# Patient Record
Sex: FEMALE | Race: WHITE | NOT HISPANIC OR LATINO | Employment: OTHER | URBAN - METROPOLITAN AREA
[De-identification: names, ages, dates, MRNs, and addresses within clinical notes are randomized per-mention and may not be internally consistent; named-entity substitution may affect disease eponyms.]

---

## 2023-03-17 LAB
ANION GAP IN SER/PLAS: 12 MMOL/L (ref 10–20)
BASOPHILS (10*3/UL) IN BLOOD BY AUTOMATED COUNT: 0.03 X10E9/L (ref 0–0.1)
BASOPHILS/100 LEUKOCYTES IN BLOOD BY AUTOMATED COUNT: 0.4 % (ref 0–2)
CALCIUM (MG/DL) IN SER/PLAS: 8.6 MG/DL (ref 8.6–10.3)
CARBON DIOXIDE, TOTAL (MMOL/L) IN SER/PLAS: 27 MMOL/L (ref 21–32)
CHLORIDE (MMOL/L) IN SER/PLAS: 103 MMOL/L (ref 98–107)
CREATININE (MG/DL) IN SER/PLAS: 0.64 MG/DL (ref 0.5–1.05)
EOSINOPHILS (10*3/UL) IN BLOOD BY AUTOMATED COUNT: 0.17 X10E9/L (ref 0–0.4)
EOSINOPHILS/100 LEUKOCYTES IN BLOOD BY AUTOMATED COUNT: 2.3 % (ref 0–6)
ERYTHROCYTE DISTRIBUTION WIDTH (RATIO) BY AUTOMATED COUNT: 12 % (ref 11.5–14.5)
ERYTHROCYTE MEAN CORPUSCULAR HEMOGLOBIN CONCENTRATION (G/DL) BY AUTOMATED: 32.9 G/DL (ref 32–36)
ERYTHROCYTE MEAN CORPUSCULAR VOLUME (FL) BY AUTOMATED COUNT: 100 FL (ref 80–100)
ERYTHROCYTES (10*6/UL) IN BLOOD BY AUTOMATED COUNT: 4.13 X10E12/L (ref 4–5.2)
GFR FEMALE: >90 ML/MIN/1.73M2
GLUCOSE (MG/DL) IN SER/PLAS: 109 MG/DL (ref 74–99)
HEMATOCRIT (%) IN BLOOD BY AUTOMATED COUNT: 41.4 % (ref 36–46)
HEMOGLOBIN (G/DL) IN BLOOD: 13.6 G/DL (ref 12–16)
IMMATURE GRANULOCYTES/100 LEUKOCYTES IN BLOOD BY AUTOMATED COUNT: 0.4 % (ref 0–0.9)
LEUKOCYTES (10*3/UL) IN BLOOD BY AUTOMATED COUNT: 7.5 X10E9/L (ref 4.4–11.3)
LYMPHOCYTES (10*3/UL) IN BLOOD BY AUTOMATED COUNT: 1.89 X10E9/L (ref 0.8–3)
LYMPHOCYTES/100 LEUKOCYTES IN BLOOD BY AUTOMATED COUNT: 25.4 % (ref 13–44)
MONOCYTES (10*3/UL) IN BLOOD BY AUTOMATED COUNT: 0.61 X10E9/L (ref 0.05–0.8)
MONOCYTES/100 LEUKOCYTES IN BLOOD BY AUTOMATED COUNT: 8.2 % (ref 2–10)
NEUTROPHILS (10*3/UL) IN BLOOD BY AUTOMATED COUNT: 4.72 X10E9/L (ref 1.6–5.5)
NEUTROPHILS/100 LEUKOCYTES IN BLOOD BY AUTOMATED COUNT: 63.3 % (ref 40–80)
PLATELETS (10*3/UL) IN BLOOD AUTOMATED COUNT: 308 X10E9/L (ref 150–450)
POTASSIUM (MMOL/L) IN SER/PLAS: 3.9 MMOL/L (ref 3.5–5.3)
SODIUM (MMOL/L) IN SER/PLAS: 138 MMOL/L (ref 136–145)
UREA NITROGEN (MG/DL) IN SER/PLAS: 18 MG/DL (ref 6–23)

## 2023-03-19 LAB — STAPH/MRSA SCREEN, CULTURE: NORMAL

## 2023-03-30 LAB — SARS-COV-2 RESULT: NOT DETECTED

## 2023-03-31 ENCOUNTER — HOSPITAL ENCOUNTER (OUTPATIENT)
Dept: DATA CONVERSION | Facility: HOSPITAL | Age: 78
End: 2023-04-01
Attending: STUDENT IN AN ORGANIZED HEALTH CARE EDUCATION/TRAINING PROGRAM | Admitting: STUDENT IN AN ORGANIZED HEALTH CARE EDUCATION/TRAINING PROGRAM
Payer: MEDICARE

## 2023-03-31 DIAGNOSIS — E78.5 HYPERLIPIDEMIA, UNSPECIFIED: ICD-10-CM

## 2023-03-31 DIAGNOSIS — E03.9 HYPOTHYROIDISM, UNSPECIFIED: ICD-10-CM

## 2023-03-31 DIAGNOSIS — I10 ESSENTIAL (PRIMARY) HYPERTENSION: ICD-10-CM

## 2023-03-31 DIAGNOSIS — I25.10 ATHEROSCLEROTIC HEART DISEASE OF NATIVE CORONARY ARTERY WITHOUT ANGINA PECTORIS: ICD-10-CM

## 2023-03-31 DIAGNOSIS — M25.552 PAIN IN LEFT HIP: ICD-10-CM

## 2023-03-31 DIAGNOSIS — Z79.82 LONG TERM (CURRENT) USE OF ASPIRIN: ICD-10-CM

## 2023-03-31 DIAGNOSIS — K21.9 GASTRO-ESOPHAGEAL REFLUX DISEASE WITHOUT ESOPHAGITIS: ICD-10-CM

## 2023-03-31 DIAGNOSIS — M16.12 UNILATERAL PRIMARY OSTEOARTHRITIS, LEFT HIP: ICD-10-CM

## 2023-03-31 DIAGNOSIS — Z95.5 PRESENCE OF CORONARY ANGIOPLASTY IMPLANT AND GRAFT: ICD-10-CM

## 2023-04-01 LAB
ANION GAP IN SER/PLAS: 15 MMOL/L (ref 10–20)
BASOPHILS (10*3/UL) IN BLOOD BY AUTOMATED COUNT: 0.02 X10E9/L (ref 0–0.1)
BASOPHILS/100 LEUKOCYTES IN BLOOD BY AUTOMATED COUNT: 0.2 % (ref 0–2)
CALCIUM (MG/DL) IN SER/PLAS: 8.3 MG/DL (ref 8.6–10.3)
CARBON DIOXIDE, TOTAL (MMOL/L) IN SER/PLAS: 25 MMOL/L (ref 21–32)
CHLORIDE (MMOL/L) IN SER/PLAS: 104 MMOL/L (ref 98–107)
CREATININE (MG/DL) IN SER/PLAS: 0.83 MG/DL (ref 0.5–1.05)
ERYTHROCYTE DISTRIBUTION WIDTH (RATIO) BY AUTOMATED COUNT: 11.9 % (ref 11.5–14.5)
ERYTHROCYTE MEAN CORPUSCULAR HEMOGLOBIN CONCENTRATION (G/DL) BY AUTOMATED: 33.4 G/DL (ref 32–36)
ERYTHROCYTE MEAN CORPUSCULAR VOLUME (FL) BY AUTOMATED COUNT: 98 FL (ref 80–100)
ERYTHROCYTES (10*6/UL) IN BLOOD BY AUTOMATED COUNT: 3.45 X10E12/L (ref 4–5.2)
GFR FEMALE: 72 ML/MIN/1.73M2
GLUCOSE (MG/DL) IN SER/PLAS: 159 MG/DL (ref 74–99)
HEMATOCRIT (%) IN BLOOD BY AUTOMATED COUNT: 33.8 % (ref 36–46)
HEMOGLOBIN (G/DL) IN BLOOD: 11.3 G/DL (ref 12–16)
IMMATURE GRANULOCYTES/100 LEUKOCYTES IN BLOOD BY AUTOMATED COUNT: 2.2 % (ref 0–0.9)
LEUKOCYTES (10*3/UL) IN BLOOD BY AUTOMATED COUNT: 13 X10E9/L (ref 4.4–11.3)
LYMPHOCYTES (10*3/UL) IN BLOOD BY AUTOMATED COUNT: 1.08 X10E9/L (ref 0.8–3)
LYMPHOCYTES/100 LEUKOCYTES IN BLOOD BY AUTOMATED COUNT: 8.3 % (ref 13–44)
MONOCYTES (10*3/UL) IN BLOOD BY AUTOMATED COUNT: 0.81 X10E9/L (ref 0.05–0.8)
MONOCYTES/100 LEUKOCYTES IN BLOOD BY AUTOMATED COUNT: 6.3 % (ref 2–10)
NEUTROPHILS (10*3/UL) IN BLOOD BY AUTOMATED COUNT: 10.75 X10E9/L (ref 1.6–5.5)
NEUTROPHILS/100 LEUKOCYTES IN BLOOD BY AUTOMATED COUNT: 83 % (ref 40–80)
PLATELETS (10*3/UL) IN BLOOD AUTOMATED COUNT: 287 X10E9/L (ref 150–450)
POTASSIUM (MMOL/L) IN SER/PLAS: 4.7 MMOL/L (ref 3.5–5.3)
SODIUM (MMOL/L) IN SER/PLAS: 139 MMOL/L (ref 136–145)
UREA NITROGEN (MG/DL) IN SER/PLAS: 23 MG/DL (ref 6–23)

## 2023-04-02 LAB
ANION GAP IN SER/PLAS: NORMAL
BASOPHILS (10*3/UL) IN BLOOD BY AUTOMATED COUNT: NORMAL
BASOPHILS/100 LEUKOCYTES IN BLOOD BY AUTOMATED COUNT: NORMAL
CALCIUM (MG/DL) IN SER/PLAS: NORMAL
CARBON DIOXIDE, TOTAL (MMOL/L) IN SER/PLAS: NORMAL
CHLORIDE (MMOL/L) IN SER/PLAS: NORMAL
CREATININE (MG/DL) IN SER/PLAS: NORMAL
EOSINOPHILS (10*3/UL) IN BLOOD BY AUTOMATED COUNT: NORMAL
EOSINOPHILS/100 LEUKOCYTES IN BLOOD BY AUTOMATED COUNT: NORMAL
ERYTHROCYTE DISTRIBUTION WIDTH (RATIO) BY AUTOMATED COUNT: NORMAL
ERYTHROCYTE MEAN CORPUSCULAR HEMOGLOBIN CONCENTRATION (G/DL) BY AUTOMATED: NORMAL
ERYTHROCYTE MEAN CORPUSCULAR VOLUME (FL) BY AUTOMATED COUNT: NORMAL
ERYTHROCYTES (10*6/UL) IN BLOOD BY AUTOMATED COUNT: NORMAL
GFR FEMALE: NORMAL
GFR MALE: NORMAL
GLUCOSE (MG/DL) IN SER/PLAS: NORMAL
HEMATOCRIT (%) IN BLOOD BY AUTOMATED COUNT: NORMAL
HEMOGLOBIN (G/DL) IN BLOOD: NORMAL
IMMATURE GRANULOCYTES/100 LEUKOCYTES IN BLOOD BY AUTOMATED COUNT: NORMAL
LEUKOCYTES (10*3/UL) IN BLOOD BY AUTOMATED COUNT: NORMAL
LYMPHOCYTES (10*3/UL) IN BLOOD BY AUTOMATED COUNT: NORMAL
LYMPHOCYTES/100 LEUKOCYTES IN BLOOD BY AUTOMATED COUNT: NORMAL
MANUAL DIFFERENTIAL Y/N: NORMAL
MONOCYTES (10*3/UL) IN BLOOD BY AUTOMATED COUNT: NORMAL
MONOCYTES/100 LEUKOCYTES IN BLOOD BY AUTOMATED COUNT: NORMAL
NEUTROPHILS (10*3/UL) IN BLOOD BY AUTOMATED COUNT: NORMAL
NEUTROPHILS/100 LEUKOCYTES IN BLOOD BY AUTOMATED COUNT: NORMAL
NRBC (PER 100 WBCS) BY AUTOMATED COUNT: NORMAL
PLATELETS (10*3/UL) IN BLOOD AUTOMATED COUNT: NORMAL
POTASSIUM (MMOL/L) IN SER/PLAS: NORMAL
SODIUM (MMOL/L) IN SER/PLAS: NORMAL
UREA NITROGEN (MG/DL) IN SER/PLAS: NORMAL

## 2023-05-11 ENCOUNTER — OFFICE VISIT (OUTPATIENT)
Dept: PRIMARY CARE | Facility: CLINIC | Age: 78
End: 2023-05-11
Payer: MEDICARE

## 2023-05-11 VITALS
BODY MASS INDEX: 36.79 KG/M2 | DIASTOLIC BLOOD PRESSURE: 70 MMHG | SYSTOLIC BLOOD PRESSURE: 120 MMHG | HEIGHT: 63 IN | HEART RATE: 58 BPM | OXYGEN SATURATION: 98 % | WEIGHT: 207.6 LBS

## 2023-05-11 DIAGNOSIS — E03.9 HYPOTHYROIDISM, UNSPECIFIED TYPE: ICD-10-CM

## 2023-05-11 DIAGNOSIS — Z00.00 ROUTINE GENERAL MEDICAL EXAMINATION AT HEALTH CARE FACILITY: ICD-10-CM

## 2023-05-11 DIAGNOSIS — I10 ESSENTIAL HYPERTENSION: ICD-10-CM

## 2023-05-11 DIAGNOSIS — F32.A ANXIETY AND DEPRESSION: ICD-10-CM

## 2023-05-11 DIAGNOSIS — Z00.00 MEDICARE ANNUAL WELLNESS VISIT, SUBSEQUENT: Primary | ICD-10-CM

## 2023-05-11 DIAGNOSIS — Z13.89 ENCOUNTER FOR SCREENING FOR OTHER DISORDER: ICD-10-CM

## 2023-05-11 DIAGNOSIS — R32 URINARY INCONTINENCE, UNSPECIFIED TYPE: ICD-10-CM

## 2023-05-11 DIAGNOSIS — E66.01 CLASS 2 SEVERE OBESITY DUE TO EXCESS CALORIES WITH SERIOUS COMORBIDITY AND BODY MASS INDEX (BMI) OF 36.0 TO 36.9 IN ADULT (MULTI): ICD-10-CM

## 2023-05-11 DIAGNOSIS — F41.9 ANXIETY AND DEPRESSION: ICD-10-CM

## 2023-05-11 PROBLEM — M94.9 DISORDER OF BONE AND CARTILAGE: Status: ACTIVE | Noted: 2023-05-11

## 2023-05-11 PROBLEM — G47.00 INSOMNIA: Status: ACTIVE | Noted: 2023-05-11

## 2023-05-11 PROBLEM — J30.9 ALLERGIC RHINITIS: Status: ACTIVE | Noted: 2023-05-11

## 2023-05-11 PROBLEM — J98.01 BRONCHOSPASM, ACUTE: Status: ACTIVE | Noted: 2023-05-11

## 2023-05-11 PROBLEM — M54.50 LOW BACK PAIN: Status: ACTIVE | Noted: 2023-05-11

## 2023-05-11 PROBLEM — G56.00 CARPAL TUNNEL SYNDROME: Status: ACTIVE | Noted: 2023-05-11

## 2023-05-11 PROBLEM — H90.3 BILATERAL SENSORINEURAL HEARING LOSS: Status: ACTIVE | Noted: 2023-05-11

## 2023-05-11 PROBLEM — R29.6 FREQUENT FALLS: Status: ACTIVE | Noted: 2023-05-11

## 2023-05-11 PROBLEM — R06.83 SNORING: Status: ACTIVE | Noted: 2023-05-11

## 2023-05-11 PROBLEM — R68.89 FORGETFULNESS: Status: ACTIVE | Noted: 2023-05-11

## 2023-05-11 PROBLEM — G47.9 SLEEP DISORDER: Status: ACTIVE | Noted: 2023-05-11

## 2023-05-11 PROBLEM — R79.9 ABNORMAL BLOOD CHEMISTRY: Status: ACTIVE | Noted: 2023-05-11

## 2023-05-11 PROBLEM — M17.0 PRIMARY LOCALIZED OSTEOARTHRITIS OF BOTH KNEES: Status: ACTIVE | Noted: 2023-05-11

## 2023-05-11 PROBLEM — M25.569 JOINT PAIN, KNEE: Status: ACTIVE | Noted: 2023-05-11

## 2023-05-11 PROBLEM — M16.12 PRIMARY LOCALIZED OSTEOARTHROSIS OF LEFT HIP: Status: ACTIVE | Noted: 2023-05-11

## 2023-05-11 PROBLEM — N95.1 POSTMENOPAUSAL DISORDER: Status: ACTIVE | Noted: 2023-05-11

## 2023-05-11 PROBLEM — R53.83 FATIGUE: Status: ACTIVE | Noted: 2023-05-11

## 2023-05-11 PROBLEM — I25.10 CAD (CORONARY ARTERY DISEASE): Status: ACTIVE | Noted: 2023-05-11

## 2023-05-11 PROBLEM — M48.10 DISH (DISSEMINATED IDIOPATHIC SKELETAL HYPEROSTOSIS): Status: ACTIVE | Noted: 2023-05-11

## 2023-05-11 PROBLEM — M47.816 DJD (DEGENERATIVE JOINT DISEASE), LUMBAR: Status: ACTIVE | Noted: 2023-05-11

## 2023-05-11 PROBLEM — R10.32 LEFT GROIN PAIN: Status: ACTIVE | Noted: 2023-05-11

## 2023-05-11 PROBLEM — R31.29 MICROSCOPIC HEMATURIA: Status: ACTIVE | Noted: 2023-05-11

## 2023-05-11 PROBLEM — M89.9 DISORDER OF BONE AND CARTILAGE: Status: ACTIVE | Noted: 2023-05-11

## 2023-05-11 PROBLEM — M54.2 CERVICALGIA: Status: ACTIVE | Noted: 2023-05-11

## 2023-05-11 PROBLEM — M71.9 DISORDER OF BURSAE AND TENDONS IN SHOULDER REGION: Status: ACTIVE | Noted: 2023-05-11

## 2023-05-11 PROBLEM — M25.551 RIGHT HIP PAIN: Status: ACTIVE | Noted: 2023-05-11

## 2023-05-11 PROBLEM — M15.9 OSTEOARTHRITIS OF MULTIPLE JOINTS: Status: ACTIVE | Noted: 2023-05-11

## 2023-05-11 PROBLEM — K64.9 HEMORRHOIDS: Status: ACTIVE | Noted: 2023-05-11

## 2023-05-11 PROBLEM — R35.0 URINARY FREQUENCY: Status: ACTIVE | Noted: 2023-05-11

## 2023-05-11 PROBLEM — K21.9 ACID REFLUX: Status: ACTIVE | Noted: 2023-05-11

## 2023-05-11 PROBLEM — M67.919 DISORDER OF BURSAE AND TENDONS IN SHOULDER REGION: Status: ACTIVE | Noted: 2023-05-11

## 2023-05-11 PROCEDURE — 99397 PER PM REEVAL EST PAT 65+ YR: CPT | Performed by: PHYSICIAN ASSISTANT

## 2023-05-11 PROCEDURE — 3078F DIAST BP <80 MM HG: CPT | Performed by: PHYSICIAN ASSISTANT

## 2023-05-11 PROCEDURE — G0444 DEPRESSION SCREEN ANNUAL: HCPCS | Performed by: PHYSICIAN ASSISTANT

## 2023-05-11 PROCEDURE — 1159F MED LIST DOCD IN RCRD: CPT | Performed by: PHYSICIAN ASSISTANT

## 2023-05-11 PROCEDURE — 1160F RVW MEDS BY RX/DR IN RCRD: CPT | Performed by: PHYSICIAN ASSISTANT

## 2023-05-11 PROCEDURE — G0439 PPPS, SUBSEQ VISIT: HCPCS | Performed by: PHYSICIAN ASSISTANT

## 2023-05-11 PROCEDURE — 1123F ACP DISCUSS/DSCN MKR DOCD: CPT | Performed by: PHYSICIAN ASSISTANT

## 2023-05-11 PROCEDURE — 1036F TOBACCO NON-USER: CPT | Performed by: PHYSICIAN ASSISTANT

## 2023-05-11 PROCEDURE — 1033F TOBACCO NONSMOKER NOR 2NDHND: CPT | Performed by: PHYSICIAN ASSISTANT

## 2023-05-11 PROCEDURE — 3074F SYST BP LT 130 MM HG: CPT | Performed by: PHYSICIAN ASSISTANT

## 2023-05-11 PROCEDURE — 1170F FXNL STATUS ASSESSED: CPT | Performed by: PHYSICIAN ASSISTANT

## 2023-05-11 RX ORDER — LOSARTAN POTASSIUM AND HYDROCHLOROTHIAZIDE 25; 100 MG/1; MG/1
1 TABLET ORAL EVERY MORNING
COMMUNITY
Start: 2023-03-28 | End: 2024-04-16 | Stop reason: ENTERED-IN-ERROR

## 2023-05-11 RX ORDER — PAROXETINE HYDROCHLORIDE 20 MG/1
1 TABLET, FILM COATED ORAL DAILY
COMMUNITY
Start: 2013-04-22 | End: 2023-06-06

## 2023-05-11 RX ORDER — CHOLECALCIFEROL (VITAMIN D3) 25 MCG
1 TABLET ORAL EVERY MORNING
COMMUNITY
End: 2024-05-29 | Stop reason: WASHOUT

## 2023-05-11 RX ORDER — LEVOTHYROXINE SODIUM 88 UG/1
1 TABLET ORAL DAILY
COMMUNITY
Start: 2013-04-22 | End: 2023-06-06

## 2023-05-11 RX ORDER — METOPROLOL SUCCINATE 100 MG/1
1 TABLET, EXTENDED RELEASE ORAL EVERY MORNING
COMMUNITY
Start: 2016-11-30 | End: 2024-04-10 | Stop reason: HOSPADM

## 2023-05-11 RX ORDER — ATORVASTATIN CALCIUM 80 MG/1
1 TABLET, FILM COATED ORAL NIGHTLY
Status: ON HOLD | COMMUNITY
Start: 2016-01-07 | End: 2024-04-08

## 2023-05-11 ASSESSMENT — ACTIVITIES OF DAILY LIVING (ADL)
MANAGING_FINANCES: INDEPENDENT
DOING_HOUSEWORK: INDEPENDENT
TAKING_MEDICATION: INDEPENDENT
DRESSING: INDEPENDENT
GROCERY_SHOPPING: INDEPENDENT
BATHING: INDEPENDENT

## 2023-05-11 ASSESSMENT — ENCOUNTER SYMPTOMS
OCCASIONAL FEELINGS OF UNSTEADINESS: 1
LOSS OF SENSATION IN FEET: 0
DEPRESSION: 0

## 2023-05-11 ASSESSMENT — PATIENT HEALTH QUESTIONNAIRE - PHQ9
SUM OF ALL RESPONSES TO PHQ9 QUESTIONS 1 AND 2: 0
2. FEELING DOWN, DEPRESSED OR HOPELESS: NOT AT ALL
1. LITTLE INTEREST OR PLEASURE IN DOING THINGS: NOT AT ALL

## 2023-05-11 NOTE — PROGRESS NOTES
Subjective   Reason for Visit: Marybeth Duggan is an 77 y.o. female here for a Medicare Wellness visit.     Past Medical, Surgical, and Family History reviewed and updated in chart.         HPI 77-year-old female presenting with her daughter for this visit.  Generally doing well. Currently c/o:     Urinary incontinence: chronic, worsening. Wearing depends usually cannot make it to the restroom in time. Previously followed with Urology, Dr. Rodriguez. Last seen 6/14/2022. Per Dr. Rodriguez's note, she was prescribed Vesicare and Gemtesa, but patient and daughter do not recall having ever tried the medications. She would like to meet with Urology again.  Referral will be placed    HTN, HLD, CAD: Compliant with losartan-HCTZ 100-25 mg, metoprolol succinate 100 mg. Stable on 81 mg ASA and atorvastatin 80 mg. She does check BP at home, is controlled. She does endorse some dizziness when walking. Denies any headache, chest pain, SOB/WARNER, palpitations, LE edema.  Follows with cardiology, last seen 3/28/2023.    Anxiety and depression: Feeling well on paroxetine 20 mg daily.     S/p left DOMINGUEZ (with Dr. Guzman): Tolerated the procedure well and has completed home PT.  Unfortunately she continues to limp and experience pain in the anterior thigh/groin. She is not taking any narcotic pain medicine, but does use Tylenol for pain.  She ambulates with a cane.    Health maintenance:  Immunizations:  -Flu: deferred to fall 2023  -Pneumococcal: UTD  -Shingrix: recommended, obtain from local pharmacy  -Tdap: Recommended, obtain from pharmacy  Mammogram UTD (last 2/22/22) - declined further testing  Colonoscopy UTD (last 2/11/22) - no future screening d/t age  DEXA UTD (last 5/11/18) - normal bone density, declined further screening  Has healthcare POA/living well    Last MCR / CPE: 5/11/2023 (MCR ADV)    Patient Care Team:  Yoana Willoughby PA-C as PCP - General  Yoana Willoughby PA-C as PCP - [employer] PCP     Objective  "  Vitals:  /70   Pulse 58   Ht 1.6 m (5' 3\")   Wt 94.2 kg (207 lb 9.6 oz)   SpO2 98%   BMI 36.77 kg/m²       Physical Exam  Vitals reviewed.   Constitutional:       General: She is not in acute distress.     Appearance: Normal appearance.   HENT:      Head: Normocephalic and atraumatic.      Right Ear: Tympanic membrane, ear canal and external ear normal. There is no impacted cerumen.      Left Ear: Tympanic membrane, ear canal and external ear normal. There is no impacted cerumen.      Nose: Nose normal. No congestion or rhinorrhea.      Mouth/Throat:      Mouth: Mucous membranes are moist.      Pharynx: Oropharynx is clear. No oropharyngeal exudate or posterior oropharyngeal erythema.   Eyes:      General: No scleral icterus.        Right eye: No discharge.         Left eye: No discharge.      Extraocular Movements: Extraocular movements intact.      Conjunctiva/sclera: Conjunctivae normal.      Pupils: Pupils are equal, round, and reactive to light.   Cardiovascular:      Rate and Rhythm: Normal rate and regular rhythm.      Heart sounds: Normal heart sounds. No murmur heard.     No friction rub. No gallop.   Pulmonary:      Effort: Pulmonary effort is normal. No respiratory distress.      Breath sounds: Normal breath sounds. No stridor. No wheezing, rhonchi or rales.   Abdominal:      General: Bowel sounds are normal. There is no distension.      Palpations: Abdomen is soft. There is no mass.      Tenderness: There is no abdominal tenderness. There is no right CVA tenderness or left CVA tenderness.   Musculoskeletal:         General: Normal range of motion.      Cervical back: Normal range of motion and neck supple.      Right lower leg: No edema.      Left lower leg: No edema.      Comments: Ambulates with a cane   Skin:     General: Skin is warm and dry.      Findings: No rash.   Neurological:      General: No focal deficit present.      Mental Status: She is alert and oriented to person, place, and " time. Mental status is at baseline.      Cranial Nerves: No cranial nerve deficit.      Gait: Gait normal.   Psychiatric:         Mood and Affect: Mood normal.         Behavior: Behavior normal.         Assessment/Plan   Problem List Items Addressed This Visit       Anxiety and depression    Current Assessment & Plan     Stable.  Continue paroxetine 20 mg daily         Essential hypertension    Current Assessment & Plan     Well-controlled.  Continue losartan-HCTZ 100-25 mg and metoprolol succinate 100 mg.  Continue monitoring BP at home regularly.  Follow with cardiology.         Hypothyroidism    Current Assessment & Plan     Continue levothyroxine 88 mcg daily.         Urinary incontinence    Current Assessment & Plan     Referral to urology placed.  Continue wearing depends.  Encourage proper hygiene and washing often to prevent sores         Relevant Orders    Referral to Urology    Class 2 severe obesity due to excess calories with serious comorbidity and body mass index (BMI) of 36.0 to 36.9 in adult (CMS/[facility])    Current Assessment & Plan     Follow-up low carbohydrate and saturated fat diet.  Exercise for 30 minutes daily as tolerated         Medicare annual wellness visit, subsequent - Primary     Other Visit Diagnoses       Routine general medical examination at health care facility        Encounter for screening for other disorder                 Follow-up in 6 months or sooner as needed

## 2023-05-12 PROBLEM — Z00.00 MEDICARE ANNUAL WELLNESS VISIT, SUBSEQUENT: Status: ACTIVE | Noted: 2023-05-12

## 2023-05-12 PROBLEM — E66.812 CLASS 2 SEVERE OBESITY DUE TO EXCESS CALORIES WITH SERIOUS COMORBIDITY AND BODY MASS INDEX (BMI) OF 36.0 TO 36.9 IN ADULT: Status: ACTIVE | Noted: 2023-05-12

## 2023-05-12 PROBLEM — E66.01 CLASS 2 SEVERE OBESITY DUE TO EXCESS CALORIES WITH SERIOUS COMORBIDITY AND BODY MASS INDEX (BMI) OF 36.0 TO 36.9 IN ADULT (MULTI): Status: ACTIVE | Noted: 2023-05-12

## 2023-05-12 PROBLEM — R32 URINARY INCONTINENCE: Status: ACTIVE | Noted: 2023-05-12

## 2023-05-12 NOTE — ASSESSMENT & PLAN NOTE
Referral to urology placed.  Continue wearing depends.  Encourage proper hygiene and washing often to prevent sores

## 2023-05-12 NOTE — ASSESSMENT & PLAN NOTE
Well-controlled.  Continue losartan-HCTZ 100-25 mg and metoprolol succinate 100 mg.  Continue monitoring BP at home regularly.  Follow with cardiology.

## 2023-05-12 NOTE — ASSESSMENT & PLAN NOTE
Continue 80 mg aspirin and atorvastatin 80 mg.  Follow Mediterranean-style diet and exercise as tolerated.  Follow with cardiology

## 2023-06-06 DIAGNOSIS — F32.A ANXIETY AND DEPRESSION: Primary | ICD-10-CM

## 2023-06-06 DIAGNOSIS — F41.9 ANXIETY AND DEPRESSION: Primary | ICD-10-CM

## 2023-06-06 DIAGNOSIS — E03.9 HYPOTHYROIDISM, UNSPECIFIED TYPE: ICD-10-CM

## 2023-06-06 RX ORDER — LEVOTHYROXINE SODIUM 88 UG/1
TABLET ORAL
Qty: 90 TABLET | Refills: 1 | Status: SHIPPED | OUTPATIENT
Start: 2023-06-06 | End: 2023-09-06

## 2023-06-06 RX ORDER — PAROXETINE HYDROCHLORIDE 20 MG/1
TABLET, FILM COATED ORAL
Qty: 90 TABLET | Refills: 1 | Status: SHIPPED | OUTPATIENT
Start: 2023-06-06 | End: 2023-11-09 | Stop reason: SDUPTHER

## 2023-09-05 DIAGNOSIS — E03.9 HYPOTHYROIDISM, UNSPECIFIED TYPE: ICD-10-CM

## 2023-09-06 RX ORDER — LEVOTHYROXINE SODIUM 88 UG/1
TABLET ORAL
Qty: 100 TABLET | Refills: 0 | Status: SHIPPED | OUTPATIENT
Start: 2023-09-06 | End: 2023-11-09 | Stop reason: SDUPTHER

## 2023-09-07 VITALS
WEIGHT: 207.89 LBS | HEART RATE: 80 BPM | BODY MASS INDEX: 36.84 KG/M2 | SYSTOLIC BLOOD PRESSURE: 110 MMHG | OXYGEN SATURATION: 97 % | DIASTOLIC BLOOD PRESSURE: 63 MMHG | HEIGHT: 63 IN

## 2023-09-14 NOTE — PROGRESS NOTES
Service: Orthopaedics     Subjective Data:   JAYA DAWN is a 77 year old Female who is Hospital Day # 2 and POD #1 for Left total hip arthroplasty.     Reports pain adequately controlled  Denies any chest pain, SOB, nausea, vomiting.   Tolerating Diet.   Mild surgical swelling, no active bleeding or s/s of infection.    Denies calf tenderness b/l.    Overnight Events: Patient had an uneventful night.     Objective Data:     Objective Information:      T   P  R  BP   MAP  SpO2   Value  36.3  69  18  109/27      92%  Date/Time 4/1 8:00 4/1 8:00 4/1 8:00 4/1 8:00    4/1 8:00  Range  (36.3C - 36.6C )  (60 - 72 )  (16 - 18 )  (109 - 150 )/ (27 - 81 )    (92% - 96% )      Pain reported at 4/1 12:44: 0 = None    Physical Exam by System:    Constitutional: Well developed, awake/alert/oriented  x3, no distress, alert and cooperative    Sitting up in bed, chatty, NAD   Eyes: PERRLA   ENMT: mucous membranes moist, no apparent injury,  no lesions seen   Head/Neck: Neck supple, no apparent injury, trachea  midline   Respiratory/Thorax: Patent airways, CTAB, normal  breath sounds with good chest expansion, thorax symmetric   Cardiovascular: Regular, rate and rhythm, 2+ equal  pulses of the extremities   Gastrointestinal: Nondistended, soft, non-tender,  +BS   Genitourinary: voiding without difficulty   Musculoskeletal: ROM intact, no joint swelling, normal  strength    generalized weakness 2/2 joint replacement   Extremities: no cyanosis edema, contusions or wounds,  no clubbing    LLE, neurovascular intact, <2 cap refill, df/pf, dp/pt, 2+ pulses, no drainage noted. Hip mepilex c/d/i, ice pack applied   Neurological: alert and oriented x3, intact senses   Psychological: Appropriate mood and behavior   Skin: Warm and dry, no lesions, no rashes     Medication:    Medications:          Continuous Medications       --------------------------------    1. Lactated Ringers Infusion:  1000  mL  IntraVenous  <Continuous>          Scheduled Medications       --------------------------------    1. Acetaminophen:  975  mg  Oral  Every 8 Hours    2. Apixaban:  2.5  mg  Oral  Every 12 Hours    3. Atorvastatin:  80  mg  Oral  Daily    4. Docusate:  100  mg  Oral  2 Times a Day    5. Ketorolac Injectable:  15  mg  IntraVenous Push  Every 6 Hours    6. Levothyroxine:  88  microgram(s)  Oral  Daily    7. Metoprolol Succinate Extended Release:  100  mg  Oral  Daily    8. Pantoprazole:  40  mg  Oral  <User Schedule>    9. PARoxetine:  20  mg  Oral  Daily    10. Sennosides:  1  tablet(s)  Oral  Daily         PRN Medications       --------------------------------    1. diphenhydrAMINE:  25  mg  Oral  Every 6 Hours    2. HYDROmorphone Injectable:  0.4  mg  IntraVenous Push  Every 4 Hours    3. Ondansetron Injectable:  4  mg  IntraVenous Push  Every 6 Hours    4. oxyCODONE Immediate Release:  5  mg  Oral  Every 4 Hours    5. oxyCODONE Immediate Release:  10  mg  Oral  Every 4 Hours    6. Polyethylene Glycol:  17  gram(s)  Oral  2 Times a Day    7. Sore Throat Lozenge:  1  lozenge(s)  Oral  Every 4 Hours    8. traMADol:  50  mg  Oral  Every 6 Hours        Recent Lab Results:    Results:        I have reviewed these laboratory results:    Complete Blood Count + Differential  01-Apr-2023 05:19:00      Result Value    White Blood Cell Count  13.0   H   Red Blood Cell Count  3.45   L   HGB  11.3   L   HCT  33.8   L   MCV  98    MCHC  33.4    PLT  287    RDW-CV  11.9    Neutrophil %  83.0    Immature Granulocytes %  2.2   H   Lymphocyte %  8.3    Monocyte %  6.3    Basophil %  0.2    Neutrophil Count  10.75   H   Lymphocyte Count  1.08    Monocyte Count  0.81   H   Basophil Count  0.02      Basic Metabolic Panel  01-Apr-2023 05:19:00      Result Value    Glucose, Serum  159   H   NA  139    K  4.7    CL  104    Bicarbonate, Serum  25    Anion Gap, Serum  15    BUN  23    CREAT  0.83    GFR Female  72    Calcium, Serum  8.3   L       Radiology  Results:    Results:        Impression:    Satisfactory appearance status post left total hip arthroplasty.     Xray Pelvis 1 or 2 View [Apr 1 2023  8:44AM]      Impression:    Satisfactory appearance status post left total hip arthroplasty.     Xray Pelvis 1 or 2 View [Apr 1 2023  8:44AM]      Assessment and Plan:   Code Status:  ·  Code Status Full Code     Assessment:    JAYA DAWN is a 77 year old Female who is Hospital Day # 2 and POD #1 for Left total hip arthroplasty    Doing well overall, pain is controlled.     Ortho/ Musculoskeletal: Osteoarthritis s/p right total hip replacement. Denies numbness, able to wiggle toes, neurovascular intact   -WBAT with FWW   -OOBT/ PT/ Mobilize   -Ice to affected area   -Keep mepilex in place x1 week postoperative   - continue paroxetine     Neuro: Acute postop pain as expected - well controlled on current medication regimen   Hx: Anxiety, depression, insomnia   -Continue current pain regimen       CV: RRR  Hx: CAD, HTN, HLD  -Continue to monitor vital signs   - continue metoprolol and atorvastatin     Resp: On RA, NAD, lungs CTAB   -IS Q 1 hour while awake       GI: denies, N/V, abdomen soft   Hx: GERD  -Tolerating regular diet   -PRN Antiemetic   -Stool softener/ laxative   - continue PPI    :   voiding without difficulty     Heme: DVT Proph   -SCDs/ TEDs   - Eliquis 2.5mg BID for blood clot prevention     Endo:   Hx: hypothyroidism   - continue levothyroxine     ID: Afebrile   -Monitor for s/s of infection   - cefadroxil     Dispo: Doing well, POD #1, cleared by PT, ok to be discharged home with [facility]     Patient has progressed extremely well and exceeded expectations regarding their recovery and is medically stable, sufficiently mobile and able to be discharged home safely at this time with home care services.     I spent 30 minutes with this patient and/or family.  Greater than 50% of this time was spent in counseling and/or coordination of  care.          Electronic Signatures:  Yesi Ruelas ([facility]-CNP)  (Signed 01-Apr-2023 13:11)   Authored: Service, Subjective Data, Objective Data, Assessment  and Plan, Note Completion      Last Updated: 01-Apr-2023 13:11 by Yesi Ruelas ([facility]-CNP)

## 2023-09-14 NOTE — DISCHARGE SUMMARY
Send Summary:   Discharge Summary Providers:  Provider Role Provider Name   · Attending Malik Guzman   · Referring Malik Guzman   · Primary Head, Yoana BAEZ   · Primary Rose Sandoval       Note Recipients: Yoana Willoughby, PAC - [phone]  []       Discharge:    Summary:   Admission Date: .31-Mar-2023 09:58:00   Discharge Date: 01-Apr-2023   Attending Physician at Discharge: Malik Guzman   Admission Reason: Left hip OA   Final Discharge Diagnoses: Status post total replacement  of left hip   Procedures: Date: 31-Mar-2023 16:12:00  Procedure Name: Left total hip arthroplasty   Condition at Discharge: Fair   Disposition at Discharge: Home Health Care - New   Physical Exam:    see daily note   Hospital Course:    77 year-old F who presented with L hip OA. Patient is now s/p L DOMINGUEZ on 3/31/23 by Dr. Guzman. On the day of surgery, patient was identified in the pre-operative  holding area and agreeable to proceed with surgery. Written consent was obtained.  Please see operative note for further details of this procedure. Patient received 24 hours of aldair-operative antibiotics. Patient recovered in the PACU before transfer  to a regular nursing floor. Patient was started on oxycodone, tylenol, toradol, and dilaudid for pain control and Eliquis 2.5 mg bid for DVT prophylaxis. Physical therapy recommended continued physical therapy and wound care. On the day of discharge,  patient was afebrile with stable vital signs. Patient was neurovascularly intact at time of discharge. Patient was discharged with prescription of Eliquis for DVT prophylaxis for 4 weeks. Patient will follow-up with Dr. Guzman in 2 weeks for postoperative  visit     Immunizations:    Immunizations:  20-Mar-2021   SARS-CoV-2 (COVID-19): Immunizations, 20-Mar-2021  20-Feb-2021   SARS-CoV-2 (COVID-19): Immunizations, 20-Feb-2021      Discharge Information:    and Continuing Care:   Lab Results - Pending:    None  Radiology Results -  Pending: None   Discharge Instructions:    Activity:           activity with assistance.          May not shower.  Keep incision clean and dry for one week          May not drive while taking narcotics.            No pushing, pulling, or lifting objects greater than 10 pounds.            Weight-bearing Instructions: full weight bearing left leg.      Nutrition/Diet:           resume normal diet    Wound Care:           Wound Type:   surgical incision          Instructions:   no lotions, creams, or tub soaks          Other Instructions:   Keep surgical dressing on for 1 week    Additional Orders:           Additional Instructions:   MEDICATION SIDE EFFECTS.  OXYCODONE: constipation, nausea, vomiting, upset stomach, (sleepiness), dizziness, lightheadedness, itching, headache, blurred vision, dry mouth, sweating  TRAMADOL: headache, dizziness, drowsiness, tired feeling; constipation, diarrhea, nausea, vomiting, stomach pain; feeling nervous or anxious, itching, sweating, flushing.    Call if any drainage after 7 days, increased redness/warmth/swelling at incision site, pain/tenderness of calf, swelling of calf that does not respond to elevation, SOB/chest pain.    Do not swim in pools or ponds until 3 months after surgery.  Apply АНДРЕЙ hose or ACE bandages to both lower legs x 6 weeks; remove at night.  Do not visit the dentist until 3 months after the surgery date.  You will need to take an antibiotic previous to any dental procedure and colonoscopy.  Please call [phone] and request a prescription for antibiotics for previous to these procedures..    Home Care Certification:           Home Care Agency:    Home Team [phone]          Skilled Disciplines Ordered:   PT    Home Care Services:           Home Care Skilled Service:   Rehab (PT/OT/SP eval and treat)    Follow Up Appointments:    Follow-Up Appointment 01:           Physician/Dept/Service:   Dr. Guzman          Call to Schedule in:   2 weeks           Phone Number:   [phone]    Discharge Medications: Home Medication   losartan-hydroCHLOROthiazide 100 mg-25 mg oral tablet - 1 tab(s) orally once a day  Eliquis 2.5 mg oral tablet - 1 tab(s) orally 2 times a day -.ADOD - .Meds to Beds  4/1 to help prevent blood clots   acetaminophen 500 mg oral capsule - 2 cap(s) orally every 8 hours -.ADOD 4/1 - .Meds to Beds  for pain   cefadroxil 500 mg oral capsule - 1 cap(s) orally 2 times a day -.ADOD - .Meds to Beds  4/1 to help prevent infection   Colace 100 mg oral capsule - 1 cap(s) orally 2 times a day -.ADOD - .Meds to Beds  4/1 as needed for constipation   omeprazole 20 mg oral delayed release tablet - 1 tab(s) orally once a day -.ADOD - .Meds to Beds  4/1   ondansetron 4 mg oral tablet - 1 tab(s) orally every 8 hours -.Meds to Beds - .ADOD 4/1  for nausea or vomiting.   oxyCODONE 5 mg oral tablet - 1 tab(s) orally every 6 hours -.ADOD - .Meds to Beds  4/1 as needed for moderate to severe pain   Senna 8.6 mg oral tablet - 1 tab(s) orally once a day -.ADOD - .Meds to Beds  4/1 to help with constipation   traMADol 50 mg oral tablet - 1 tab(s) orally every 6 hours -.ADOD - .Meds to Beds  4/1 for breakthrough pain. Do not take with Oxycodone.   metoprolol succinate 100 mg oral tablet, extended release - 1 tab(s) orally once a day  atorvastatin 80 mg oral tablet - 1 tab(s) orally once a day  levothyroxine 88 mcg (0.088 mg) oral tablet - 1 tab(s) orally once a day  PARoxetine 20 mg oral tablet - 1 tab(s) orally once a day     PRN Medication     DNR Status:   ·  Code Status Code Status order at time of discharge: Full Code       Electronic Signatures:  Yesi Ruelas (APRN-CNP)  (Signed 01-Apr-2023 13:14)   Authored: Send Summary, Summary Content, Immunizations,  Ongoing Care, DNR Status, Note Completion      Last Updated: 01-Apr-2023 13:14 by Yesi Ruelas (APRN-CNP)

## 2023-09-14 NOTE — H&P
History & Physical Reviewed:   I have reviewed the History and Physical dated:  17-Mar-2023   History and Physical reviewed and relevant findings noted. Patient examined to review pertinent physical  findings.: Significant findings noted below   Findings: Risks and benefits explained with a Chinese  speaking .  All questions were answered.  Proceed with left total hip arthroplasty   Home Medications Reviewed: no changes noted   Allergies Reviewed: no changes noted       ERAS (Enhanced Recovery After Surgery):  ·  ERAS Patient: no     Consent:   COVID-19 Consent:  ·  COVID-19 Risk Consent Surgeon has reviewed key risks related to the risk of rodrigo COVID-19 and if they contract COVID-19 what the risks are.     Attestation:   Note Completion:  I am a:  Resident/Fellow   Attending Attestation I saw and evaluated the patient.  I personally obtained the key and critical portions of the history and physical exam or was physically present for key and  critical portions performed by the resident/fellow. I reviewed the resident/fellow?s documentation and discussed the patient with the resident/fellow.  I agree with the resident/fellow?s medical decision making as documented in the note.     I personally evaluated the patient on 31-Mar-2023         Electronic Signatures:  Malik Guzman)  (Signed 31-Mar-2023 14:12)   Authored: History & Physical Reviewed, Note Completion   Co-Signer: History & Physical Reviewed, ERAS, Consent, Note Completion  Nikhil Rubin (Resident))  (Signed 31-Mar-2023 06:35)   Authored: History & Physical Reviewed, ERAS, Consent,  Note Completion      Last Updated: 31-Mar-2023 14:12 by Malik Guzman)

## 2023-11-09 ENCOUNTER — LAB (OUTPATIENT)
Dept: LAB | Facility: LAB | Age: 78
End: 2023-11-09
Payer: MEDICARE

## 2023-11-09 ENCOUNTER — OFFICE VISIT (OUTPATIENT)
Dept: PRIMARY CARE | Facility: CLINIC | Age: 78
End: 2023-11-09
Payer: MEDICARE

## 2023-11-09 VITALS
BODY MASS INDEX: 37.55 KG/M2 | SYSTOLIC BLOOD PRESSURE: 143 MMHG | OXYGEN SATURATION: 95 % | DIASTOLIC BLOOD PRESSURE: 84 MMHG | WEIGHT: 212 LBS | HEART RATE: 66 BPM

## 2023-11-09 DIAGNOSIS — F41.9 ANXIETY AND DEPRESSION: ICD-10-CM

## 2023-11-09 DIAGNOSIS — I10 ESSENTIAL HYPERTENSION: Primary | ICD-10-CM

## 2023-11-09 DIAGNOSIS — E03.9 HYPOTHYROIDISM, UNSPECIFIED TYPE: ICD-10-CM

## 2023-11-09 DIAGNOSIS — E55.9 VITAMIN D DEFICIENCY: ICD-10-CM

## 2023-11-09 DIAGNOSIS — R73.9 HYPERGLYCEMIA: ICD-10-CM

## 2023-11-09 DIAGNOSIS — E78.5 HYPERLIPIDEMIA, UNSPECIFIED HYPERLIPIDEMIA TYPE: ICD-10-CM

## 2023-11-09 DIAGNOSIS — I10 ESSENTIAL HYPERTENSION: ICD-10-CM

## 2023-11-09 DIAGNOSIS — F32.A ANXIETY AND DEPRESSION: ICD-10-CM

## 2023-11-09 DIAGNOSIS — R32 URINARY INCONTINENCE, UNSPECIFIED TYPE: ICD-10-CM

## 2023-11-09 PROBLEM — R52 PAIN: Status: ACTIVE | Noted: 2023-11-09

## 2023-11-09 PROBLEM — I82.90 VENOUS THROMBOSIS: Status: ACTIVE | Noted: 2023-11-09

## 2023-11-09 PROBLEM — R60.0 PERIPHERAL EDEMA: Status: ACTIVE | Noted: 2023-11-09

## 2023-11-09 PROBLEM — I95.9 LOW BLOOD PRESSURE: Status: ACTIVE | Noted: 2023-11-09

## 2023-11-09 PROBLEM — L90.0 LICHEN SCLEROSUS: Status: ACTIVE | Noted: 2023-11-09

## 2023-11-09 PROBLEM — R60.9 PERIPHERAL EDEMA: Status: ACTIVE | Noted: 2023-11-09

## 2023-11-09 PROBLEM — N39.41 URGE INCONTINENCE: Status: ACTIVE | Noted: 2023-11-09

## 2023-11-09 PROBLEM — E87.8 FLUID VOLUME DISORDER: Status: ACTIVE | Noted: 2023-11-09

## 2023-11-09 PROBLEM — R68.89 ACTIVITY INTOLERANCE: Status: ACTIVE | Noted: 2023-11-09

## 2023-11-09 PROBLEM — B37.31 VAGINAL YEAST INFECTION: Status: ACTIVE | Noted: 2023-11-09

## 2023-11-09 LAB
25(OH)D3 SERPL-MCNC: 52 NG/ML (ref 30–100)
ALBUMIN SERPL BCP-MCNC: 4.1 G/DL (ref 3.4–5)
ALP SERPL-CCNC: 102 U/L (ref 33–136)
ALT SERPL W P-5'-P-CCNC: 12 U/L (ref 7–45)
ANION GAP SERPL CALC-SCNC: 14 MMOL/L (ref 10–20)
AST SERPL W P-5'-P-CCNC: 17 U/L (ref 9–39)
BASOPHILS # BLD AUTO: 0.03 X10*3/UL (ref 0–0.1)
BASOPHILS NFR BLD AUTO: 0.4 %
BILIRUB SERPL-MCNC: 0.6 MG/DL (ref 0–1.2)
BUN SERPL-MCNC: 22 MG/DL (ref 6–23)
CALCIUM SERPL-MCNC: 9.7 MG/DL (ref 8.6–10.6)
CHLORIDE SERPL-SCNC: 103 MMOL/L (ref 98–107)
CHOLEST SERPL-MCNC: 165 MG/DL (ref 0–199)
CHOLESTEROL/HDL RATIO: 3.6
CO2 SERPL-SCNC: 30 MMOL/L (ref 21–32)
CREAT SERPL-MCNC: 0.73 MG/DL (ref 0.5–1.05)
EOSINOPHIL # BLD AUTO: 0.26 X10*3/UL (ref 0–0.4)
EOSINOPHIL NFR BLD AUTO: 3.2 %
ERYTHROCYTE [DISTWIDTH] IN BLOOD BY AUTOMATED COUNT: 12 % (ref 11.5–14.5)
EST. AVERAGE GLUCOSE BLD GHB EST-MCNC: 120 MG/DL
GFR SERPL CREATININE-BSD FRML MDRD: 84 ML/MIN/1.73M*2
GLUCOSE SERPL-MCNC: 102 MG/DL (ref 74–99)
HBA1C MFR BLD: 5.8 %
HCT VFR BLD AUTO: 43.1 % (ref 36–46)
HDLC SERPL-MCNC: 45.5 MG/DL
HGB BLD-MCNC: 14.1 G/DL (ref 12–16)
IMM GRANULOCYTES # BLD AUTO: 0.04 X10*3/UL (ref 0–0.5)
IMM GRANULOCYTES NFR BLD AUTO: 0.5 % (ref 0–0.9)
LDLC SERPL CALC-MCNC: 73 MG/DL
LYMPHOCYTES # BLD AUTO: 2.55 X10*3/UL (ref 0.8–3)
LYMPHOCYTES NFR BLD AUTO: 31 %
MCH RBC QN AUTO: 32.6 PG (ref 26–34)
MCHC RBC AUTO-ENTMCNC: 32.7 G/DL (ref 32–36)
MCV RBC AUTO: 100 FL (ref 80–100)
MONOCYTES # BLD AUTO: 0.65 X10*3/UL (ref 0.05–0.8)
MONOCYTES NFR BLD AUTO: 7.9 %
NEUTROPHILS # BLD AUTO: 4.7 X10*3/UL (ref 1.6–5.5)
NEUTROPHILS NFR BLD AUTO: 57 %
NON HDL CHOLESTEROL: 120 MG/DL (ref 0–149)
NRBC BLD-RTO: 0 /100 WBCS (ref 0–0)
PLATELET # BLD AUTO: 343 X10*3/UL (ref 150–450)
POTASSIUM SERPL-SCNC: 5.1 MMOL/L (ref 3.5–5.3)
PROT SERPL-MCNC: 7.9 G/DL (ref 6.4–8.2)
RBC # BLD AUTO: 4.33 X10*6/UL (ref 4–5.2)
SODIUM SERPL-SCNC: 142 MMOL/L (ref 136–145)
TRIGL SERPL-MCNC: 232 MG/DL (ref 0–149)
TSH SERPL-ACNC: 2.04 MIU/L (ref 0.44–3.98)
VLDL: 46 MG/DL (ref 0–40)
WBC # BLD AUTO: 8.2 X10*3/UL (ref 4.4–11.3)

## 2023-11-09 PROCEDURE — 80053 COMPREHEN METABOLIC PANEL: CPT

## 2023-11-09 PROCEDURE — G0008 ADMIN INFLUENZA VIRUS VAC: HCPCS | Performed by: PHYSICIAN ASSISTANT

## 2023-11-09 PROCEDURE — 83036 HEMOGLOBIN GLYCOSYLATED A1C: CPT

## 2023-11-09 PROCEDURE — 80061 LIPID PANEL: CPT

## 2023-11-09 PROCEDURE — 1159F MED LIST DOCD IN RCRD: CPT | Performed by: PHYSICIAN ASSISTANT

## 2023-11-09 PROCEDURE — 36415 COLL VENOUS BLD VENIPUNCTURE: CPT

## 2023-11-09 PROCEDURE — 84443 ASSAY THYROID STIM HORMONE: CPT

## 2023-11-09 PROCEDURE — 1036F TOBACCO NON-USER: CPT | Performed by: PHYSICIAN ASSISTANT

## 2023-11-09 PROCEDURE — 90662 IIV NO PRSV INCREASED AG IM: CPT | Performed by: PHYSICIAN ASSISTANT

## 2023-11-09 PROCEDURE — 1160F RVW MEDS BY RX/DR IN RCRD: CPT | Performed by: PHYSICIAN ASSISTANT

## 2023-11-09 PROCEDURE — 85025 COMPLETE CBC W/AUTO DIFF WBC: CPT

## 2023-11-09 PROCEDURE — 82306 VITAMIN D 25 HYDROXY: CPT

## 2023-11-09 PROCEDURE — 3077F SYST BP >= 140 MM HG: CPT | Performed by: PHYSICIAN ASSISTANT

## 2023-11-09 PROCEDURE — 1126F AMNT PAIN NOTED NONE PRSNT: CPT | Performed by: PHYSICIAN ASSISTANT

## 2023-11-09 PROCEDURE — 99213 OFFICE O/P EST LOW 20 MIN: CPT | Performed by: PHYSICIAN ASSISTANT

## 2023-11-09 PROCEDURE — 3079F DIAST BP 80-89 MM HG: CPT | Performed by: PHYSICIAN ASSISTANT

## 2023-11-09 RX ORDER — PAROXETINE HYDROCHLORIDE 20 MG/1
20 TABLET, FILM COATED ORAL DAILY
Qty: 90 TABLET | Refills: 1 | Status: SHIPPED | OUTPATIENT
Start: 2023-11-09 | End: 2024-05-14 | Stop reason: SDUPTHER

## 2023-11-09 RX ORDER — LEVOTHYROXINE SODIUM 88 UG/1
88 TABLET ORAL DAILY
Qty: 100 TABLET | Refills: 1 | Status: SHIPPED | OUTPATIENT
Start: 2023-11-09

## 2023-11-09 NOTE — PROGRESS NOTES
Subjective   Maryebth Duggan is a 78 y.o. female who presents for 6month f/u. Generally doing well. No new complaints.      HTN, HLD, CAD: Compliant with losartan-HCTZ 100-25 mg, metoprolol succinate 100 mg for HTN. Stable on 81 mg ASA and atorvastatin 80 mg. She does check BP at home, is controlled. She does endorse some dizziness when walking. Denies any headache, chest pain, SOB/WARNER, palpitations, LE edema.  Follows with cardiology     Anxiety and depression: Feeling well on paroxetine 20 mg daily.      Urinary incontinence: chronic. Following with UroGyn. Wearing depends usually cannot make it to the restroom in time.      Health maintenance:  Immunizations:  -Flu: deferred to fall 2023  -Pneumococcal: UTD  -Shingrix: recommended, obtain from local pharmacy  -Tdap: Recommended, obtain from pharmacy  Mammogram UTD (last 2/22/22) - declined further testing  Colonoscopy UTD (last 2/11/22) - no future screening d/t age  DEXA UTD (last 5/11/18) - normal bone density, declined further screening  Has healthcare POA/living well    12 point ROS reviewed and negative other than as stated in HPI    /84   Pulse 66   Wt 96.2 kg (212 lb)   SpO2 95%   BMI 37.55 kg/m²   Objective   Physical Exam  Vitals reviewed.   Constitutional:       General: She is not in acute distress.     Appearance: Normal appearance.   HENT:      Head: Normocephalic and atraumatic.   Eyes:      General: No scleral icterus.     Extraocular Movements: Extraocular movements intact.      Conjunctiva/sclera: Conjunctivae normal.      Pupils: Pupils are equal, round, and reactive to light.   Cardiovascular:      Rate and Rhythm: Normal rate and regular rhythm.      Pulses: Normal pulses.      Heart sounds: Normal heart sounds. No murmur heard.     No friction rub. No gallop.   Pulmonary:      Effort: Pulmonary effort is normal.      Breath sounds: Normal breath sounds. No stridor. No wheezing, rhonchi or rales.   Abdominal:      General: Bowel  sounds are normal.      Palpations: Abdomen is soft. There is no mass.      Tenderness: There is no abdominal tenderness.   Musculoskeletal:         General: Normal range of motion.      Right lower leg: No edema.      Left lower leg: No edema.   Skin:     General: Skin is warm and dry.   Neurological:      Mental Status: She is oriented to person, place, and time. Mental status is at baseline.      Cranial Nerves: No cranial nerve deficit.      Gait: Gait normal.   Psychiatric:         Mood and Affect: Mood normal.         Behavior: Behavior normal.         Assessment/Plan   Problem List Items Addressed This Visit       Anxiety and depression     Stable.  Continue paroxetine 20 mg daily         Relevant Medications    PARoxetine (Paxil) 20 mg tablet    Essential hypertension - Primary     Well-controlled.  Continue losartan-HCTZ 100-25 mg and metoprolol succinate 100 mg.  Continue monitoring BP at home regularly.  Follow with cardiology.         Relevant Orders    CBC and Auto Differential (Completed)    Comprehensive Metabolic Panel (Completed)    Hyperlipidemia     Continue 80 mg aspirin and atorvastatin 80 mg.  Follow Mediterranean-style diet and exercise as tolerated.  Follow with cardiology         Relevant Orders    Lipid Panel (Completed)    Hypothyroidism     Continue levothyroxine 88 mcg daily.         Relevant Medications    levothyroxine (Synthroid, Levoxyl) 88 mcg tablet    Other Relevant Orders    TSH with reflex to Free T4 if abnormal (Completed)    Urinary incontinence     Follow with urology. S/p trial of oxybutynin, gemtesa, and detrusor botox injection. Continue wearing depends.  Encourage proper hygiene and washing often to prevent sores         Hyperglycemia     Hemoglobin A1c ordered         Relevant Orders    Hemoglobin A1C (Completed)    Vitamin D deficiency     Vitamin D level ordered         Relevant Orders    Vitamin D 25-Hydroxy,Total (for eval of Vitamin D levels) (Completed)         Follow up in 6 months for MCR wellness or sooner as needed

## 2023-11-12 PROBLEM — B37.31 VAGINAL YEAST INFECTION: Status: RESOLVED | Noted: 2023-11-09 | Resolved: 2023-11-12

## 2023-11-12 PROBLEM — E55.9 VITAMIN D DEFICIENCY: Status: ACTIVE | Noted: 2023-11-12

## 2023-11-12 PROBLEM — R73.9 HYPERGLYCEMIA: Status: ACTIVE | Noted: 2023-11-12

## 2023-11-12 PROBLEM — I82.90 VENOUS THROMBOSIS: Status: RESOLVED | Noted: 2023-11-09 | Resolved: 2023-11-12

## 2023-11-13 NOTE — ASSESSMENT & PLAN NOTE
Follow with urology. S/p trial of oxybutynin, gemtesa, and detrusor botox injection. Continue wearing depends.  Encourage proper hygiene and washing often to prevent sores

## 2023-11-20 NOTE — RESULT ENCOUNTER NOTE
Hemoglobin A1c is stable at 5.8%. Triglycerides increased. Cut back on saturated fats (butter, lunch meats, red meats, carbohydrates, etc.)    Remainder of labs look stable. Continue a heart healthy lifestyle

## 2023-11-28 ENCOUNTER — APPOINTMENT (OUTPATIENT)
Dept: UROLOGY | Facility: CLINIC | Age: 78
End: 2023-11-28
Payer: MEDICARE

## 2023-12-22 ENCOUNTER — APPOINTMENT (OUTPATIENT)
Dept: NEUROLOGY | Facility: CLINIC | Age: 78
End: 2023-12-22
Payer: MEDICARE

## 2023-12-22 NOTE — PROGRESS NOTES
Subjective     Marybeth Duggan is a 78 y.o. year old female presenting as a new patient for    HPI    Review of Systems    Patient Active Problem List   Diagnosis    Abnormal blood chemistry    Acid reflux    Allergic rhinitis    Anxiety and depression    Bilateral sensorineural hearing loss    Bronchospasm, acute    CAD (coronary artery disease)    Carpal tunnel syndrome    Disorder of bursae and tendons in shoulder region    Cervicalgia    DISH (disseminated idiopathic skeletal hyperostosis)    Disorder of bone and cartilage    DJD (degenerative joint disease), lumbar    Essential hypertension    Fatigue    Forgetfulness    Frequent falls    Hemorrhoids    Hyperlipidemia    Hypothyroidism    Insomnia    Joint pain, knee    Left groin pain    Low back pain    Microscopic hematuria    Osteoarthritis of multiple joints    Postmenopausal disorder    Primary localized osteoarthritis of both knees    Primary localized osteoarthrosis of left hip    Right hip pain    Sleep disorder    Snoring    Urinary frequency    Urinary incontinence    Class 2 severe obesity due to excess calories with serious comorbidity and body mass index (BMI) of 36.0 to 36.9 in adult (CMS/[facility])    Medicare annual wellness visit, subsequent    Activity intolerance    Fluid volume disorder    Lichen sclerosus    Low blood pressure    Pain    Peripheral edema    Urge incontinence    Hyperglycemia    Vitamin D deficiency     Past Medical History:   Diagnosis Date    Personal history of other diseases of the circulatory system 08/08/2013    History of hypertension    Personal history of other mental and behavioral disorders 08/08/2013    History of depression    Pure hypercholesterolemia, unspecified 09/19/2013    Low-density-lipoid-type (LDL) hyperlipoproteinemia     Past Surgical History:   Procedure Laterality Date    GALLBLADDER SURGERY  11/30/2016    Gallbladder Surgery    OTHER SURGICAL HISTORY  10/22/2019    Hip replacement    OTHER SURGICAL  HISTORY  10/22/2019    Thyroidectomy total     Social History     Tobacco Use    Smoking status: Never    Smokeless tobacco: Never   Substance Use Topics    Alcohol use: Yes     Comment: occasional     family history is not on file.    Current Outpatient Medications:     aspirin 81 mg capsule, Take 1 tablet by mouth once daily., Disp: , Rfl:     atorvastatin (Lipitor) 80 mg tablet, Take 1 tablet (80 mg) by mouth once daily at bedtime., Disp: , Rfl:     cholecalciferol (Vitamin D-3) 25 MCG (1000 UT) tablet, Take 1 tablet (25 mcg) by mouth once daily., Disp: , Rfl:     levothyroxine (Synthroid, Levoxyl) 88 mcg tablet, Take 1 tablet (88 mcg) by mouth once daily., Disp: 100 tablet, Rfl: 1    losartan-hydrochlorothiazide (Hyzaar) 100-25 mg tablet, Take 1 tablet by mouth once daily., Disp: , Rfl:     metoprolol succinate XL (Toprol-XL) 100 mg 24 hr tablet, Take 1 tablet (100 mg) by mouth once daily., Disp: , Rfl:     PARoxetine (Paxil) 20 mg tablet, Take 1 tablet (20 mg) by mouth once daily., Disp: 90 tablet, Rfl: 1  No Known Allergies    Objective   Neurological Exam  Physical Exam  GENERAL APPEARANCE:  No distress, alert and cooperative.     CARDIOVASCULAR: Regular, rate and rhythm, without murmur. No carotid bruits. Pulses +2 and equal in all extremities. No edema, or tenderness to palpation.    MENTAL STATE:  Orientation was normal to time, place and person. Recent and remote memory was intact.  Attention span and concentration were normal. Language testing was normal for comprehension, repetition, expression, and naming. Calculation was intact. The patient could correctly interpret a picture, and copy a diagram. General fund of knowledge was intact. Mini-mental status examination was performed with no errors.     OPHTHALMOSCOPIC: The ophthalmoscopic exam normal. The fundi were well visualized with normal disc margins, clear vessels and vascular pulsations. No disc edema. The cup/disk ratio was not enlarged. No  hemorrhages or exudates were present in the posterior segments that were visualized.     CRANIAL NERVES:  Cranial nerves were normal.      CN 2- Visual Acuity  OD: 20/20 (corrected)   OS: 20/20 (corrected); visual fields full to confrontation.      CN 3, 4, 6-  Pupils round, 4 mm in diameter, equally reactive to light. No ptosis. EOMs normal alignment, full range of movement, no nystagmus     CN 5- Facial sensation intact bilaterally. Normal corneal reflexes.      CN 7- Normal and symmetric facial strength. Nasolabial folds symmetric.     CN 8- Hearing intact to finger rub, whisper.      CN 9- Palate elevates symmetrically. Normal gag reflex.      CN 11- Normal strength of shoulder shrug and neck turning      CN 12- Tongue midline, with normal bulk and strength; no fasciculations.     MOTOR:  Motor exam was normal. Muscle bulk and tone were normal in both upper and lower extremities. Muscle strength was 5/5 in distal and proximal muscles in both upper and lower extremities. No fasciculations, tremor or other abnormal movements were present.     REFLEXES:  Right/ Left:  Biceps 2/2, brachioradialis 2/2, triceps 2/2, patellar 2/2, ankle 2/2  Babinski: toes downgoing to plantar stimulation. No clonus, frontal release signs or other pathologic reflexes present.     SENSORY: Sensory exam was intact to light touch, sharp/dull, vibration and position sense in both UE and LE.     COORDINATION: MIREYA were intact in upper and lower extremities.  In UE- finger-nose-finger was intact and in LE- heel-to-shin was intact without dysmetria or overshoot.      GAIT: Station was stable with a normal base and negative Romberg sign. Gait was stable with a normal arm swing and speed. No ataxia, shuffling, steppage or waddling was noted. Tandem gait was intact. Postural reflexes were normal.     Assessment/Plan   {Assess/PlanSmartLinks:19176}

## 2024-01-04 ENCOUNTER — PROCEDURE VISIT (OUTPATIENT)
Dept: UROLOGY | Facility: CLINIC | Age: 79
End: 2024-01-04
Payer: MEDICARE

## 2024-01-04 NOTE — PROGRESS NOTES
Marybeth Duggan 78yr old female    Patient was referred by Dr. Venegas to evaluate urge  incontinence.  Dr. Gardner was present in the office at time of study.  Pre uroflow was completed today with a PVR of 25ml.  Urine was clear for UTI.  Patient did not leak on CLPP/VLPP.  Patient did not have DO with leak.  PVR after study was 0ml.    Patient instructed to increase fluids if blood in the urine or burning.  Patient made aware she may have blood rectally due to cath insertion.  Patient understood and consented to Urodynamics.  Patient will follow up with Dr. Venegas to review results.  12/19/23/ CM

## 2024-02-21 ENCOUNTER — APPOINTMENT (OUTPATIENT)
Dept: PRIMARY CARE | Facility: CLINIC | Age: 79
End: 2024-02-21
Payer: MEDICARE
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
## 2024-03-12 PROBLEM — M25.529 ELBOW PAIN: Status: ACTIVE | Noted: 2024-03-12

## 2024-03-12 PROBLEM — H93.13 TINNITUS OF BOTH EARS: Status: ACTIVE | Noted: 2024-03-12

## 2024-03-12 PROBLEM — R10.9 ABDOMINAL PAIN: Status: ACTIVE | Noted: 2023-05-11

## 2024-03-12 PROBLEM — S52.90XA FRACTURE OF RADIUS: Status: ACTIVE | Noted: 2024-03-12

## 2024-03-12 PROBLEM — R25.2 SPASM: Status: ACTIVE | Noted: 2024-03-12

## 2024-03-12 PROBLEM — Z98.890 HISTORY OF CARDIOVASCULAR SURGERY: Status: ACTIVE | Noted: 2023-04-01

## 2024-03-12 PROBLEM — E66.9 OBESITY WITH BODY MASS INDEX 30 OR GREATER: Status: ACTIVE | Noted: 2024-03-12

## 2024-03-12 PROBLEM — H92.03 OTALGIA OF BOTH EARS: Status: ACTIVE | Noted: 2024-03-12

## 2024-03-12 PROBLEM — Z86.79 HISTORY OF HYPERTENSION: Status: ACTIVE | Noted: 2024-03-12

## 2024-03-12 PROBLEM — Z86.59 HISTORY OF DEPRESSION: Status: ACTIVE | Noted: 2024-03-12

## 2024-03-13 ENCOUNTER — OFFICE VISIT (OUTPATIENT)
Dept: PRIMARY CARE | Facility: CLINIC | Age: 79
End: 2024-03-13
Payer: MEDICARE

## 2024-03-13 ENCOUNTER — HOSPITAL ENCOUNTER (OUTPATIENT)
Dept: RADIOLOGY | Facility: CLINIC | Age: 79
Discharge: HOME | End: 2024-03-13
Payer: MEDICARE

## 2024-03-13 ENCOUNTER — LAB (OUTPATIENT)
Dept: LAB | Facility: LAB | Age: 79
End: 2024-03-13
Payer: MEDICARE

## 2024-03-13 VITALS
DIASTOLIC BLOOD PRESSURE: 100 MMHG | OXYGEN SATURATION: 96 % | WEIGHT: 215 LBS | HEIGHT: 63 IN | HEART RATE: 60 BPM | SYSTOLIC BLOOD PRESSURE: 138 MMHG | BODY MASS INDEX: 38.09 KG/M2 | RESPIRATION RATE: 16 BRPM

## 2024-03-13 DIAGNOSIS — G89.29 CHRONIC LEFT SHOULDER PAIN: ICD-10-CM

## 2024-03-13 DIAGNOSIS — E03.9 HYPOTHYROIDISM, UNSPECIFIED TYPE: ICD-10-CM

## 2024-03-13 DIAGNOSIS — M25.511 CHRONIC RIGHT SHOULDER PAIN: ICD-10-CM

## 2024-03-13 DIAGNOSIS — Z00.00 HEALTHCARE MAINTENANCE: ICD-10-CM

## 2024-03-13 DIAGNOSIS — E78.5 HYPERLIPIDEMIA, UNSPECIFIED HYPERLIPIDEMIA TYPE: ICD-10-CM

## 2024-03-13 DIAGNOSIS — G89.29 CHRONIC RIGHT SHOULDER PAIN: ICD-10-CM

## 2024-03-13 DIAGNOSIS — I10 ESSENTIAL HYPERTENSION: Primary | ICD-10-CM

## 2024-03-13 DIAGNOSIS — M25.512 CHRONIC LEFT SHOULDER PAIN: ICD-10-CM

## 2024-03-13 DIAGNOSIS — I10 ESSENTIAL HYPERTENSION: ICD-10-CM

## 2024-03-13 DIAGNOSIS — E66.01 CLASS 2 SEVERE OBESITY DUE TO EXCESS CALORIES WITH SERIOUS COMORBIDITY AND BODY MASS INDEX (BMI) OF 36.0 TO 36.9 IN ADULT (MULTI): ICD-10-CM

## 2024-03-13 PROBLEM — B35.1 ONYCHOMYCOSIS: Status: ACTIVE | Noted: 2024-03-13

## 2024-03-13 LAB
ALBUMIN SERPL BCP-MCNC: 3.7 G/DL (ref 3.4–5)
ALP SERPL-CCNC: 95 U/L (ref 33–136)
ALT SERPL W P-5'-P-CCNC: 11 U/L (ref 7–45)
ANION GAP SERPL CALC-SCNC: 11 MMOL/L (ref 10–20)
AST SERPL W P-5'-P-CCNC: 15 U/L (ref 9–39)
BASOPHILS # BLD AUTO: 0.05 X10*3/UL (ref 0–0.1)
BASOPHILS NFR BLD AUTO: 0.7 %
BILIRUB SERPL-MCNC: 0.6 MG/DL (ref 0–1.2)
BUN SERPL-MCNC: 21 MG/DL (ref 6–23)
CALCIUM SERPL-MCNC: 9.2 MG/DL (ref 8.6–10.6)
CHLORIDE SERPL-SCNC: 107 MMOL/L (ref 98–107)
CHOLEST SERPL-MCNC: 138 MG/DL (ref 0–199)
CHOLESTEROL/HDL RATIO: 3.3
CO2 SERPL-SCNC: 31 MMOL/L (ref 21–32)
CREAT SERPL-MCNC: 0.78 MG/DL (ref 0.5–1.05)
EGFRCR SERPLBLD CKD-EPI 2021: 78 ML/MIN/1.73M*2
EOSINOPHIL # BLD AUTO: 0.26 X10*3/UL (ref 0–0.4)
EOSINOPHIL NFR BLD AUTO: 3.4 %
ERYTHROCYTE [DISTWIDTH] IN BLOOD BY AUTOMATED COUNT: 12.1 % (ref 11.5–14.5)
EST. AVERAGE GLUCOSE BLD GHB EST-MCNC: 134 MG/DL
GLUCOSE SERPL-MCNC: 127 MG/DL (ref 74–99)
HBA1C MFR BLD: 6.3 %
HCT VFR BLD AUTO: 40.2 % (ref 36–46)
HDLC SERPL-MCNC: 42.4 MG/DL
HGB BLD-MCNC: 13.4 G/DL (ref 12–16)
IMM GRANULOCYTES # BLD AUTO: 0.01 X10*3/UL (ref 0–0.5)
IMM GRANULOCYTES NFR BLD AUTO: 0.1 % (ref 0–0.9)
LDLC SERPL DIRECT ASSAY-MCNC: 74 MG/DL (ref 0–129)
LYMPHOCYTES # BLD AUTO: 2.04 X10*3/UL (ref 0.8–3)
LYMPHOCYTES NFR BLD AUTO: 26.9 %
MCH RBC QN AUTO: 32.4 PG (ref 26–34)
MCHC RBC AUTO-ENTMCNC: 33.3 G/DL (ref 32–36)
MCV RBC AUTO: 97 FL (ref 80–100)
MONOCYTES # BLD AUTO: 0.6 X10*3/UL (ref 0.05–0.8)
MONOCYTES NFR BLD AUTO: 7.9 %
NEUTROPHILS # BLD AUTO: 4.63 X10*3/UL (ref 1.6–5.5)
NEUTROPHILS NFR BLD AUTO: 61 %
NON-HDL CHOLESTEROL: 96 MG/DL (ref 0–149)
NRBC BLD-RTO: 0 /100 WBCS (ref 0–0)
PLATELET # BLD AUTO: 299 X10*3/UL (ref 150–450)
POTASSIUM SERPL-SCNC: 4.3 MMOL/L (ref 3.5–5.3)
PROT SERPL-MCNC: 7 G/DL (ref 6.4–8.2)
RBC # BLD AUTO: 4.13 X10*6/UL (ref 4–5.2)
SODIUM SERPL-SCNC: 145 MMOL/L (ref 136–145)
TSH SERPL-ACNC: 1.95 MIU/L (ref 0.44–3.98)
WBC # BLD AUTO: 7.6 X10*3/UL (ref 4.4–11.3)

## 2024-03-13 PROCEDURE — 3075F SYST BP GE 130 - 139MM HG: CPT | Performed by: INTERNAL MEDICINE

## 2024-03-13 PROCEDURE — 83718 ASSAY OF LIPOPROTEIN: CPT

## 2024-03-13 PROCEDURE — 85025 COMPLETE CBC W/AUTO DIFF WBC: CPT

## 2024-03-13 PROCEDURE — 1170F FXNL STATUS ASSESSED: CPT | Performed by: INTERNAL MEDICINE

## 2024-03-13 PROCEDURE — 73030 X-RAY EXAM OF SHOULDER: CPT | Mod: LT

## 2024-03-13 PROCEDURE — 80053 COMPREHEN METABOLIC PANEL: CPT

## 2024-03-13 PROCEDURE — 82465 ASSAY BLD/SERUM CHOLESTEROL: CPT

## 2024-03-13 PROCEDURE — 83036 HEMOGLOBIN GLYCOSYLATED A1C: CPT

## 2024-03-13 PROCEDURE — 36415 COLL VENOUS BLD VENIPUNCTURE: CPT

## 2024-03-13 PROCEDURE — 1036F TOBACCO NON-USER: CPT | Performed by: INTERNAL MEDICINE

## 2024-03-13 PROCEDURE — 1126F AMNT PAIN NOTED NONE PRSNT: CPT | Performed by: INTERNAL MEDICINE

## 2024-03-13 PROCEDURE — 99214 OFFICE O/P EST MOD 30 MIN: CPT | Performed by: INTERNAL MEDICINE

## 2024-03-13 PROCEDURE — 73030 X-RAY EXAM OF SHOULDER: CPT | Mod: RT

## 2024-03-13 PROCEDURE — 1124F ACP DISCUSS-NO DSCNMKR DOCD: CPT | Performed by: INTERNAL MEDICINE

## 2024-03-13 PROCEDURE — 3080F DIAST BP >= 90 MM HG: CPT | Performed by: INTERNAL MEDICINE

## 2024-03-13 PROCEDURE — 83721 ASSAY OF BLOOD LIPOPROTEIN: CPT

## 2024-03-13 PROCEDURE — 1159F MED LIST DOCD IN RCRD: CPT | Performed by: INTERNAL MEDICINE

## 2024-03-13 PROCEDURE — G0439 PPPS, SUBSEQ VISIT: HCPCS | Performed by: INTERNAL MEDICINE

## 2024-03-13 PROCEDURE — 84443 ASSAY THYROID STIM HORMONE: CPT

## 2024-03-13 PROCEDURE — 99397 PER PM REEVAL EST PAT 65+ YR: CPT | Performed by: INTERNAL MEDICINE

## 2024-03-13 ASSESSMENT — ENCOUNTER SYMPTOMS
NECK PAIN: 0
ABDOMINAL DISTENTION: 0
NUMBNESS: 0
BLOOD IN STOOL: 0
DYSURIA: 0
ABDOMINAL PAIN: 0
RHINORRHEA: 0
DIARRHEA: 0
SORE THROAT: 0
APPETITE CHANGE: 0
COUGH: 0
HEMATURIA: 0
CHILLS: 0
SHORTNESS OF BREATH: 0
HEADACHES: 0
FEVER: 0
OCCASIONAL FEELINGS OF UNSTEADINESS: 0
DIAPHORESIS: 0
LOSS OF SENSATION IN FEET: 0
MYALGIAS: 0
JOINT SWELLING: 0
PALPITATIONS: 0
NECK STIFFNESS: 0
DIZZINESS: 0
WHEEZING: 0
SINUS PRESSURE: 0
LIGHT-HEADEDNESS: 0
WEAKNESS: 0
ARTHRALGIAS: 1
DEPRESSION: 0
DIFFICULTY URINATING: 0
VOMITING: 0
CONSTIPATION: 0
BACK PAIN: 0
NAUSEA: 0
FREQUENCY: 0
FATIGUE: 0

## 2024-03-13 ASSESSMENT — ACTIVITIES OF DAILY LIVING (ADL)
TAKING_MEDICATION: INDEPENDENT
DOING_HOUSEWORK: NEEDS ASSISTANCE
MANAGING_FINANCES: INDEPENDENT
GROCERY_SHOPPING: NEEDS ASSISTANCE
DRESSING: INDEPENDENT
BATHING: INDEPENDENT

## 2024-03-13 ASSESSMENT — PATIENT HEALTH QUESTIONNAIRE - PHQ9
2. FEELING DOWN, DEPRESSED OR HOPELESS: NOT AT ALL
SUM OF ALL RESPONSES TO PHQ9 QUESTIONS 1 AND 2: 0
1. LITTLE INTEREST OR PLEASURE IN DOING THINGS: NOT AT ALL

## 2024-03-13 NOTE — PROGRESS NOTES
"Subjective   Patient ID: Marybeth Duggan is a 78 y.o. female who presents for Medicare Annual Wellness Visit Subsequent (Establish care /Ophthalmology Referral /Shoulder Pain ).    HPI   She presents to establish care with her daughter.  Patient does not speak English, daughter is her .  Today she complains of longstanding bilateral shoulder pain which prevents her from moving shoulder above head.  Patient is interested in intra-articular steroid injections, she has had them in the past and it was effective.    Review of Systems   Constitutional:  Negative for appetite change, chills, diaphoresis, fatigue and fever.   HENT:  Negative for congestion, ear discharge, ear pain, hearing loss, postnasal drip, rhinorrhea, sinus pressure, sore throat and tinnitus.    Eyes:  Negative for visual disturbance.   Respiratory:  Negative for cough, shortness of breath and wheezing.    Cardiovascular:  Negative for chest pain, palpitations and leg swelling.   Gastrointestinal:  Negative for abdominal distention, abdominal pain, blood in stool, constipation, diarrhea, nausea and vomiting.   Genitourinary:  Negative for decreased urine volume, difficulty urinating, dysuria, frequency, hematuria and urgency.   Musculoskeletal:  Positive for arthralgias. Negative for back pain, gait problem, joint swelling, myalgias, neck pain and neck stiffness.   Skin:  Negative for rash.   Neurological:  Negative for dizziness, weakness, light-headedness, numbness and headaches.         Objective   BP (!) 138/100   Pulse 60   Resp 16   Ht 1.6 m (5' 3\")   Wt 97.5 kg (215 lb)   SpO2 96%   BMI 38.09 kg/m²     Physical Exam  Vitals reviewed.   Constitutional:       Appearance: Normal appearance. She is normal weight.   HENT:      Right Ear: Tympanic membrane and external ear normal.      Left Ear: Tympanic membrane and external ear normal.   Eyes:      Extraocular Movements: Extraocular movements intact.      Conjunctiva/sclera: " Conjunctivae normal.      Pupils: Pupils are equal, round, and reactive to light.   Cardiovascular:      Rate and Rhythm: Normal rate and regular rhythm.      Pulses: Normal pulses.   Pulmonary:      Effort: Pulmonary effort is normal.      Breath sounds: Normal breath sounds.   Abdominal:      General: Bowel sounds are normal.      Palpations: Abdomen is soft.   Musculoskeletal:         General: Normal range of motion.      Cervical back: Normal range of motion.   Skin:     General: Skin is warm and dry.   Neurological:      General: No focal deficit present.      Mental Status: She is oriented to person, place, and time.   Psychiatric:         Mood and Affect: Mood normal.         Behavior: Behavior normal.           Assessment/Plan   Problem List Items Addressed This Visit             ICD-10-CM    Essential hypertension - Primary I10     BP elevated in office today.  Patient checks at home usually within normal range however she was rushing to today's visit this is because her to be anxious.  Continue losartan-HCTZ 100-25 mg daily, continue metoprolol  mg daily  Keep BP log  Low sodium diet          Relevant Orders    CBC and Auto Differential    Comprehensive Metabolic Panel    Hyperlipidemia E78.5     Repeat lipid panel  Continue atorvastatin 80 mg at bedtime         Relevant Orders    Cholesterol, LDL Direct    Lipid Panel Non-Fasting    Hypothyroidism E03.9     Repeat TFT  Continue levothyroxine 88 mcg daily         Relevant Orders    TSH with reflex to Free T4 if abnormal    Class 2 severe obesity due to excess calories with serious comorbidity and body mass index (BMI) of 36.0 to 36.9 in adult (CMS/HCC) E66.01, Z68.36    Chronic right shoulder pain M25.511, G89.29     Complains of longstanding right shoulder pain with difficulty moving shoulder above head  X-ray shoulder ordered  Patient would like intra-articular steroid injection, orthopedics referral.         Relevant Orders    Referral to  Orthopaedic Surgery    XR shoulder right 2+ views    Chronic left shoulder pain M25.512, G89.29     Complains of longstanding right shoulder pain with difficulty moving shoulder above head  X-ray shoulder ordered  Patient would like intra-articular steroid injection, orthopedics referral.         Relevant Orders    Referral to Orthopaedic Surgery    XR shoulder left 2+ views    Healthcare maintenance Z00.00     Shingrx recommended         Relevant Orders    CBC and Auto Differential    Cholesterol, LDL Direct    Comprehensive Metabolic Panel    Hemoglobin A1C    Lipid Panel Non-Fasting    TSH with reflex to Free T4 if abnormal   RTC in 3 mo

## 2024-03-13 NOTE — ASSESSMENT & PLAN NOTE
Complains of longstanding right shoulder pain with difficulty moving shoulder above head  X-ray shoulder ordered  Patient would like intra-articular steroid injection, orthopedics referral.

## 2024-03-13 NOTE — ASSESSMENT & PLAN NOTE
BP elevated in office today.  Patient checks at home usually within normal range however she was rushing to today's visit this is because her to be anxious.  Continue losartan-HCTZ 100-25 mg daily, continue metoprolol  mg daily  Keep BP log  Low sodium diet

## 2024-03-18 ENCOUNTER — TELEPHONE (OUTPATIENT)
Dept: PRIMARY CARE | Facility: CLINIC | Age: 79
End: 2024-03-18
Payer: MEDICARE

## 2024-03-18 NOTE — TELEPHONE ENCOUNTER
----- Message from Ashwin Garay MD sent at 3/18/2024  2:07 PM EDT -----  Shoulder x-ray reveals significant osteoarthritis.  Please schedule appointment with orthopedics as discussed during office visit.

## 2024-03-18 NOTE — TELEPHONE ENCOUNTER
Result Communication    Resulted Orders   XR shoulder right 2+ views    Narrative    Interpreted By:  Phoebe Rosario,   STUDY:  XR SHOULDER LEFT 2+ VIEWS; XR SHOULDER RIGHT 2+ VIEWS; ;  3/13/2024  9:58 am      INDICATION:  Signs/Symptoms:chronic shoulder pain.      COMPARISON:  None.      ACCESSION NUMBER(S):  [id number]; [id number]      ORDERING CLINICIAN:  AYDE BARNETT      FINDINGS:  Three views of bilateral hands were performed.      Right shoulder: There are moderate to severe degenerative changes in  the right glenohumeral joint with significant joint space narrowing  and prominent marginal osteophytes. There are also moderate  degenerative changes in the acromioclavicular joint with joint space  narrowing. There is no acute fracture or dislocation. No obvious soft  tissue abnormality.      Left shoulder: No acute fracture or dislocation. There are moderate  degenerative changes in the glenohumeral and acromioclavicular joints  with joint space narrowing and prominent marginal osteophytes. No  obvious soft tissue abnormality is noted.        Impression    Moderate glenohumeral and acromioclavicular joint arthrosis in  bilateral shoulders.          MACRO:  None      Signed by: Phoebe Rosario 3/14/2024 1:37 PM  Dictation workstation:   [id number]       4:27 PM      Results were successfully communicated with the patient and they acknowledged their understanding.

## 2024-03-18 NOTE — RESULT ENCOUNTER NOTE
Shoulder x-ray reveals significant osteoarthritis.  Please schedule appointment with orthopedics as discussed during office visit.

## 2024-03-18 NOTE — TELEPHONE ENCOUNTER
Result Communication    Resulted Orders   XR shoulder right 2+ views    Narrative    Interpreted By:  Phoebe Rosario,   STUDY:  XR SHOULDER LEFT 2+ VIEWS; XR SHOULDER RIGHT 2+ VIEWS; ;  3/13/2024  9:58 am      INDICATION:  Signs/Symptoms:chronic shoulder pain.      COMPARISON:  None.      ACCESSION NUMBER(S):  [id number]; [id number]      ORDERING CLINICIAN:  AYDE BARNETT      FINDINGS:  Three views of bilateral hands were performed.      Right shoulder: There are moderate to severe degenerative changes in  the right glenohumeral joint with significant joint space narrowing  and prominent marginal osteophytes. There are also moderate  degenerative changes in the acromioclavicular joint with joint space  narrowing. There is no acute fracture or dislocation. No obvious soft  tissue abnormality.      Left shoulder: No acute fracture or dislocation. There are moderate  degenerative changes in the glenohumeral and acromioclavicular joints  with joint space narrowing and prominent marginal osteophytes. No  obvious soft tissue abnormality is noted.        Impression    Moderate glenohumeral and acromioclavicular joint arthrosis in  bilateral shoulders.          MACRO:  None      Signed by: Phoebe Rosario 3/14/2024 1:37 PM  Dictation workstation:   [id number]       2:51 PM      Results were not successfully communicated with the patient and they did not acknowledge their understanding.

## 2024-03-28 PROBLEM — F41.8 MIXED ANXIETY DEPRESSIVE DISORDER: Status: ACTIVE | Noted: 2023-05-11

## 2024-03-29 ENCOUNTER — OFFICE VISIT (OUTPATIENT)
Dept: ORTHOPEDIC SURGERY | Facility: CLINIC | Age: 79
End: 2024-03-29
Payer: MEDICARE

## 2024-03-29 VITALS — BODY MASS INDEX: 38.08 KG/M2 | WEIGHT: 214.95 LBS

## 2024-03-29 DIAGNOSIS — G89.29 CHRONIC LEFT SHOULDER PAIN: ICD-10-CM

## 2024-03-29 DIAGNOSIS — M25.511 CHRONIC RIGHT SHOULDER PAIN: ICD-10-CM

## 2024-03-29 DIAGNOSIS — M19.012 OSTEOARTHRITIS OF LEFT SHOULDER, UNSPECIFIED OSTEOARTHRITIS TYPE: ICD-10-CM

## 2024-03-29 DIAGNOSIS — M25.512 CHRONIC LEFT SHOULDER PAIN: ICD-10-CM

## 2024-03-29 DIAGNOSIS — M19.011 OSTEOARTHRITIS OF RIGHT SHOULDER, UNSPECIFIED OSTEOARTHRITIS TYPE: Primary | ICD-10-CM

## 2024-03-29 DIAGNOSIS — G89.29 CHRONIC RIGHT SHOULDER PAIN: ICD-10-CM

## 2024-03-29 PROCEDURE — 99203 OFFICE O/P NEW LOW 30 MIN: CPT | Performed by: ORTHOPAEDIC SURGERY

## 2024-03-29 PROCEDURE — 1036F TOBACCO NON-USER: CPT | Performed by: ORTHOPAEDIC SURGERY

## 2024-03-29 PROCEDURE — 99213 OFFICE O/P EST LOW 20 MIN: CPT | Performed by: ORTHOPAEDIC SURGERY

## 2024-03-29 PROCEDURE — 1159F MED LIST DOCD IN RCRD: CPT | Performed by: ORTHOPAEDIC SURGERY

## 2024-03-29 PROCEDURE — 2500000005 HC RX 250 GENERAL PHARMACY W/O HCPCS: Performed by: ORTHOPAEDIC SURGERY

## 2024-03-29 PROCEDURE — 2500000004 HC RX 250 GENERAL PHARMACY W/ HCPCS (ALT 636 FOR OP/ED): Performed by: ORTHOPAEDIC SURGERY

## 2024-03-29 PROCEDURE — 1160F RVW MEDS BY RX/DR IN RCRD: CPT | Performed by: ORTHOPAEDIC SURGERY

## 2024-03-29 PROCEDURE — 20610 DRAIN/INJ JOINT/BURSA W/O US: CPT | Performed by: ORTHOPAEDIC SURGERY

## 2024-03-29 RX ORDER — TRIAMCINOLONE ACETONIDE 40 MG/ML
40 INJECTION, SUSPENSION INTRA-ARTICULAR; INTRAMUSCULAR
Status: COMPLETED | OUTPATIENT
Start: 2024-03-29 | End: 2024-03-29

## 2024-03-29 RX ORDER — LIDOCAINE HYDROCHLORIDE 10 MG/ML
1 INJECTION INFILTRATION; PERINEURAL
Status: COMPLETED | OUTPATIENT
Start: 2024-03-29 | End: 2024-03-29

## 2024-03-29 RX ADMIN — TRIAMCINOLONE ACETONIDE 40 MG: 400 INJECTION, SUSPENSION INTRA-ARTICULAR; INTRAMUSCULAR at 13:09

## 2024-03-29 RX ADMIN — LIDOCAINE HYDROCHLORIDE 1 ML: 10 INJECTION, SOLUTION INFILTRATION; PERINEURAL at 13:09

## 2024-03-29 ASSESSMENT — PAIN SCALES - GENERAL: PAINLEVEL_OUTOF10: 5 - MODERATE PAIN

## 2024-03-29 ASSESSMENT — ENCOUNTER SYMPTOMS
DEPRESSION: 0
OCCASIONAL FEELINGS OF UNSTEADINESS: 0
LOSS OF SENSATION IN FEET: 0

## 2024-03-29 ASSESSMENT — PAIN DESCRIPTION - DESCRIPTORS: DESCRIPTORS: ACHING

## 2024-03-29 ASSESSMENT — LIFESTYLE VARIABLES: TOTAL SCORE: 0

## 2024-03-29 ASSESSMENT — PATIENT HEALTH QUESTIONNAIRE - PHQ9
2. FEELING DOWN, DEPRESSED OR HOPELESS: NOT AT ALL
1. LITTLE INTEREST OR PLEASURE IN DOING THINGS: NOT AT ALL
SUM OF ALL RESPONSES TO PHQ9 QUESTIONS 1 AND 2: 0

## 2024-03-29 ASSESSMENT — PAIN - FUNCTIONAL ASSESSMENT: PAIN_FUNCTIONAL_ASSESSMENT: 0-10

## 2024-03-29 NOTE — PROGRESS NOTES
Subjective      Chief Complaint   Patient presents with    Right Shoulder - Pain    Left Shoulder - Pain        Subjective      Chief Complaint   Patient presents with    Right Shoulder - Pain    Left Shoulder - Pain        Past Surgical History:   Procedure Laterality Date    GALLBLADDER SURGERY  11/30/2016    Gallbladder Surgery    OTHER SURGICAL HISTORY  10/22/2019    Hip replacement    OTHER SURGICAL HISTORY  10/22/2019    Thyroidectomy total        HPI  This 78 year old patient presents today with bilateral shoulder pain 8. The patient states that the bilateral shoulder pain has been present for years. The patient denies trauma or injury. The patient states that the bilateral shoulder pain is worse with and aggravated by reaching and lifting. The patient states that this shoulder pain is disabling and presents today to discuss further options. The patient states that they have tried tylenol and ibuprofen with no relief.    CARDIOLOGY:   Negative for chest pain, shortness of breath.   RESPIRATORY:   Negative for chest pain, shortness of breath.   MUSCULOSKELETAL:   See HPI for details.   NEUROLOGY:   Negative for tingling, numbness, weakness.    Objective      There were no vitals taken for this visit.     SHOULDER EXAM  Constitutional: Appears stated age. No apparent distress  Labored Breathing: No  Psychiatric: Normal mood and effect.   Neurological: alert and oriented x3  Skin: intact  HEENT: No bruising, otorrhea, rhinorrhea.  MUSCULOSKELETAL: Neck: No tenderness. No pain or limitation with range of motion. Back: No tenderness. Straight leg test negative bilaterally. bilateral shoulder: There is tenderness anteriorly and laterally. Active abduction and active flexion are 0-110 degrees but with pain and guarding. There is pain with and limitation of active and passive internal and external rotation. Comparments are soft. Neurovascular is intact.     XR shoulder right 2+ views    Result Date:  3/14/2024  Interpreted By:  Phoebe Rosario, STUDY: XR SHOULDER LEFT 2+ VIEWS; XR SHOULDER RIGHT 2+ VIEWS; ;  3/13/2024 9:58 am   INDICATION: Signs/Symptoms:chronic shoulder pain.   COMPARISON: None.   ACCESSION NUMBER(S): [id number]; [id number]   ORDERING CLINICIAN: AYDE BARNETT   FINDINGS: Three views of bilateral hands were performed.   Right shoulder: There are moderate to severe degenerative changes in the right glenohumeral joint with significant joint space narrowing and prominent marginal osteophytes. There are also moderate degenerative changes in the acromioclavicular joint with joint space narrowing. There is no acute fracture or dislocation. No obvious soft tissue abnormality.   Left shoulder: No acute fracture or dislocation. There are moderate degenerative changes in the glenohumeral and acromioclavicular joints with joint space narrowing and prominent marginal osteophytes. No obvious soft tissue abnormality is noted.       Moderate glenohumeral and acromioclavicular joint arthrosis in bilateral shoulders.     MACRO: None   Signed by: Phoebe Rosario 3/14/2024 1:37 PM Dictation workstation:   [id number]    XR shoulder left 2+ views    Result Date: 3/14/2024  Interpreted By:  hPoebe Rosario, STUDY: XR SHOULDER LEFT 2+ VIEWS; XR SHOULDER RIGHT 2+ VIEWS; ;  3/13/2024 9:58 am   INDICATION: Signs/Symptoms:chronic shoulder pain.   COMPARISON: None.   ACCESSION NUMBER(S): [id number]; [id number]   ORDERING CLINICIAN: AYDE BARNETT   FINDINGS: Three views of bilateral hands were performed.   Right shoulder: There are moderate to severe degenerative changes in the right glenohumeral joint with significant joint space narrowing and prominent marginal osteophytes. There are also moderate degenerative changes in the acromioclavicular joint with joint space narrowing. There is no acute fracture or dislocation. No obvious soft tissue abnormality.   Left shoulder: No acute fracture or dislocation. There  are moderate degenerative changes in the glenohumeral and acromioclavicular joints with joint space narrowing and prominent marginal osteophytes. No obvious soft tissue abnormality is noted.       Moderate glenohumeral and acromioclavicular joint arthrosis in bilateral shoulders.     MACRO: None   Signed by: Phoebe Rosario 3/14/2024 1:37 PM Dictation workstation:   [id number]      Patient ID: Marybeth Duggan is a 78 y.o. female.    L Inj/Asp: bilateral glenohumeral on 3/29/2024 1:09 PM  Indications: pain  Details: 22 G needle, anterolateral approach  Medications (Right): 40 mg triamcinolone acetonide 40 mg/mL; 1 mL lidocaine 10 mg/mL (1 %)  Medications (Left): 40 mg triamcinolone acetonide 40 mg/mL; 1 mL lidocaine 10 mg/mL (1 %)  Outcome: tolerated well, no immediate complications  Procedure, treatment alternatives, risks and benefits explained, specific risks discussed. Immediately prior to procedure a time out was called to verify the correct patient, procedure, equipment, support staff and site/side marked as required. Patient was prepped and draped in the usual sterile fashion.         Marybeth was seen today for pain and pain.  Diagnoses and all orders for this visit:  Osteoarthritis of right shoulder, unspecified osteoarthritis type (Primary)  Chronic right shoulder pain  -     Referral to Orthopaedic Surgery  Chronic left shoulder pain  -     Referral to Orthopaedic Surgery  Osteoarthritis of left shoulder, unspecified osteoarthritis type  Options are discussed with the patient in the presence of her daughter in detail. The patient is instructed regarding activity modification, ice, physician directed at home gentle strengthening and ROM exercises, and the appropriate use of Tylenol as needed for pain with its potential adverse reactions and side effects. The patient understands. The patient states that despite all the treatment listed above that this left and right shoulder pain is debilitating and   requests a discussion of further options. Cortisone injection to the left and right shoulder is discussed in the office today. This is done in the office today. See procedures below. Return as needed, Please note that this report has been produced using speech recognition software.  It may contain errors related to grammar, punctuation or spelling.  Electronically signed, but not reviewed.       Sunday Glodsmith MD     Post-Care Instructions: Should the patient develop any fevers, chills, bleeding, severe pain patient will contact the office immediately.

## 2024-03-29 NOTE — PATIENT INSTRUCTIONS
Thank you for coming to see us today!     Continue to use tylenol for pain control.   Rest, ice and elevate and remember to do exercises.     Follow up as needed

## 2024-04-07 ENCOUNTER — APPOINTMENT (OUTPATIENT)
Dept: RADIOLOGY | Facility: HOSPITAL | Age: 79
DRG: 082 | End: 2024-04-07
Payer: MEDICARE

## 2024-04-07 ENCOUNTER — HOSPITAL ENCOUNTER (INPATIENT)
Facility: HOSPITAL | Age: 79
LOS: 3 days | Discharge: HOME | DRG: 082 | End: 2024-04-10
Attending: STUDENT IN AN ORGANIZED HEALTH CARE EDUCATION/TRAINING PROGRAM | Admitting: SURGERY
Payer: MEDICARE

## 2024-04-07 ENCOUNTER — APPOINTMENT (OUTPATIENT)
Dept: RADIOLOGY | Facility: HOSPITAL | Age: 79
End: 2024-04-07
Payer: MEDICARE

## 2024-04-07 ENCOUNTER — HOSPITAL ENCOUNTER (EMERGENCY)
Facility: HOSPITAL | Age: 79
Discharge: ANOTHER HOSPITAL | End: 2024-04-07
Payer: MEDICARE

## 2024-04-07 VITALS
DIASTOLIC BLOOD PRESSURE: 90 MMHG | SYSTOLIC BLOOD PRESSURE: 186 MMHG | OXYGEN SATURATION: 100 % | WEIGHT: 220 LBS | HEART RATE: 68 BPM | TEMPERATURE: 97.2 F | HEIGHT: 63 IN | BODY MASS INDEX: 38.98 KG/M2 | RESPIRATION RATE: 18 BRPM

## 2024-04-07 DIAGNOSIS — S06.5XAA SUBDURAL HEMATOMA (MULTI): Primary | ICD-10-CM
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
## 2024-04-07 DIAGNOSIS — I10 ESSENTIAL HYPERTENSION: ICD-10-CM

## 2024-04-07 DIAGNOSIS — T14.90XA TRAUMA: ICD-10-CM

## 2024-04-07 DIAGNOSIS — E78.5 HYPERLIPIDEMIA, UNSPECIFIED HYPERLIPIDEMIA TYPE: Primary | ICD-10-CM

## 2024-04-07 LAB
ABO GROUP (TYPE) IN BLOOD: NORMAL
ABO GROUP (TYPE) IN BLOOD: NORMAL
ANION GAP BLDV CALCULATED.4IONS-SCNC: 10 MMOL/L (ref 10–25)
ANION GAP SERPL CALC-SCNC: 10 MMOL/L
ANTIBODY SCREEN: NORMAL
ANTIBODY SCREEN: NORMAL
APTT PPP: 23 SECONDS (ref 27–38)
BASE EXCESS BLDV CALC-SCNC: 4.4 MMOL/L (ref -2–3)
BASOPHILS # BLD AUTO: 0.05 X10*3/UL (ref 0–0.1)
BASOPHILS NFR BLD AUTO: 0.3 %
BODY TEMPERATURE: 37 DEGREES CELSIUS
BUN SERPL-MCNC: 37 MG/DL (ref 8–25)
CA-I BLDV-SCNC: 1.15 MMOL/L (ref 1.1–1.33)
CALCIUM SERPL-MCNC: 9.1 MG/DL (ref 8.5–10.4)
CARDIAC TROPONIN I PNL SERPL HS: 12 NG/L (ref 0–34)
CHLORIDE BLDV-SCNC: 103 MMOL/L (ref 98–107)
CHLORIDE SERPL-SCNC: 101 MMOL/L (ref 97–107)
CO2 SERPL-SCNC: 27 MMOL/L (ref 24–31)
CREAT SERPL-MCNC: 1 MG/DL (ref 0.4–1.6)
EGFRCR SERPLBLD CKD-EPI 2021: 58 ML/MIN/1.73M*2
EOSINOPHIL # BLD AUTO: 0.11 X10*3/UL (ref 0–0.4)
EOSINOPHIL NFR BLD AUTO: 0.6 %
ERYTHROCYTE [DISTWIDTH] IN BLOOD BY AUTOMATED COUNT: 12.3 % (ref 11.5–14.5)
GLUCOSE BLD MANUAL STRIP-MCNC: 149 MG/DL (ref 74–99)
GLUCOSE BLD MANUAL STRIP-MCNC: 155 MG/DL (ref 74–99)
GLUCOSE BLDV-MCNC: 177 MG/DL (ref 74–99)
GLUCOSE SERPL-MCNC: 165 MG/DL (ref 65–99)
HCO3 BLDV-SCNC: 29.2 MMOL/L (ref 22–26)
HCT VFR BLD AUTO: 43.1 % (ref 36–46)
HCT VFR BLD EST: 41 % (ref 36–46)
HGB BLD-MCNC: 14.2 G/DL (ref 12–16)
HGB BLDV-MCNC: 13.8 G/DL (ref 12–16)
IMM GRANULOCYTES # BLD AUTO: 0.21 X10*3/UL (ref 0–0.5)
IMM GRANULOCYTES NFR BLD AUTO: 1.2 % (ref 0–0.9)
INR PPP: 0.9 (ref 0.9–1.1)
LACTATE BLDV-SCNC: 1.9 MMOL/L (ref 0.4–2)
LYMPHOCYTES # BLD AUTO: 2.98 X10*3/UL (ref 0.8–3)
LYMPHOCYTES NFR BLD AUTO: 16.6 %
MCH RBC QN AUTO: 32.5 PG (ref 26–34)
MCHC RBC AUTO-ENTMCNC: 32.9 G/DL (ref 32–36)
MCV RBC AUTO: 99 FL (ref 80–100)
MONOCYTES # BLD AUTO: 1.34 X10*3/UL (ref 0.05–0.8)
MONOCYTES NFR BLD AUTO: 7.5 %
NEUTROPHILS # BLD AUTO: 13.28 X10*3/UL (ref 1.6–5.5)
NEUTROPHILS NFR BLD AUTO: 73.8 %
NRBC BLD-RTO: 0 /100 WBCS (ref 0–0)
OXYHGB MFR BLDV: 66 % (ref 45–75)
PCO2 BLDV: 43 MM HG (ref 41–51)
PH BLDV: 7.44 PH (ref 7.33–7.43)
PLATELET # BLD AUTO: 341 X10*3/UL (ref 150–450)
PO2 BLDV: 43 MM HG (ref 35–45)
POTASSIUM BLDV-SCNC: 4.4 MMOL/L (ref 3.5–5.3)
POTASSIUM SERPL-SCNC: 4.6 MMOL/L (ref 3.4–5.1)
PROTHROMBIN TIME: 10.2 SECONDS (ref 9.8–12.8)
RBC # BLD AUTO: 4.37 X10*6/UL (ref 4–5.2)
RH FACTOR (ANTIGEN D): NORMAL
RH FACTOR (ANTIGEN D): NORMAL
SAO2 % BLDV: 67 % (ref 45–75)
SODIUM BLDV-SCNC: 138 MMOL/L (ref 136–145)
SODIUM SERPL-SCNC: 138 MMOL/L (ref 133–145)
TROPONIN T SERPL-MCNC: 17 NG/L
WBC # BLD AUTO: 18 X10*3/UL (ref 4.4–11.3)

## 2024-04-07 PROCEDURE — 82947 ASSAY GLUCOSE BLOOD QUANT: CPT

## 2024-04-07 PROCEDURE — 86901 BLOOD TYPING SEROLOGIC RH(D): CPT | Performed by: EMERGENCY MEDICINE

## 2024-04-07 PROCEDURE — 85025 COMPLETE CBC W/AUTO DIFF WBC: CPT | Performed by: EMERGENCY MEDICINE

## 2024-04-07 PROCEDURE — 85610 PROTHROMBIN TIME: CPT

## 2024-04-07 PROCEDURE — 74177 CT ABD & PELVIS W/CONTRAST: CPT | Performed by: RADIOLOGY

## 2024-04-07 PROCEDURE — 70450 CT HEAD/BRAIN W/O DYE: CPT | Performed by: RADIOLOGY

## 2024-04-07 PROCEDURE — 71260 CT THORAX DX C+: CPT | Performed by: RADIOLOGY

## 2024-04-07 PROCEDURE — 99285 EMERGENCY DEPT VISIT HI MDM: CPT | Mod: 25

## 2024-04-07 PROCEDURE — 71045 X-RAY EXAM CHEST 1 VIEW: CPT

## 2024-04-07 PROCEDURE — 2500000004 HC RX 250 GENERAL PHARMACY W/ HCPCS (ALT 636 FOR OP/ED): Performed by: EMERGENCY MEDICINE

## 2024-04-07 PROCEDURE — 70450 CT HEAD/BRAIN W/O DYE: CPT

## 2024-04-07 PROCEDURE — 2500000004 HC RX 250 GENERAL PHARMACY W/ HCPCS (ALT 636 FOR OP/ED)

## 2024-04-07 PROCEDURE — 86901 BLOOD TYPING SEROLOGIC RH(D): CPT

## 2024-04-07 PROCEDURE — 96374 THER/PROPH/DIAG INJ IV PUSH: CPT

## 2024-04-07 PROCEDURE — 74177 CT ABD & PELVIS W/CONTRAST: CPT

## 2024-04-07 PROCEDURE — 2500000001 HC RX 250 WO HCPCS SELF ADMINISTERED DRUGS (ALT 637 FOR MEDICARE OP)

## 2024-04-07 PROCEDURE — 73130 X-RAY EXAM OF HAND: CPT | Mod: RT

## 2024-04-07 PROCEDURE — 99233 SBSQ HOSP IP/OBS HIGH 50: CPT | Performed by: SURGERY

## 2024-04-07 PROCEDURE — 84484 ASSAY OF TROPONIN QUANT: CPT | Performed by: EMERGENCY MEDICINE

## 2024-04-07 PROCEDURE — 99285 EMERGENCY DEPT VISIT HI MDM: CPT | Performed by: STUDENT IN AN ORGANIZED HEALTH CARE EDUCATION/TRAINING PROGRAM

## 2024-04-07 PROCEDURE — 36415 COLL VENOUS BLD VENIPUNCTURE: CPT

## 2024-04-07 PROCEDURE — 72125 CT NECK SPINE W/O DYE: CPT | Performed by: RADIOLOGY

## 2024-04-07 PROCEDURE — 97165 OT EVAL LOW COMPLEX 30 MIN: CPT | Mod: GO

## 2024-04-07 PROCEDURE — 73130 X-RAY EXAM OF HAND: CPT | Mod: RIGHT SIDE | Performed by: RADIOLOGY

## 2024-04-07 PROCEDURE — 2550000001 HC RX 255 CONTRASTS: Performed by: SURGERY

## 2024-04-07 PROCEDURE — 36415 COLL VENOUS BLD VENIPUNCTURE: CPT | Performed by: EMERGENCY MEDICINE

## 2024-04-07 PROCEDURE — 72125 CT NECK SPINE W/O DYE: CPT

## 2024-04-07 PROCEDURE — 84484 ASSAY OF TROPONIN QUANT: CPT

## 2024-04-07 PROCEDURE — G0390 TRAUMA RESPONS W/HOSP CRITI: HCPCS

## 2024-04-07 PROCEDURE — 84132 ASSAY OF SERUM POTASSIUM: CPT

## 2024-04-07 PROCEDURE — 96375 TX/PRO/DX INJ NEW DRUG ADDON: CPT

## 2024-04-07 PROCEDURE — 1200000002 HC GENERAL ROOM WITH TELEMETRY DAILY

## 2024-04-07 PROCEDURE — 2500000002 HC RX 250 W HCPCS SELF ADMINISTERED DRUGS (ALT 637 FOR MEDICARE OP, ALT 636 FOR OP/ED)

## 2024-04-07 PROCEDURE — 71045 X-RAY EXAM CHEST 1 VIEW: CPT | Performed by: STUDENT IN AN ORGANIZED HEALTH CARE EDUCATION/TRAINING PROGRAM

## 2024-04-07 PROCEDURE — 80048 BASIC METABOLIC PNL TOTAL CA: CPT | Performed by: EMERGENCY MEDICINE

## 2024-04-07 PROCEDURE — 97161 PT EVAL LOW COMPLEX 20 MIN: CPT | Mod: GP

## 2024-04-07 RX ORDER — PAROXETINE HYDROCHLORIDE 20 MG/1
20 TABLET, FILM COATED ORAL EVERY MORNING
Status: DISCONTINUED | OUTPATIENT
Start: 2024-04-07 | End: 2024-04-10 | Stop reason: HOSPADM

## 2024-04-07 RX ORDER — ACETAMINOPHEN 325 MG/1
650 TABLET ORAL EVERY 6 HOURS
Status: DISCONTINUED | OUTPATIENT
Start: 2024-04-07 | End: 2024-04-10 | Stop reason: HOSPADM

## 2024-04-07 RX ORDER — LEVOTHYROXINE SODIUM 88 UG/1
88 TABLET ORAL DAILY
Status: DISCONTINUED | OUTPATIENT
Start: 2024-04-07 | End: 2024-04-10 | Stop reason: HOSPADM

## 2024-04-07 RX ORDER — HYDRALAZINE HYDROCHLORIDE 20 MG/ML
5 INJECTION INTRAMUSCULAR; INTRAVENOUS EVERY 4 HOURS PRN
Status: DISCONTINUED | OUTPATIENT
Start: 2024-04-07 | End: 2024-04-10 | Stop reason: HOSPADM

## 2024-04-07 RX ORDER — LABETALOL HYDROCHLORIDE 5 MG/ML
20 INJECTION, SOLUTION INTRAVENOUS EVERY 10 MIN PRN
Status: DISCONTINUED | OUTPATIENT
Start: 2024-04-07 | End: 2024-04-07

## 2024-04-07 RX ORDER — ONDANSETRON HYDROCHLORIDE 2 MG/ML
4 INJECTION, SOLUTION INTRAVENOUS ONCE
Status: COMPLETED | OUTPATIENT
Start: 2024-04-07 | End: 2024-04-07

## 2024-04-07 RX ORDER — OXYCODONE HYDROCHLORIDE 5 MG/1
2.5 TABLET ORAL EVERY 4 HOURS PRN
Status: DISCONTINUED | OUTPATIENT
Start: 2024-04-07 | End: 2024-04-10 | Stop reason: HOSPADM

## 2024-04-07 RX ORDER — METOPROLOL TARTRATE 50 MG/1
50 TABLET ORAL 2 TIMES DAILY
Status: DISCONTINUED | OUTPATIENT
Start: 2024-04-07 | End: 2024-04-10 | Stop reason: HOSPADM

## 2024-04-07 RX ORDER — SODIUM CHLORIDE 9 MG/ML
50 INJECTION, SOLUTION INTRAVENOUS CONTINUOUS
Status: DISCONTINUED | OUTPATIENT
Start: 2024-04-07 | End: 2024-04-09

## 2024-04-07 RX ORDER — HYDRALAZINE HYDROCHLORIDE 20 MG/ML
5 INJECTION INTRAMUSCULAR; INTRAVENOUS ONCE
Status: COMPLETED | OUTPATIENT
Start: 2024-04-07 | End: 2024-04-07

## 2024-04-07 RX ORDER — OXYCODONE HYDROCHLORIDE 5 MG/1
5 TABLET ORAL EVERY 6 HOURS PRN
Status: DISCONTINUED | OUTPATIENT
Start: 2024-04-07 | End: 2024-04-10 | Stop reason: HOSPADM

## 2024-04-07 RX ORDER — BACITRACIN 500 [USP'U]/G
OINTMENT TOPICAL 3 TIMES DAILY
Status: DISCONTINUED | OUTPATIENT
Start: 2024-04-07 | End: 2024-04-10 | Stop reason: HOSPADM

## 2024-04-07 RX ORDER — LEVETIRACETAM 500 MG/1
500 TABLET ORAL 2 TIMES DAILY
Status: DISCONTINUED | OUTPATIENT
Start: 2024-04-07 | End: 2024-04-10 | Stop reason: HOSPADM

## 2024-04-07 RX ORDER — ATORVASTATIN CALCIUM 80 MG/1
80 TABLET, FILM COATED ORAL NIGHTLY
Status: DISCONTINUED | OUTPATIENT
Start: 2024-04-07 | End: 2024-04-10 | Stop reason: HOSPADM

## 2024-04-07 RX ADMIN — ATORVASTATIN CALCIUM 80 MG: 80 TABLET, FILM COATED ORAL at 22:08

## 2024-04-07 RX ADMIN — METOPROLOL TARTRATE 50 MG: 50 TABLET, FILM COATED ORAL at 09:09

## 2024-04-07 RX ADMIN — LEVETIRACETAM 500 MG: 500 TABLET, FILM COATED ORAL at 09:09

## 2024-04-07 RX ADMIN — METOPROLOL TARTRATE 50 MG: 50 TABLET, FILM COATED ORAL at 22:09

## 2024-04-07 RX ADMIN — OXYCODONE HYDROCHLORIDE 2.5 MG: 5 TABLET ORAL at 07:52

## 2024-04-07 RX ADMIN — SODIUM CHLORIDE 75 ML/HR: 9 INJECTION, SOLUTION INTRAVENOUS at 13:22

## 2024-04-07 RX ADMIN — ACETAMINOPHEN 650 MG: 325 TABLET ORAL at 07:53

## 2024-04-07 RX ADMIN — IOHEXOL 85 ML: 350 INJECTION, SOLUTION INTRAVENOUS at 06:44

## 2024-04-07 RX ADMIN — ACETAMINOPHEN 650 MG: 325 TABLET ORAL at 13:23

## 2024-04-07 RX ADMIN — BACITRACIN: 500 OINTMENT TOPICAL at 15:00

## 2024-04-07 RX ADMIN — ACETAMINOPHEN 650 MG: 325 TABLET ORAL at 22:08

## 2024-04-07 RX ADMIN — LEVETIRACETAM 500 MG: 500 TABLET, FILM COATED ORAL at 22:09

## 2024-04-07 RX ADMIN — PAROXETINE 20 MG: 20 TABLET, FILM COATED ORAL at 09:09

## 2024-04-07 RX ADMIN — BACITRACIN: 500 OINTMENT TOPICAL at 10:29

## 2024-04-07 RX ADMIN — HYDRALAZINE HYDROCHLORIDE 5 MG: 20 INJECTION INTRAMUSCULAR; INTRAVENOUS at 02:14

## 2024-04-07 RX ADMIN — LEVOTHYROXINE SODIUM 88 MCG: 0.09 TABLET ORAL at 09:09

## 2024-04-07 RX ADMIN — ONDANSETRON 4 MG: 2 INJECTION INTRAMUSCULAR; INTRAVENOUS at 02:15

## 2024-04-07 ASSESSMENT — PAIN - FUNCTIONAL ASSESSMENT
PAIN_FUNCTIONAL_ASSESSMENT: 0-10

## 2024-04-07 ASSESSMENT — PAIN SCALES - GENERAL
PAINLEVEL_OUTOF10: 5 - MODERATE PAIN
PAINLEVEL_OUTOF10: 8
PAINLEVEL_OUTOF10: 0 - NO PAIN

## 2024-04-07 ASSESSMENT — COLUMBIA-SUICIDE SEVERITY RATING SCALE - C-SSRS
1. IN THE PAST MONTH, HAVE YOU WISHED YOU WERE DEAD OR WISHED YOU COULD GO TO SLEEP AND NOT WAKE UP?: NO
6. HAVE YOU EVER DONE ANYTHING, STARTED TO DO ANYTHING, OR PREPARED TO DO ANYTHING TO END YOUR LIFE?: NO
2. HAVE YOU ACTUALLY HAD ANY THOUGHTS OF KILLING YOURSELF?: NO
6. HAVE YOU EVER DONE ANYTHING, STARTED TO DO ANYTHING, OR PREPARED TO DO ANYTHING TO END YOUR LIFE?: NO
1. IN THE PAST MONTH, HAVE YOU WISHED YOU WERE DEAD OR WISHED YOU COULD GO TO SLEEP AND NOT WAKE UP?: NO

## 2024-04-07 ASSESSMENT — COGNITIVE AND FUNCTIONAL STATUS - GENERAL
TOILETING: A LITTLE
PERSONAL GROOMING: A LITTLE
MOVING FROM LYING ON BACK TO SITTING ON SIDE OF FLAT BED WITH BEDRAILS: A LITTLE
DRESSING REGULAR LOWER BODY CLOTHING: A LITTLE
CLIMB 3 TO 5 STEPS WITH RAILING: A LITTLE
WALKING IN HOSPITAL ROOM: A LITTLE
MOVING FROM LYING ON BACK TO SITTING ON SIDE OF FLAT BED WITH BEDRAILS: A LITTLE
MOBILITY SCORE: 17
CLIMB 3 TO 5 STEPS WITH RAILING: A LOT
EATING MEALS: A LITTLE
TOILETING: A LITTLE
TURNING FROM BACK TO SIDE WHILE IN FLAT BAD: A LITTLE
STANDING UP FROM CHAIR USING ARMS: A LITTLE
MOVING TO AND FROM BED TO CHAIR: A LITTLE
PERSONAL GROOMING: A LITTLE
DAILY ACTIVITIY SCORE: 18
DRESSING REGULAR UPPER BODY CLOTHING: A LITTLE
MOBILITY SCORE: 18
STANDING UP FROM CHAIR USING ARMS: A LITTLE
WALKING IN HOSPITAL ROOM: A LITTLE
DRESSING REGULAR LOWER BODY CLOTHING: A LITTLE
HELP NEEDED FOR BATHING: A LITTLE
DRESSING REGULAR UPPER BODY CLOTHING: A LITTLE
TURNING FROM BACK TO SIDE WHILE IN FLAT BAD: A LITTLE
HELP NEEDED FOR BATHING: A LITTLE
MOVING TO AND FROM BED TO CHAIR: A LITTLE

## 2024-04-07 ASSESSMENT — ACTIVITIES OF DAILY LIVING (ADL): ADL_ASSISTANCE: INDEPENDENT

## 2024-04-07 NOTE — CONSULTS
Reason For Consult  SDH    History Of Present Illness  Marybeth Duggan is a 78 y.o. female presenting with SDH.    78 F h/o HTN, HLD, hypothyroid, CAD (on ASA 81), dizziness, urinary incontinence, p/w syncopal fall, CTH L convexity acute SDH    Patient is Japanese speaking but understands basic english and daughters are at bedside. Per patient, she has history of chronic dizziness, was walking up porch steps, felt dizzy and fell with headstrike. She had brief LOC but denies any weakness, numbness, paresthesias, vomiting, visual changes, fevers, chills. Patient takes ASA for CAD and last dose was 4/6 around noon     Past Medical History  She has a past medical history of Bilateral shoulder pain, Personal history of other diseases of the circulatory system (08/08/2013), Personal history of other mental and behavioral disorders (08/08/2013), Pure hypercholesterolemia, unspecified (09/19/2013), and Thyroid condition.    Surgical History  She has a past surgical history that includes Other surgical history (10/22/2019); Other surgical history (10/22/2019); and Gallbladder surgery (11/30/2016).     Social History  She reports that she has never smoked. She has never used smokeless tobacco. She reports current alcohol use. She reports that she does not use drugs.    Family History  No family history on file.     Allergies  Patient has no known allergies.    Review of Systems  Negative other than above     Physical Exam  NAD  Well perfused  Equal chest rise b/l  Appropriate affect  Stigmata of trauma  RAMIREZ    Aox3  PERRL, EOMI, FS, TM  Mild R PD  BUE/BLE prox 5 dist 5  SILT     Last Recorded Vitals  Blood pressure 137/86, pulse 79, temperature 37 °C (98.6 °F), temperature source Oral, resp. rate 19, SpO2 96 %.    Relevant Results  Imaging as above     Assessment/Plan     78 F h/o HTN, HLD, hypothyroid, CAD (on ASA 81), dizziness, urinary incontinence, p/w syncopal fall, CTH L convexity acute SDH    Patient at neurologic  baseline in the setting of traumatic SDH. Discussed with family and patient regarding risk of expansion and if this were to occur would she want surgery knowing that it would be a life saving procedure but likely not able to restore function. Patient and family agreed that they would want everything that is needed done.     -recommend q1 neurochecks  -please hold ASA and any anticoagulation, will discuss restart plan  -please obtain CBC, RFP, Coag, T+S, UA, CXR, EKG In the event an operative intervention is needed  -no need for platelets at this time but if bleed expands on next CT would recommend transfusion  -recommend keppra 500mg BID for 7 days   -neurosurgery will follow        Chucky Patel MD

## 2024-04-07 NOTE — PROGRESS NOTES
[facility]  TRAUMA ICU - PROGRESS NOTE    Patient Name: Marybeth Duggan  MRN: [id number]  Admit Date: 407  : 1945  AGE: 78 y.o.   GENDER: female  ==============================================================================  MECHANISM OF INJURY:   78y F GLF with headstrike, CT showed left subdural hematoma with 5 mm midline shift.  She additionally has a large hematoma over the left lateral scalp with an overlying abrasion and a right thumb abrasion and hematoma.      LOC (yes/no?): yes  Anticoagulant / Anti-platelet Rx? (for what dx?): ASA (CAD)  Referring Facility Name (N/A for scene EMR run): [facility]    INJURIES:   Left subdural hematoma 7mm with 5mm midline shift    OTHER MEDICAL PROBLEMS:  HTN  HLD  Hypothyroid  CAD (on ASA 81)  urinary incontinence     INCIDENTAL FINDINGS:  none    PROCEDURES:  N/A    ==============================================================================  TODAY'S ASSESSMENT AND PLAN OF CARE:  Marybeth Duggan is a 78 y.o. female in the ICU due to: q1hr neuro checks    NEURO/PAIN/SEDATION: Left subdural hematoma 7mm with 5mm midline shift  - A&Ox3  - repeat head CT this morning stable--CT head  : Stable 7 mm subdural hematoma along the left cerebral convexity   with trace parafalcine extension and 4 mm of left-to-right midline shift; stable forehead hematoma  - keppra ppx  - q4hr neuro checks  - repeat head CT tomorrow morning per NSGY  - pain control with tylenol, low dose oxy PRN    RESPIRATORY:   - room air  - IS  - OOB    CARDIOVASC: CAD on ASA  - continue home metoprolol 50mg BID  - continue home atorvastatin  - hold ASA until cleared by NSGY    GI:   - regular diet, ensures    :   - voiding via external cath  - strict I&Os     FEN:   - replete electrolytes PRN  - daily RFP, Mg    HEMATOLOGIC:   - H/H stable, no concerns for active bleeding  - daily CBC    ENDOCRINE: hypothyroidism  - continue home levothyroxine  -  "SSI    MUSCULOSKELETAL/SKIN:   - PT/OT  - bacitracin to forehead wound    INFECTIOUS DISEASE:   - no abx indicated  - leukocytosis, remains afebrile without concern for infection, trend daily    GI PROPHYLAXIS: none indicated    DVT PROPHYLAXIS: holding chemoprophylaxis until cleared by NSGY, SCDs    DISPOSITION: transfer to floor    Patient seen and discussed with attending Dr. Dorothy Vuong MD  General Surgery PGY2  [facility] [id number]      ==============================================================================  CHIEF COMPLAINT / OVERNIGHT EVENTS / HPI:   [place] [facility] [place] [facility] for a trauma consultation. She has a history of chronic dizziness, was walking up porch steps, and feel with headstrike. She had brief LOC. CT Head showed a subdural hematoma with 5 mm midline shift.  She additionally has a large hematoma over the left lateral scalp with an overlying abrasion and a right thumb abrasion and hematoma.      MEDICAL HISTORY / ROS:  Admission history and ROS reviewed. Pertinent changes as follows: none    PHYSICAL EXAM:  Heart Rate:  [68-85]   Temp:  [36.2 °C (97.2 °F)-37 °C (98.6 °F)]   Resp:  [18-25]   BP: (137-193)/(71-99)   Height:  [160 cm (5' 3\")]   Weight:  [99.8 kg (220 lb)]   SpO2:  [93 %-100 %]   Physical Exam  Constitutional:       Appearance: Normal appearance.   HENT:      Head:      Comments: Left forehead hematoma and abrasion     Nose: Nose normal.      Mouth/Throat:      Mouth: Mucous membranes are moist.   Eyes:      Pupils: Pupils are equal, round, and reactive to light.   Cardiovascular:      Rate and Rhythm: Regular rhythm.   Pulmonary:      Breath sounds: Normal breath sounds.   Abdominal:      General: Abdomen is flat. There is no distension.      Palpations: Abdomen is soft.      Tenderness: There is no abdominal tenderness.   Musculoskeletal:         General: Swelling and tenderness present.      Cervical back: Normal range of motion.      " Comments: Right thumb hematoma and abrasion limiting movement   Skin:     General: Skin is warm and dry.   Neurological:      Mental Status: She is alert and oriented to person, place, and time.   Psychiatric:         Mood and Affect: Mood normal.         Behavior: Behavior normal.         IMAGING SUMMARY:   Repeat CT chest abdomen pelvis 4/7: some fat stranding and hematoma on left gluteus; CAD calcifications     CT head  4/7: Stable 7 mm subdural hematoma along the left cerebral convexity   with trace parafalcine extension and 4 mm of left-to-right midline shift; stable forehead hematoma    XR right hand 4/7: no osseous abnormality    CT Cervical spine 4/7: no fracture or subluxation of cspine    CXR 4/7: cardiomegaly    CT head 4/7: Acute subdural hematoma overlying the left cerebral convexity   measuring 7 mm in maximal thickness. 5 mm rightward midline shift     LABS:  Results from last 7 days   Lab Units 04/07/24  0211   WBC AUTO x10*3/uL 18.0*   HEMOGLOBIN g/dL 14.2   HEMATOCRIT % 43.1   PLATELETS AUTO x10*3/uL 341   NEUTROS PCT AUTO % 73.8   LYMPHS PCT AUTO % 16.6   MONOS PCT AUTO % 7.5   EOS PCT AUTO % 0.6     Results from last 7 days   Lab Units 04/07/24  0310   APTT seconds 23*   INR  0.9     Results from last 7 days   Lab Units 04/07/24  0211   SODIUM mmol/L 138   POTASSIUM mmol/L 4.6   CHLORIDE mmol/L 101   CO2 mmol/L 27   BUN mg/dL 37*   CREATININE mg/dL 1.00   CALCIUM mg/dL 9.1   GLUCOSE mg/dL 165*             I have reviewed all medications, laboratory results, and imaging pertinent for today's encounter.     Awake upright in chair conversant ( limited english) follows all commands ( pantomimed ) CT report as noted above stable symmetric chest expansion RRR ABD bland ready for RNF     This ill patient continues to be at-risk for clinically significant deterioration / failure due to the above mentioned dysfunctional, unstable organ systems.  I have personally identified and managed all complex  critical care issues to prevent aforementioned clinical deterioration.  Critical care time is spent at bedside and/or the immediate area and has included, but is not limited to, the review of diagnostic tests, labs, radiographs, serial assessments of hemodynamics, respiratory status, ventilatory management, and family updates.  Time spent in procedures and teaching are reported separately.      CARE TIME:  LEVEL III     Catrachito De La Cruz MD

## 2024-04-07 NOTE — PROGRESS NOTES
Occupational Therapy    Evaluation    Patient Name: Marybeth Duggan  MRN: [id number]  Today's Date: 4/7/2024  Time Calculation  Start Time: 0835  Stop Time: 0857  Time Calculation (min): 22 min    Assessment  IP OT Assessment  OT Assessment: Pt presents with decreased balance and endruance impairing occupational perofrmance of ADLs and functional mobility  Evaluation/Treatment Tolerance: Patient tolerated treatment well  End of Session Communication: Bedside nurse  End of Session Patient Position: Up in chair, Alarm on  Plan:  Treatment Interventions: ADL retraining, Functional transfer training  OT Frequency: 2 times per week  OT Discharge Recommendations: Low intensity level of continued care, 24 hr supervision due to cognition  OT - OK to Discharge: Yes (indicatres completed eval and discharge recommendation)    Subjective   Current Problem:  1. Subdural hematoma (CMS/HCC)        2. Fall, initial encounter          General:  Reason for Referral: 78y F GLF with headstrike, CT showed left subdural hematoma with 5 mm midline shift.  She additionally has a large hematoma over the left lateral scalp with an overlying abrasion and a right thumb abrasion and hematoma.  Past Medical History Relevant to Rehab: PMH 1. HTN  2. HLD  3. Hypothyroid  4. CAD (on ASA 81)  5. urinary incontinence  Co-Treatment: PT  Co-Treatment Reason: Maximize Pt safety and therapeutic success  Prior to Session Communication: Bedside nurse, Physician  Patient Position Received: Bed, 3 rail up, Alarm on  General Comment: pleasant and cooperative, agreeable to OT. Language barrier as Pt is Pashto speaking primarily, however, know little English to express needs   Precautions:  Medical Precautions: Fall precautions  Vital Signs:  Heart Rate: 82  Heart Rate Source: Monitor  Resp: 21  SpO2: 94 %  BP: 167/76  MAP (mmHg): 101  BP Location: Left arm  BP Method: Automatic  Patient Position: Lying  Pain:  Pain Assessment  Pain Assessment: 0-10  Pain Score:   (Pt reported moderate pain in R thumb and head, Pt had slightl;y increased pain in head sitting upright however reported feeling better than lying down)  Lines/Tubes/Drains:  External Urinary Catheter Female (Active)   Number of days: 0         Objective   Cognition:  Overall Cognitive Status:  (appears WFL)  Orientation Level: Oriented X4  Safety/Judgement:  (appears WFL)           Home Living:  Type of Home: House  Lives With:  (adult dtr)  Home Adaptive Equipment: Walker rolling or standard  Home Layout: One level  Home Access: Stairs to enter with rails  Entrance Stairs-Number of Steps: 2  Bathroom Shower/Tub: Tub/shower unit  Bathroom Toilet: Standard  Bathroom Equipment: Grab bars in shower, Shower chair with back  Home Living Comments: Pt ? historian d/t language barrier   Prior Function:  Level of [place]: Needs assistance with ADLs, Needs assistance with homemaking  Receives Help From: Family (dtr)  ADL Assistance:  (dtr assists with dressing and bathing)  Homemaking Assistance:  (dtr performs house management and driving)  Ambulatory Assistance:  (Pt reported uses FWW)  Hand Dominance: Right  IADL History:  Current License: No  Mode of Transportation: Family  ADL:  Eating Deficit:  (setup only)  Grooming Deficit:  (SBA with setup anticipated)  Bathing Deficit:  (min A anticipated 2/2 clothing management and functional mobility)  UE Dressing Deficit:  (SBA with setups eated at least 2/2 ROM/balance)  LE Dressing Deficit:  (min A at least 2/2 ROM/balance)  Toileting Deficit:  (total, external catheter; CGA at least 2/2 clothing management and functional mobility)  Activity Tolerance:  Endurance: Decreased tolerance for upright activites  Early Mobility/Exercise Safety Screen: Proceed with mobilization - No exclusion criteria met    Bed Mobility/Transfers: Bed Mobility  Bed Mobility: Yes  Bed Mobility 1  Bed Mobility 1: Supine to sitting  Level of Assistance 1: Minimum assistance  Bed Mobility Comments  1: VC for sequencing  Functional Mobility  Functional Mobility Performed: Yes  Functional Mobility 1  Comments 1: Pt performed functional mobility from bed to chair CGA HHA   and Transfers  Transfer: Yes  Transfer 1  Transfer From 1: Bed to  Transfer to 1: Stand  Technique 1: Sit to stand  Transfer Level of Assistance 1: Hand held assistance, Contact guard  Trials/Comments 1: 1x  Transfers 2  Transfer From 2: Stand to  Transfer to 2: Chair with arms  Technique 2: Stand to sit  Transfer Level of Assistance 2: Hand held assistance, Contact guard  Trials/Comments 2: 1x; VC for safety    Vision: Vision - Basic Assessment  Current Vision: No visual deficits     Sensation:  Light Touch: No apparent deficits    Hand Function:  Hand Function  Gross Grasp:  (L UE WFL; R hand limited d/t R thumb pain, digits WFL)  Coordination: Functional  Extremities: RUE   RUE : Within Functional Limits, LUE   LUE: Within Functional Limits, RLE   RLE : Within Functional Limits, and LLE   LLE : Within Functional Limits    Outcome Measures: [facility] Daily Activity  Putting on and taking off regular lower body clothing: A little  Bathing (including washing, rinsing, drying): A little  Putting on and taking off regular upper body clothing: A little  Toileting, which includes using toilet, bedpan or urinal: A little  Taking care of personal grooming such as brushing teeth: A little  Eating Meals: A little  Daily Activity - Total Score: 18    Confusion Assessment Method-ICU (CAM-ICU)  Feature 1: Acute Onset or Fluctuating Course: Negative  Feature 2: Inattention: Negative  Feature 4: Disorganized Thinking: Negative  Overall CAM-ICU: Negative FSS-ICU  Ambulation: Walks >/ or equal to 50 feet with any assistance x1  Rolling: Minimal assistance (performs 75% or more of task)  Sitting: Supervision or set-up only  Transfer Sit-to-Stand: Minimal assistance (performs 75% or more of task)  Transfer Supine-to-Sit: Minimal assistance (performs 75% or more of  task)  Total Score: 19 ICU Mobility Screen  Early Mobility/Exercise Safety Screen: Proceed with mobilization - No exclusion criteria met,          Education Documentation  Precautions, taught by Shira Chambers OT at 4/7/2024 12:25 PM.  Learner: Patient  Readiness: Acceptance  Method: Explanation  Response: Verbalizes Understanding, Needs Reinforcement    ADL Training, taught by Shira Chambers OT at 4/7/2024 12:25 PM.  Learner: Patient  Readiness: Acceptance  Method: Explanation  Response: Verbalizes Understanding, Needs Reinforcement      Goals:     Encounter Problems       Encounter Problems (Active)       ADLs       Patient will complete toileting including hygiene clothing management/hygiene with modified independent level of assistance and DME prn. (Progressing)       Start:  04/07/24    Expected End:  04/21/24               BALANCE       Pt will maintain dynamic standing balance >10minutes during ADL task with modified independent level of assistance in order to demonstrate decreased risk of falling and improved postural control. (Progressing)       Start:  04/07/24    Expected End:  04/21/24               MOBILITY       Patient will perform Functional mobility min Household distances/Community Distances with modified independent level of assistance and least restrictive device in order to improve safety and functional mobility. (Progressing)       Start:  04/07/24    Expected End:  04/21/24               TRANSFERS       Patient will perform bed mobility independent level of assistance in order to improve safety and independence with mobility (Progressing)       Start:  04/07/24    Expected End:  04/21/24            Patient will complete functional transfers with least restrictive device with modified independent level of assistance. (Progressing)       Start:  04/07/24    Expected End:  04/21/24 04/07/24 at 12:27 PM   Shira Chambers OT   Rehab Office: [phone]

## 2024-04-07 NOTE — PROGRESS NOTES
Pharmacy Medication History Review    Marybeth Duggan is a 78 y.o. female admitted for No Principal Problem currently listed. Pharmacy reviewed the patient's rqlbw-[id number] medications and allergies for accuracy.    The list below reflects the updated PTA list. Comments regarding how patient may be taking medications differently can be found in the Admit Orders Activity  Prior to Admission Medications   Prescriptions Informant Patient Daughters Reported? Taking?   PARoxetine (Paxil) 20 mg tablet Child Yes Yes   Sig: Take 1 tablet (20 mg) by mouth once daily in the morning   aspirin 81 mg capsule Child Yes Yes   Sig: Take 1 tablet by mouth once daily in the morning.   atorvastatin (Lipitor) 80 mg tablet Child Yes Yes   Sig: Take 1 tablet (80 mg) by mouth once daily at bedtime.   cholecalciferol (Vitamin D-3) 25 MCG (1000 UT) tablet Child Yes Yes   Sig: Take 1 tablet (25 mcg) by mouth once daily in the morning.   levothyroxine (Synthroid, Levoxyl) 88 mcg tablet Child Yes Yes   Sig: Take 1 tablet (88 mcg) by mouth once daily in the morning      metoprolol succinate XL (Toprol-XL) 100 mg 24 hr tablet Child Yes Yes   Sig: Take 1 tablet (100 mg) by mouth once daily in the morning.      Facility-Administered Medications: None        The list below reflects the updated allergy list. Please review each documented allergy for additional clarification and justification.  Allergies  Reviewed by Mykel Dash on 4/7/2024   No Known Allergies         Patient accepts M2B at discharge. Pharmacy has been updated to [facility].    Sources used to complete the med history include:  Patient interviewed but does not speak English so both daughters at bedside answered questions asked about moms medications, patient and daughters alert, cooperative and pleasant  [facility] fill history reviewed  3/13/24  Office Visit reviewed    Below are additional concerns with the patient's PTA list.  Patient has a history of taking  Losartan-HCTZ 100-25 mg tablet daily but daughter states she no longer takes this medication. Recent Internal medicine wellness visit from 3/13/24 does not mention discontinuing.    ---------------------------------  Mykel Dash CPhT  Transitions of Care Technician  Medication reconciliation complete  Please reach out via [employer] Secure Chat for questions,   or if no response call ebindle or [employer].  [facility]

## 2024-04-07 NOTE — SIGNIFICANT EVENT
Repeat CT head reviewed and stable. Will plan for AM CTH on 4/8 for 24 hour stability of acute SDH.

## 2024-04-07 NOTE — ED PROVIDER NOTES
CC: No chief complaint on file.     HPI: Marybeth Duggan is an 78 y.o. female with history including HTN, HLD, hypothyroidism, obesity presenting to the emergency department for trauma consult.  Patient was transferred after being found to have a subdural following a fall from standing up some steps.  Subdural was 7 mm with midline shift.  Patient is currently neurologically intact with no deficits.  Patient also has tenderness to the hand.  Denying chest and abdominal pain.  Denies blood thinners.  Patient is currently on aspirin    Triage note was reviewed and  agree with it  Limitations to History:  none  Additional History Obtained from: PCP visit from 3/13/2024    PMHx/PSHx:  Per HPI.   - has a past medical history of Bilateral shoulder pain, Personal history of other diseases of the circulatory system (08/08/2013), Personal history of other mental and behavioral disorders (08/08/2013), Pure hypercholesterolemia, unspecified (09/19/2013), and Thyroid condition.  - has a past surgical history that includes Other surgical history (10/22/2019); Other surgical history (10/22/2019); and Gallbladder surgery (11/30/2016).    Social History:  - Tobacco:  reports that she has never smoked. She has never used smokeless tobacco.   - Alcohol:  reports current alcohol use.   - Drugs:  reports no history of drug use.     Medications: Reviewed in EMR.     Allergies:  Patient has no known allergies.    ???????????????????????????????????????????????????????????????  Triage Vitals:  T    HR    BP    RR    O2        Physical Exam  Vitals and nursing note reviewed.   Constitutional:       General: She is not in acute distress.  HENT:      Head: Normocephalic.      Comments: Contusion to the left parietal lobe.   Eyes:      Conjunctiva/sclera: Conjunctivae normal.   Cardiovascular:      Rate and Rhythm: Normal rate and regular rhythm.   Pulmonary:      Effort: Pulmonary effort is normal. No respiratory distress.      Breath  sounds: Normal breath sounds.   Abdominal:      General: There is no distension.      Palpations: Abdomen is soft.   Musculoskeletal:         General: No deformity.      Cervical back: Normal range of motion.      Comments: Abrasion and bruising overlying the right wrist and hand. Snuff box tenderness.    Skin:     General: Skin is warm and dry.   Neurological:      Mental Status: She is alert and oriented to person, place, and time.      Comments: 5/5 upper and lower extremity strength. AXOx4. Sensation to light touch intact in the bilateral upper and lower extremity.    Psychiatric:         Mood and Affect: Mood normal.         Behavior: Behavior normal.       ???????????????????????????????????????????????????????????????      ED Course/Medical Decision Making:    Diagnoses as of 04/07/24 0647   Subdural hematoma (CMS/[facility])   Fall, initial encounter        Patient is a 78-year-old female who is presenting as a transfer from outside hospital for subdural hematoma with midline shift.  Patient's workup from outside hospital was reviewed.  Will add an x-ray of the wrist as well as CT pan scan.  Patient's case was discussed with both trauma as well as neurosurgery.  Repeat coags and troponin were sent given that the troponin was elevated.  Troponin was downtrending.  Labetalol PRNs were ordered to maintain a systolic blood pressure goal less than 160.  Patient will be admitted to the trauma ICU for further management.  Patient did not have the results of her CT scans prior to leaving the department.    External records reviewed: recent inpatient, clinic, and prior ED notes  Diagnostic imaging independently reviewed/interpreted by me (as reflected in MDM) includes: ct head, labs  Social Determinants Affecting Care: None identified  Discussion of management with other providers: ED attending,  trauma, NSGY  Prescription Drug Consideration: considered antihypertensive medications for SBP <160  Escalation of Care:  considered ICU admission    Pt was seen and discussed with ED attending     Impression:   Subdural hematoma with midline shift  Traumatic fall  Wrist pain    Disposition: admit        Procedures ? Smart[employer] last updated 4/7/2024 2:58 AM        Ysabel Chacon MD  Resident  04/07/24 0630

## 2024-04-07 NOTE — ED PROVIDER NOTES
HPI   Chief Complaint   Patient presents with    Fall     Pt presents for fall. Per family pt attempted to walk up front porch stairs and fell hitting her head. Pt states she got dizzy and does not remember anything else. Not taking any blood thinners.          History provided by:  Relative  History limited by:  Mental status change   used: Yes      78-year-old female brought in by her daughter through triage complaining of a head injury.  The patient apparently was walking up her front porch steps and fell and hit her head.  Patient does not really recall this incident.  Unsure exactly when this occurred.  The patient only speaks Solomon Islander.  No other history provided.  Daughter translates.                  [place] Coma Scale Score: 11                     Patient History   Past Medical History:   Diagnosis Date    Bilateral shoulder pain     Personal history of other diseases of the circulatory system 08/08/2013    History of hypertension    Personal history of other mental and behavioral disorders 08/08/2013    History of depression    Pure hypercholesterolemia, unspecified 09/19/2013    Low-density-lipoid-type (LDL) hyperlipoproteinemia    Thyroid condition      Past Surgical History:   Procedure Laterality Date    GALLBLADDER SURGERY  11/30/2016    Gallbladder Surgery    OTHER SURGICAL HISTORY  10/22/2019    Hip replacement    OTHER SURGICAL HISTORY  10/22/2019    Thyroidectomy total     No family history on file.  Social History     Tobacco Use    Smoking status: Never    Smokeless tobacco: Never   Vaping Use    Vaping Use: Never used   Substance Use Topics    Alcohol use: Yes     Comment: occasional    Drug use: Never       Physical Exam   ED Triage Vitals [04/07/24 0036]   Temperature Heart Rate Respirations BP   36.2 °C (97.2 °F) 69 20 (!) 193/99      Pulse Ox Temp src Heart Rate Source Patient Position   98 % -- -- Sitting      BP Location FiO2 (%)     Left arm --       Physical  Exam  General:  Awake, alert,   Head: Normocephalic, large hematoma left frontal region with abrasions  Eyes:  Pupils are equal reactive to light and accommodation,  extraocular motors intact  Ears:  TMs are clear bilaterally, external auditory canals clear  Neck: Supple, trachea midline, no stridor, no bony tenderness  Skin: Warm and dry, no rashes   Lungs: Clear to auscultation bilaterally no acute respiratory distress,   CV: Regular Rate Rhythm with no obvious murmurs gallops rubs noted, no jugular venous distention, no pedal edema   Abdomen: Soft, nontender, nondistended, positive bowel sounds, no peritoneal signs  Neuro:  No gross focal neurologic deficits, GCS 15  Musculoskeletal:  Full range of motion in all 4 extremities  Psychiatric:  Alert oriented x 3, Good insight into condition.  ED Course & MDM   ED Course as of 04/07/24 0154   Sun Apr 07, 2024   0140 Trauma surgeon called I updated him on the patient's condition.  He advised the patient needs [place] trauma [place]. [KW]      ED Course User Index  [KW] Nicolas Arias DO         Diagnoses as of 04/07/24 0154   Subdural hematoma (CMS/[facility])   Trauma       Medical Decision Making  Patient presented through triage with her daughter.  I ordered a head CT based on the chief complaint.  I was alerted that the patient has subdural hematoma.  Patient was brought back to the room.  I examined the patient updated the daughter and the patient through the daughter translating.  Patient requires transfer to trauma center.  I spoke with trauma surgeon Dr. Crow who accepted the care of this patient to [facility].  I updated the daughter on the plan.  Patient was hypertensive with a systolic blood pressure 187 she was ordered 5 mg of hydralazine.  CT C-spine is negative chest x-ray is unremarkable.    35 minutes of total critical care time includes review of laboratory data, radiology results, discussion with consultants, and monitoring for  potential decompensation.  Interventions performed as documented above.  Total care time is exclusive of any separately billable procedures  Procedure  Procedures     Nicolas Arias DO  04/07/24 4279

## 2024-04-07 NOTE — H&P
[facility]  TRAUMA SERVICE - HISTORY AND PHYSICAL / CONSULT    Patient Name: Marybeth Duggan  MRN: [id number]  Admit Date: 407  : 1945  AGE: 78 y.o.   GENDER: female  ==============================================================================  MECHANISM OF INJURY / CHIEF COMPLAINT:   This is a 78-year-old female with a past medical history including hypertension, hyperlipidemia, hypothyroidism and obesity that presents to [facility] emergency department from [facility] for a trauma consultation.  She was found to have a subdural hematoma with 5 mm midline shift after she fell from 1 step backwards striking the left side of her head.  She additionally has a large hematoma over the left lateral scalp with an overlying abrasion.  She also complains of right thumb pain.    LOC (yes/no?):  Yes  Anticoagulant / Anti-platelet Rx? (for what dx?):  ASA  Referring Facility Name (N/A for scene EMR run): [facility]    INJURIES:   7 mm left subdural hematoma with 5 mm shift  Left Scalp hematoma  Left gluteal hematoma    OTHER MEDICAL PROBLEMS:  Hypertension  Hyperlipidemia  Hypothyroidism  Obesity    INCIDENTAL FINDINGS:  Severe coronary artery calcification  Simple renal cyst of the left kidney    ==============================================================================  ADMISSION PLAN OF CARE:  Admit to TICU  # Subdural hematoma  -Repeat head CT at 0700  -Neurosurgery consulted  -Hold DVT prophylaxis until 48 hours from stable head CT  -Hold ASA    # Right gluteal hematoma  -Continue monitoring  -Multimodal analgesia    Consultants notified (specialty, provider name, time):   Neurosurgery    ==============================================================================  PAST MEDICAL HISTORY:   PMH:   Past Medical History:   Diagnosis Date    Bilateral shoulder pain     Personal history of other diseases of the circulatory system 2013    History of hypertension    Personal history  of other mental and behavioral disorders 08/08/2013    History of depression    Pure hypercholesterolemia, unspecified 09/19/2013    Low-density-lipoid-type (LDL) hyperlipoproteinemia    Thyroid condition        PSH:   Past Surgical History:   Procedure Laterality Date    GALLBLADDER SURGERY  11/30/2016    Gallbladder Surgery    OTHER SURGICAL HISTORY  10/22/2019    Hip replacement    OTHER SURGICAL HISTORY  10/22/2019    Thyroidectomy total     FH:   No family history on file.  SOCIAL HISTORY:    Smoking:    Social History     Tobacco Use   Smoking Status Never   Smokeless Tobacco Never       Alcohol:    Social History     Substance and Sexual Activity   Alcohol Use Yes    Comment: occasional       Drug use: Denies    MEDICATIONS:   Prior to Admission medications    Medication Sig Start Date End Date Taking? Authorizing Provider   aspirin 81 mg capsule Take 1 tablet by mouth once daily in the morning.    Historical Provider, MD   atorvastatin (Lipitor) 80 mg tablet Take 1 tablet (80 mg) by mouth once daily at bedtime. 1/7/16   Historical Provider, MD   cholecalciferol (Vitamin D-3) 25 MCG (1000 UT) tablet Take 1 tablet (25 mcg) by mouth once daily in the morning.    Historical Provider, MD   levothyroxine (Synthroid, Levoxyl) 88 mcg tablet Take 1 tablet (88 mcg) by mouth once daily.  Patient taking differently: Take 1 tablet (88 mcg) by mouth once daily in the morning. Take before meals. 11/9/23   Yoana Willoughby PA-C   losartan-hydrochlorothiazide (Hyzaar) 100-25 mg tablet Take 1 tablet by mouth once daily in the morning. 3/28/23   Historical Provider, MD   metoprolol succinate XL (Toprol-XL) 100 mg 24 hr tablet Take 1 tablet (100 mg) by mouth once daily in the morning. 11/30/16   Historical Provider, MD   PARoxetine (Paxil) 20 mg tablet Take 1 tablet (20 mg) by mouth once daily.  Patient taking differently: Take 1 tablet (20 mg) by mouth once daily in the morning. 11/9/23   Yoana Willoughby PA-C     ALLERGIES:   No  Known Allergies    REVIEW OF SYSTEMS:  Review of Systems  PHYSICAL EXAM:  PRIMARY SURVEY:  Airway  Airway is patent.     Breathing  Breathing is normal. Right breath sounds are normal. Left breath sounds are normal.     Circulation  Cardiac rhythm is regular. Rate is regular. There is no murmur present.   Pulses  Radial: 2+ on the right; 2+ on the left.  Pedal: 2+ on the right; 2+ on the left.    Disability  [place] Coma Score  Eye:4   Verbal:5   Motor:6      15  Pupils  Right Pupil:   round and reactive      4 mm  Left Pupil:   round and reactive      4 mm     Motor Strength   strength:  5/5 on the right  5/5 on the left  Dorsiflex strength:  5/5 on the right  5/5 on the left  Plantarflex strength:  5/5 on the right  5/5 on the left  The patient does not have a sensory deficit.       SECONDARY SURVEY/PHYSICAL EXAM:  Physical Exam  Constitutional:       General: She is not in acute distress.     Appearance: She is not ill-appearing.   HENT:      Head: Normocephalic.      Comments: Large hematoma with overlying ecchymosis and small abrasion to the left forehead     Nose: Nose normal.      Mouth/Throat:      Mouth: Mucous membranes are moist.   Eyes:      Extraocular Movements: Extraocular movements intact.      Pupils: Pupils are equal, round, and reactive to light.   Cardiovascular:      Rate and Rhythm: Normal rate and regular rhythm.      Pulses: Normal pulses.      Heart sounds: Normal heart sounds.   Pulmonary:      Effort: Pulmonary effort is normal.      Breath sounds: Normal breath sounds.   Abdominal:      General: Abdomen is flat.      Palpations: Abdomen is soft.   Musculoskeletal:      Cervical back: Normal range of motion and neck supple. No tenderness.      Comments: Ecchymosis of the right thumb with minimal tenderness to palpation no obvious bony step-offs.  ROM limited secondary to pain.  No other significant MSK findings   Skin:     General: Skin is warm and dry.   Neurological:      General: No  focal deficit present.      Mental Status: She is alert and oriented to person, place, and time.      Cranial Nerves: No cranial nerve deficit.      Sensory: No sensory deficit.      Motor: No weakness.       IMAGING SUMMARY:  (summary of findings, not a copy of dictation)  CT Head/Face: 7 mm left subdural hematoma with 5 mm midline shift  CT C-Spine: No cervical spine fractures  CT Chest/Abd/Pelvis: Left gluteal hematoma otherwise no acute traumatic findings  CXR/PXR: Cardiomegaly on chest x-ray otherwise no significant findings  Other(s): Right hand x-ray with no acute fracture    LABS:  Results from last 7 days   Lab Units 04/07/24  0211   WBC AUTO x10*3/uL 18.0*   HEMOGLOBIN g/dL 14.2   HEMATOCRIT % 43.1   PLATELETS AUTO x10*3/uL 341   NEUTROS PCT AUTO % 73.8   LYMPHS PCT AUTO % 16.6   MONOS PCT AUTO % 7.5   EOS PCT AUTO % 0.6     Results from last 7 days   Lab Units 04/07/24  0310   APTT seconds 23*   INR  0.9     Results from last 7 days   Lab Units 04/07/24  0211   SODIUM mmol/L 138   POTASSIUM mmol/L 4.6   CHLORIDE mmol/L 101   CO2 mmol/L 27   BUN mg/dL 37*   CREATININE mg/dL 1.00   CALCIUM mg/dL 9.1   GLUCOSE mg/dL 165*               I have reviewed all laboratory and imaging results ordered/pertinent for this encounter.

## 2024-04-07 NOTE — PROGRESS NOTES
Physical Therapy    Physical Therapy Evaluation    Patient Name: Marybeth Duggan  MRN: [id number]  Today's Date: 4/7/2024   Time Calculation  Start Time: 0835  Stop Time: 0857  Time Calculation (min): 22 min    Assessment/Plan   PT Assessment  PT Assessment Results: Decreased strength, Impaired balance, Decreased endurance, Decreased mobility, Pain  Rehab Prognosis: Good  Medical Staff Made Aware: Yes  End of Session Communication: Bedside nurse  Assessment Comment: Pt demonstrated ability to complete bed mobility, sit<>stand transfer and bed>chair transfer.  Limited mobility this date d/t increased head pain with change in position however vitals remained stable.  Language barrier limiting session as well.  End of Session Patient Position: Up in chair, Alarm on  IP OR SWING BED PT PLAN  Inpatient or Swing Bed: Inpatient  PT Plan  Treatment/Interventions: Bed mobility, Transfer training, Gait training, Stair training, Balance training, Strengthening, Endurance training, Therapeutic exercise, Therapeutic activity  PT Plan: Skilled PT  PT Frequency: 4 times per week  PT Discharge Recommendations: Low intensity level of continued care (With 24/7 assist)  PT Recommended Transfer Status: Assistive device  PT - OK to Discharge: Yes      Subjective   General Visit Information:  General  Reason for Referral: GLF with headstrike, CT showed left subdural hematoma with 5 mm midline shift.  She additionally has a large hematoma over the left lateral scalp with an overlying abrasion and a right thumb abrasion and hematoma.  Repeat CTH 4/7: stable - ok for PT per chart review.  Past Medical History Relevant to Rehab: PMH 1. HTN  2. HLD  3. Hypothyroid  4. CAD (on ASA 81)  5. urinary incontinence  Family/Caregiver Present: No  Co-Treatment: OT  Co-Treatment Reason: Maximize Pt safety and therapeutic success  Prior to Session Communication: Bedside nurse, Physician  Patient Position Received: Bed, 3 rail up, Alarm on  Preferred  Learning Style: auditory, verbal  General Comment: Pt awake, alert and willing to participate in PT session. (Attempted to get the kristy to complete session d/t language barrier however was not working during session.  Pt able to understand little English however able to participate in session safely.   Questionable historian d/t language barrier (Norwegian))  Home Living:  Home Living  Type of Home: House  Lives With:  (Daughter)  Home Adaptive Equipment: Walker rolling or standard  Home Layout:  (2 OZZIE with railing, bed/bath - 1st floor, tub/shower with GB)  Prior Level of Function:  Prior Function Per Pt/Caregiver Report  Level of [place]: Needs assistance with ADLs, Needs assistance with homemaking  Receives Help From: Family  ADL Assistance: Independent (dtr assists with dressing and bathing)  Homemaking Assistance:  (dtr performs house management and driving)  Ambulatory Assistance:  (Pt reported uses FWW)  Precautions:  Precautions  Hearing/Visual Limitations: Appear to be WFL.  Pt able to see how many fingers the therapist was holding up and color of therapists gloves.  Medical Precautions: Fall precautions  Vital Signs:  Vital Signs  Heart Rate: 79 (Post: 76)  Heart Rate Source: Monitor  Resp: 22 (Post; 21)  SpO2: 95 % (Post; 96)  BP: 167/76 (EOB: 140/110; Post: 134/84)  MAP (mmHg): 101 (EOB: 116; Post: 101)  BP Location: Left arm  BP Method: Automatic  Patient Position:  (Supine start, sitting end of session.)    Objective   Pain:  Pain Assessment  Pain Assessment:  (Pt reported moderate pain in R thumb and head, Pt had slightly increased pain in head sitting upright however reported feeling better than lying down)  Cognition:  Cognition  Overall Cognitive Status: Within Functional Limits  Orientation Level: Oriented X4    General Assessments:  General Observation  General Observation: Teley     Activity Tolerance  Endurance: Tolerates less than 10 min exercise, no significant change in vital  signs  Early Mobility/Exercise Safety Screen: Proceed with mobilization - No exclusion criteria met    Sensation  Light Touch: No apparent deficits    Strength  Strength Comments: Not formally assessed however at least 3/5 shown through functional mobility       Static Sitting Balance  Static Sitting-Balance Support: Bilateral upper extremity supported  Static Sitting-Level of Assistance: Contact guard    Static Standing Balance  Static Standing-Balance Support: Bilateral upper extremity supported  Static Standing-Level of Assistance: Contact guard  Functional Assessments:  ADL  ADL's Addressed:  (Refer to OTs note for additional information)    Bed Mobility  Bed Mobility: Yes  Bed Mobility 1  Bed Mobility 1: Supine to sitting  Level of Assistance 1: Minimum assistance (x1)  Bed Mobility Comments 1: HOB elevated, cues for sequencing, assistance with advancing trunk upright    Transfers  Transfer: Yes  Transfer 1  Transfer From 1: Sit to, Stand to  Transfer to 1: Sit, Stand  Technique 1: Sit to stand, Stand to sit  Transfer Device 1:  (HHA)  Transfer Level of Assistance 1: Contact guard (x1)  Transfers 2  Transfer From 2: Bed to  Transfer to 2: Chair with arms  Technique 2: To left  Transfer Device 2:  (HHA)  Transfer Level of Assistance 2: Contact guard  Trials/Comments 2: Pt demonstrating completing ~4-5 steps for bed>chair transfer    Ambulation/Gait Training  Ambulation/Gait Training Performed:  (Refer to transfer section for additional information)    Stairs  Stairs: No  Extremity/Trunk Assessments:  RLE   RLE : Within Functional Limits  LLE   LLE : Within Functional Limits  Outcome Measures:  AMPAC Basic Mobility  Turning from your back to your side while in a flat bed without using bedrails: A little  Moving from lying on your back to sitting on the side of a flat bed without using bedrails: A little  Moving to and from bed to chair (including a wheelchair): A little  Standing up from a chair using your arms  (e.g. wheelchair or bedside chair): A little  To walk in hospital room: A little  Climbing 3-5 steps with railing: A lot  Basic Mobility - Total Score: 17    FSS-ICU  Ambulation: Walks <50 feet with any assistance x1 or walks any distance with assistance x2 people  Rolling: Minimal assistance (performs 75% or more of task)  Sitting: Supervision or set-up only  Transfer Sit-to-Stand: Supervision or set-up only  Transfer Supine-to-Sit: Minimal assistance (performs 75% or more of task)  Total Score: 19    Encounter Problems       Encounter Problems (Active)       Balance       Pt will demonstrated ability to score at least 24/28 on the Tinetti balance assessment tool to ensure safety upon D/C.  (Progressing)       Start:  04/07/24    Expected End:  04/21/24               Mobility       Pt will demonstrated ability to ambulate >/=250ft with proper form, LRAD and no balance deficits for safe home going.   (Progressing)       Start:  04/07/24    Expected End:  04/21/24            Pt will demonstrate ability to ascend/descend 2 stairs with unilateral rail and no balance deficits for safe home going.  (Progressing)       Start:  04/07/24    Expected End:  04/21/24               PT Transfers       Pt will demonstrated ability to complete bed mobility and sit<>stand transfers without assistance and use of assistive device to safely return home.  (Progressing)       Start:  04/07/24    Expected End:  04/21/24                   Education Documentation  Body Mechanics, taught by Louise Rivas PT at 4/7/2024  1:25 PM.  Learner: Patient  Readiness: Acceptance  Method: Explanation  Response: Needs Reinforcement    Mobility Training, taught by Louise Rivas PT at 4/7/2024  1:25 PM.  Learner: Patient  Readiness: Acceptance  Method: Explanation  Response: Needs Reinforcement    Education Comments  No comments found.

## 2024-04-08 ENCOUNTER — APPOINTMENT (OUTPATIENT)
Dept: RADIOLOGY | Facility: HOSPITAL | Age: 79
DRG: 082 | End: 2024-04-08
Payer: MEDICARE

## 2024-04-08 LAB — GLUCOSE BLD MANUAL STRIP-MCNC: 113 MG/DL (ref 74–99)

## 2024-04-08 PROCEDURE — 1100000001 HC PRIVATE ROOM DAILY

## 2024-04-08 PROCEDURE — 2500000001 HC RX 250 WO HCPCS SELF ADMINISTERED DRUGS (ALT 637 FOR MEDICARE OP)

## 2024-04-08 PROCEDURE — 2500000004 HC RX 250 GENERAL PHARMACY W/ HCPCS (ALT 636 FOR OP/ED)

## 2024-04-08 PROCEDURE — 2500000002 HC RX 250 W HCPCS SELF ADMINISTERED DRUGS (ALT 637 FOR MEDICARE OP, ALT 636 FOR OP/ED)

## 2024-04-08 PROCEDURE — 70450 CT HEAD/BRAIN W/O DYE: CPT

## 2024-04-08 PROCEDURE — 99231 SBSQ HOSP IP/OBS SF/LOW 25: CPT | Performed by: SURGERY

## 2024-04-08 PROCEDURE — 70450 CT HEAD/BRAIN W/O DYE: CPT | Performed by: STUDENT IN AN ORGANIZED HEALTH CARE EDUCATION/TRAINING PROGRAM

## 2024-04-08 PROCEDURE — 82947 ASSAY GLUCOSE BLOOD QUANT: CPT

## 2024-04-08 RX ORDER — ATORVASTATIN CALCIUM 80 MG/1
80 TABLET, FILM COATED ORAL NIGHTLY
Qty: 100 TABLET | Refills: 2 | Status: SHIPPED | OUTPATIENT
Start: 2024-04-08

## 2024-04-08 RX ADMIN — SODIUM CHLORIDE 75 ML/HR: 9 INJECTION, SOLUTION INTRAVENOUS at 03:08

## 2024-04-08 RX ADMIN — BACITRACIN: 500 OINTMENT TOPICAL at 10:09

## 2024-04-08 RX ADMIN — ACETAMINOPHEN 650 MG: 325 TABLET ORAL at 13:51

## 2024-04-08 RX ADMIN — LEVETIRACETAM 500 MG: 500 TABLET, FILM COATED ORAL at 10:09

## 2024-04-08 RX ADMIN — LEVETIRACETAM 500 MG: 500 TABLET, FILM COATED ORAL at 22:49

## 2024-04-08 RX ADMIN — BACITRACIN: 500 OINTMENT TOPICAL at 14:00

## 2024-04-08 RX ADMIN — ACETAMINOPHEN 650 MG: 325 TABLET ORAL at 03:08

## 2024-04-08 RX ADMIN — ACETAMINOPHEN 650 MG: 325 TABLET ORAL at 22:49

## 2024-04-08 RX ADMIN — ACETAMINOPHEN 650 MG: 325 TABLET ORAL at 10:09

## 2024-04-08 RX ADMIN — LEVOTHYROXINE SODIUM 88 MCG: 0.09 TABLET ORAL at 10:11

## 2024-04-08 RX ADMIN — BACITRACIN: 500 OINTMENT TOPICAL at 22:54

## 2024-04-08 RX ADMIN — PAROXETINE 20 MG: 20 TABLET, FILM COATED ORAL at 10:09

## 2024-04-08 RX ADMIN — METOPROLOL TARTRATE 50 MG: 50 TABLET, FILM COATED ORAL at 22:50

## 2024-04-08 RX ADMIN — METOPROLOL TARTRATE 50 MG: 50 TABLET, FILM COATED ORAL at 10:09

## 2024-04-08 RX ADMIN — ATORVASTATIN CALCIUM 80 MG: 80 TABLET, FILM COATED ORAL at 22:49

## 2024-04-08 RX ADMIN — HYDRALAZINE HYDROCHLORIDE 5 MG: 20 INJECTION INTRAMUSCULAR; INTRAVENOUS at 10:09

## 2024-04-08 SDOH — SOCIAL STABILITY: SOCIAL INSECURITY: FEELS SAFE LIVING IN HOME: YES

## 2024-04-08 SDOH — HEALTH STABILITY: MENTAL HEALTH

## 2024-04-08 ASSESSMENT — COGNITIVE AND FUNCTIONAL STATUS - GENERAL
PERSONAL GROOMING: A LITTLE
HELP NEEDED FOR BATHING: A LITTLE
DRESSING REGULAR LOWER BODY CLOTHING: A LITTLE
CLIMB 3 TO 5 STEPS WITH RAILING: A LOT
DRESSING REGULAR UPPER BODY CLOTHING: A LITTLE
MOVING FROM LYING ON BACK TO SITTING ON SIDE OF FLAT BED WITH BEDRAILS: A LITTLE
EATING MEALS: A LITTLE
WALKING IN HOSPITAL ROOM: A LITTLE
DAILY ACTIVITIY SCORE: 18
TOILETING: A LITTLE
TURNING FROM BACK TO SIDE WHILE IN FLAT BAD: A LITTLE
MOBILITY SCORE: 17
STANDING UP FROM CHAIR USING ARMS: A LITTLE
MOVING TO AND FROM BED TO CHAIR: A LITTLE

## 2024-04-08 ASSESSMENT — PAIN SCALES - GENERAL
PAINLEVEL_OUTOF10: 2
PAINLEVEL_OUTOF10: 0 - NO PAIN
PAINLEVEL_OUTOF10: 0 - NO PAIN

## 2024-04-08 ASSESSMENT — PAIN - FUNCTIONAL ASSESSMENT
PAIN_FUNCTIONAL_ASSESSMENT: 0-10
PAIN_FUNCTIONAL_ASSESSMENT: 0-10

## 2024-04-08 NOTE — CARE PLAN
The patient's goals for the shift include      The clinical goals for the shift include pt will remain free from falls throughout shift

## 2024-04-08 NOTE — CARE PLAN
The patient's goals for the shift include      The clinical goals for the shift include to not be in  pain and get some rest    Over the shift, the patient did not make progress toward the following goals. Barriers to progression include language barrier. Recommendations to address these barriers include using .

## 2024-04-08 NOTE — PROGRESS NOTES
[facility]  TRAUMA SERVICE - PROGRESS NOTE    Patient Name: Marybeth Duggan  MRN: [id number]  Admit Date: 407  : 1945  AGE: 78 y.o.   GENDER: female  ==============================================================================  MECHANISM OF INJURY:   78y F GLF with headstrike, CT showed left subdural hematoma with 5 mm midline shift.  She additionally has a large hematoma over the left lateral scalp with an overlying abrasion and a right thumb abrasion and hematoma.       LOC (yes/no?): yes  Anticoagulant / Anti-platelet Rx? (for what dx?): ASA (CAD)  Referring Facility Name (N/A for scene EMR run): [facility]     INJURIES:   Left subdural hematoma 7mm with 5mm midline shift     OTHER MEDICAL PROBLEMS:  HTN  HLD  Hypothyroid  CAD (on ASA 81)  urinary incontinence      INCIDENTAL FINDINGS:  none     PROCEDURES:  N/A  ==============================================================================  TODAY'S ASSESSMENT AND PLAN OF CARE:  ## subdural hematoma with midline shift  -repeat CT head x2 stable  -Neurosurgery consulted:   -Prophylactic anticoagulation POD2  -ASA restart post bleed day 14  - two week follow up with repeat CTH  - keppra ppx  - q4hr neuro checks  - pain control with tylenol, low dose oxy PRN    ##CAD on ASA  - continue home metoprolol 50mg BID  - continue home atorvastatin  - hold ASA until cleared by NSGY    -regular diet  -scds for dvt prophyl  -daily Am labs, replete lytes prn    Discussed with attending, Dr. Smith.    Mirtha Zuñiga MD  General Surgery PGY1  Trauma Surgery Service  ==============================================================================  CHIEF COMPLAINT / OVERNIGHT EVENTS:   No acute overnight events. Patient reports her pain is well controlled. Still has a headache, but has not changed. Eating and drinking well. No other acute patient concerns.    MEDICAL HISTORY / ROS:  Admission history and ROS reviewed. Pertinent changes  as follows:  none    PHYSICAL EXAM:  Heart Rate:  [55-67]   Temp:  [35.6 °C (96.1 °F)-37.1 °C (98.8 °F)]   Resp:  [18]   BP: (116-174)/(72-84)   SpO2:  [94 %-96 %]     Constitutional:       Appearance: Normal appearance.   HENT:      Head:      Comments: Left forehead hematoma and abrasion     Nose: Nose normal.      Mouth/Throat:      Mouth: Mucous membranes are moist.   Eyes:      Pupils: Pupils are equal, round, and reactive to light.   Cardiovascular:      Rate and Rhythm: Regular rhythm.   Pulmonary:      Breath sounds: Normal breath sounds.   Abdominal:      General: Abdomen is flat. There is no distension.      Palpations: Abdomen is soft.      Tenderness: There is no abdominal tenderness.   Musculoskeletal:         General: Swelling and tenderness present.      Cervical back: Normal range of motion.      Comments: Right thumb hematoma and abrasion limiting movement   Skin:     General: Skin is warm and dry.   Neurological:      Mental Status: She is alert and oriented to person, place, and time.   Psychiatric:         Mood and Affect: Mood normal.         Behavior: Behavior normal.     IMAGING SUMMARY:  (summary of new imaging findings, not a copy of dictation)  No new imaging    LABS:  Results from last 7 days   Lab Units 04/07/24  0211   WBC AUTO x10*3/uL 18.0*   HEMOGLOBIN g/dL 14.2   HEMATOCRIT % 43.1   PLATELETS AUTO x10*3/uL 341   NEUTROS PCT AUTO % 73.8   LYMPHS PCT AUTO % 16.6   MONOS PCT AUTO % 7.5   EOS PCT AUTO % 0.6     Results from last 7 days   Lab Units 04/07/24  0310   APTT seconds 23*   INR  0.9     Results from last 7 days   Lab Units 04/07/24  0211   SODIUM mmol/L 138   POTASSIUM mmol/L 4.6   CHLORIDE mmol/L 101   CO2 mmol/L 27   BUN mg/dL 37*   CREATININE mg/dL 1.00   CALCIUM mg/dL 9.1   GLUCOSE mg/dL 165*     I have reviewed all medications, laboratory results, and imaging pertinent for today's encounter.

## 2024-04-08 NOTE — PROGRESS NOTES
Marybeth Duggan is a 78 y.o. female on day 1 of admission presenting with Subdural hematoma (CMS/HCC).    Subjective   NAEON       Objective     Physical Exam  Awake  Ox3  FCx4  Last Recorded Vitals  Blood pressure 149/72, pulse 66, temperature 36.7 °C (98.1 °F), temperature source Temporal, resp. rate 18, SpO2 94 %.  Intake/Output last 3 Shifts:  I/O last 3 completed shifts:  In: 450 [I.V.:450]  Out: 750 [Urine:750]    Relevant Results                             Assessment/Plan   Principal Problem:    Subdural hematoma (CMS/HCC)    h/o HTN, HLD, hypothyroid, CAD (on ASA 81), dizziness, urinary incontinence, p/w syncopal fall, CTH L convexity acute SDH, rCTH stable    ASSESSMENT  Repeat CTH this AM for 24 hr stability  Arranged 2wk fu with repeat CTH  OK for DVT ppx PBD2  Rest per primary           Silviano Willett MD

## 2024-04-08 NOTE — SIGNIFICANT EVENT
Repeat imaging was reviewed and is stable.  No acute neurosurgical intervention or additional neuroimaging needed at this time. Prophylactic anticoagulation allowed on post-bleed day 2.  ASA restart post bleed day 14.  Ordered two week follow up with repeat CTH at that time.   Thank you for allowing us to participate in the care of this patient. Will sign off at this time. Please page with any questions or concerns.    Patient and imaging staffed with Dr. Langley who agrees with the above plan.

## 2024-04-09 LAB
ALBUMIN SERPL BCP-MCNC: 3.4 G/DL (ref 3.4–5)
ANION GAP SERPL CALC-SCNC: 11 MMOL/L (ref 10–20)
BUN SERPL-MCNC: 24 MG/DL (ref 6–23)
CALCIUM SERPL-MCNC: 8.5 MG/DL (ref 8.6–10.6)
CHLORIDE SERPL-SCNC: 103 MMOL/L (ref 98–107)
CO2 SERPL-SCNC: 27 MMOL/L (ref 21–32)
CREAT SERPL-MCNC: 0.77 MG/DL (ref 0.5–1.05)
EGFRCR SERPLBLD CKD-EPI 2021: 79 ML/MIN/1.73M*2
ERYTHROCYTE [DISTWIDTH] IN BLOOD BY AUTOMATED COUNT: 12.2 % (ref 11.5–14.5)
GLUCOSE SERPL-MCNC: 120 MG/DL (ref 74–99)
HCT VFR BLD AUTO: 36.1 % (ref 36–46)
HGB BLD-MCNC: 11.8 G/DL (ref 12–16)
MAGNESIUM SERPL-MCNC: 1.82 MG/DL (ref 1.6–2.4)
MCH RBC QN AUTO: 32.3 PG (ref 26–34)
MCHC RBC AUTO-ENTMCNC: 32.7 G/DL (ref 32–36)
MCV RBC AUTO: 99 FL (ref 80–100)
NRBC BLD-RTO: 0 /100 WBCS (ref 0–0)
PHOSPHATE SERPL-MCNC: 3.4 MG/DL (ref 2.5–4.9)
PLATELET # BLD AUTO: 262 X10*3/UL (ref 150–450)
POTASSIUM SERPL-SCNC: 4.1 MMOL/L (ref 3.5–5.3)
RBC # BLD AUTO: 3.65 X10*6/UL (ref 4–5.2)
SODIUM SERPL-SCNC: 137 MMOL/L (ref 136–145)
WBC # BLD AUTO: 12.8 X10*3/UL (ref 4.4–11.3)

## 2024-04-09 PROCEDURE — 2500000001 HC RX 250 WO HCPCS SELF ADMINISTERED DRUGS (ALT 637 FOR MEDICARE OP)

## 2024-04-09 PROCEDURE — 80069 RENAL FUNCTION PANEL: CPT

## 2024-04-09 PROCEDURE — 36415 COLL VENOUS BLD VENIPUNCTURE: CPT

## 2024-04-09 PROCEDURE — 2500000004 HC RX 250 GENERAL PHARMACY W/ HCPCS (ALT 636 FOR OP/ED)

## 2024-04-09 PROCEDURE — 85027 COMPLETE CBC AUTOMATED: CPT

## 2024-04-09 PROCEDURE — 99231 SBSQ HOSP IP/OBS SF/LOW 25: CPT | Performed by: SURGERY

## 2024-04-09 PROCEDURE — 1100000001 HC PRIVATE ROOM DAILY

## 2024-04-09 PROCEDURE — 2500000002 HC RX 250 W HCPCS SELF ADMINISTERED DRUGS (ALT 637 FOR MEDICARE OP, ALT 636 FOR OP/ED)

## 2024-04-09 PROCEDURE — 83735 ASSAY OF MAGNESIUM: CPT

## 2024-04-09 RX ORDER — ENOXAPARIN SODIUM 100 MG/ML
30 INJECTION SUBCUTANEOUS EVERY 12 HOURS
Status: DISCONTINUED | OUTPATIENT
Start: 2024-04-09 | End: 2024-04-10 | Stop reason: HOSPADM

## 2024-04-09 RX ORDER — MAGNESIUM SULFATE HEPTAHYDRATE 40 MG/ML
2 INJECTION, SOLUTION INTRAVENOUS ONCE
Status: COMPLETED | OUTPATIENT
Start: 2024-04-09 | End: 2024-04-09

## 2024-04-09 RX ADMIN — MAGNESIUM SULFATE HEPTAHYDRATE 2 G: 40 INJECTION, SOLUTION INTRAVENOUS at 14:06

## 2024-04-09 RX ADMIN — BACITRACIN: 500 OINTMENT TOPICAL at 20:01

## 2024-04-09 RX ADMIN — BACITRACIN: 500 OINTMENT TOPICAL at 14:07

## 2024-04-09 RX ADMIN — METOPROLOL TARTRATE 50 MG: 50 TABLET, FILM COATED ORAL at 08:01

## 2024-04-09 RX ADMIN — LEVETIRACETAM 500 MG: 500 TABLET, FILM COATED ORAL at 08:01

## 2024-04-09 RX ADMIN — METOPROLOL TARTRATE 50 MG: 50 TABLET, FILM COATED ORAL at 20:01

## 2024-04-09 RX ADMIN — PAROXETINE 20 MG: 20 TABLET, FILM COATED ORAL at 08:01

## 2024-04-09 RX ADMIN — OXYCODONE HYDROCHLORIDE 5 MG: 5 TABLET ORAL at 06:44

## 2024-04-09 RX ADMIN — ATORVASTATIN CALCIUM 80 MG: 80 TABLET, FILM COATED ORAL at 20:01

## 2024-04-09 RX ADMIN — ACETAMINOPHEN 650 MG: 325 TABLET ORAL at 08:02

## 2024-04-09 RX ADMIN — BACITRACIN: 500 OINTMENT TOPICAL at 08:02

## 2024-04-09 RX ADMIN — LEVETIRACETAM 500 MG: 500 TABLET, FILM COATED ORAL at 20:01

## 2024-04-09 RX ADMIN — ACETAMINOPHEN 650 MG: 325 TABLET ORAL at 20:01

## 2024-04-09 RX ADMIN — ENOXAPARIN SODIUM 30 MG: 100 INJECTION SUBCUTANEOUS at 14:06

## 2024-04-09 RX ADMIN — ACETAMINOPHEN 650 MG: 325 TABLET ORAL at 14:06

## 2024-04-09 RX ADMIN — LEVOTHYROXINE SODIUM 88 MCG: 0.09 TABLET ORAL at 08:01

## 2024-04-09 SDOH — SOCIAL STABILITY: SOCIAL INSECURITY: ABUSE: ADULT

## 2024-04-09 SDOH — SOCIAL STABILITY: SOCIAL INSECURITY: DO YOU FEEL UNSAFE GOING BACK TO THE PLACE WHERE YOU ARE LIVING?: NO

## 2024-04-09 SDOH — SOCIAL STABILITY: SOCIAL INSECURITY: DOES ANYONE TRY TO KEEP YOU FROM HAVING/CONTACTING OTHER FRIENDS OR DOING THINGS OUTSIDE YOUR HOME?: NO

## 2024-04-09 SDOH — SOCIAL STABILITY: SOCIAL INSECURITY: DO YOU FEEL ANYONE HAS EXPLOITED OR TAKEN ADVANTAGE OF YOU FINANCIALLY OR OF YOUR PERSONAL PROPERTY?: NO

## 2024-04-09 SDOH — SOCIAL STABILITY: SOCIAL INSECURITY: ARE YOU OR HAVE YOU BEEN THREATENED OR ABUSED PHYSICALLY, EMOTIONALLY, OR SEXUALLY BY ANYONE?: NO

## 2024-04-09 SDOH — SOCIAL STABILITY: SOCIAL INSECURITY: WERE YOU ABLE TO COMPLETE ALL THE BEHAVIORAL HEALTH SCREENINGS?: YES

## 2024-04-09 SDOH — SOCIAL STABILITY: SOCIAL INSECURITY: HAS ANYONE EVER THREATENED TO HURT YOUR FAMILY OR YOUR PETS?: NO

## 2024-04-09 SDOH — SOCIAL STABILITY: SOCIAL INSECURITY: HAVE YOU HAD THOUGHTS OF HARMING ANYONE ELSE?: NO

## 2024-04-09 SDOH — SOCIAL STABILITY: SOCIAL INSECURITY: ARE THERE ANY APPARENT SIGNS OF INJURIES/BEHAVIORS THAT COULD BE RELATED TO ABUSE/NEGLECT?: NO

## 2024-04-09 ASSESSMENT — COGNITIVE AND FUNCTIONAL STATUS - GENERAL
TURNING FROM BACK TO SIDE WHILE IN FLAT BAD: A LITTLE
CLIMB 3 TO 5 STEPS WITH RAILING: A LITTLE
DRESSING REGULAR LOWER BODY CLOTHING: A LITTLE
STANDING UP FROM CHAIR USING ARMS: A LITTLE
MOVING FROM LYING ON BACK TO SITTING ON SIDE OF FLAT BED WITH BEDRAILS: A LITTLE
MOBILITY SCORE: 18
PERSONAL GROOMING: A LITTLE
HELP NEEDED FOR BATHING: A LITTLE
WALKING IN HOSPITAL ROOM: A LITTLE
TOILETING: A LITTLE
MOVING TO AND FROM BED TO CHAIR: A LITTLE
DRESSING REGULAR LOWER BODY CLOTHING: A LITTLE
MOVING TO AND FROM BED TO CHAIR: A LITTLE
TOILETING: A LITTLE
MOVING FROM LYING ON BACK TO SITTING ON SIDE OF FLAT BED WITH BEDRAILS: A LITTLE
DAILY ACTIVITIY SCORE: 18
DAILY ACTIVITIY SCORE: 19
TURNING FROM BACK TO SIDE WHILE IN FLAT BAD: A LITTLE
DRESSING REGULAR UPPER BODY CLOTHING: A LITTLE
CLIMB 3 TO 5 STEPS WITH RAILING: A LOT
MOBILITY SCORE: 17
HELP NEEDED FOR BATHING: A LITTLE
PERSONAL GROOMING: A LITTLE
DRESSING REGULAR UPPER BODY CLOTHING: A LITTLE
EATING MEALS: A LITTLE
WALKING IN HOSPITAL ROOM: A LITTLE
STANDING UP FROM CHAIR USING ARMS: A LITTLE

## 2024-04-09 ASSESSMENT — PATIENT HEALTH QUESTIONNAIRE - PHQ9
1. LITTLE INTEREST OR PLEASURE IN DOING THINGS: NOT AT ALL
SUM OF ALL RESPONSES TO PHQ9 QUESTIONS 1 & 2: 0
2. FEELING DOWN, DEPRESSED OR HOPELESS: NOT AT ALL

## 2024-04-09 ASSESSMENT — LIFESTYLE VARIABLES
PRESCIPTION_ABUSE_PAST_12_MONTHS: NO
SKIP TO QUESTIONS 9-10: 1
HOW OFTEN DO YOU HAVE A DRINK CONTAINING ALCOHOL: NEVER
HOW OFTEN DO YOU HAVE 6 OR MORE DRINKS ON ONE OCCASION: NEVER
SUBSTANCE_ABUSE_PAST_12_MONTHS: NO
AUDIT-C TOTAL SCORE: 0
HOW MANY STANDARD DRINKS CONTAINING ALCOHOL DO YOU HAVE ON A TYPICAL DAY: PATIENT DOES NOT DRINK
AUDIT-C TOTAL SCORE: 0

## 2024-04-09 ASSESSMENT — ACTIVITIES OF DAILY LIVING (ADL): LACK_OF_TRANSPORTATION: NO

## 2024-04-09 ASSESSMENT — PAIN DESCRIPTION - LOCATION: LOCATION: FACE

## 2024-04-09 ASSESSMENT — PAIN SCALES - GENERAL
PAINLEVEL_OUTOF10: 8
PAINLEVEL_OUTOF10: 2
PAINLEVEL_OUTOF10: 2

## 2024-04-09 ASSESSMENT — PAIN - FUNCTIONAL ASSESSMENT: PAIN_FUNCTIONAL_ASSESSMENT: 0-10

## 2024-04-09 NOTE — PROGRESS NOTES
04/09/24 1241   Discharge Planning   Living Arrangements Alone   Support Systems Children;Family members   Assistance Needed Per patient's daughter patient independent prior to admission   Type of Residence Private residence   Number of Stairs to Enter Residence 3   Number of Stairs Within Residence 0   Do you have animals or pets at home? No   Who is requesting discharge planning? Provider   Does the patient need discharge transport arranged? No   Financial Resource Strain   How hard is it for you to pay for the very basics like food, housing, medical care, and heating? Not hard   Housing Stability   In the last 12 months, was there a time when you were not able to pay the mortgage or rent on time? N   In the last 12 months, how many places have you lived? 1   In the last 12 months, was there a time when you did not have a steady place to sleep or slept in a shelter (including now)? N   Transportation Needs   In the past 12 months, has lack of transportation kept you from medical appointments or from getting medications? no   In the past 12 months, has lack of transportation kept you from meetings, work, or from getting things needed for daily living? No       Transitional Care Coordinator Note: TCC spoke with patient's daughter (Stefany Moon [phone]) introduced self and role to complete assessment (see above) and discuss discharge planning. Stefany confirmed demographics:     Address: [address] [place], [place] [id number]   Alternate/Emergency Contact: Stefany Moon (daughter) [phone] and Cale Mcclain (daughter) [phone]  Patient Contact: [phone]  DME: [employer]   Homecare: None   Falls: 3 Falls   PCP: Ashwin Iyer Head  Pharmacy: [facility]   Insurance: [employer]     Patient lives alone in ranch style home. Patient independent prior to admission.  Per medical team (trauma) patient is medically ready. Patient evaluated by therapy rec low  intensity with 24/7 assist. TCC informed and educated Stefany on therapy recs as well as provided information for SNF. Stefany denied SNF states patient can live with her (Stefany) or daughter Cale. Per Stefany plan to discuss discharge plan with patient and family this evening and to confirm with discharge planning team tomorrow 4/10.       Jessica Cruz RN BSN   Transitional Care Coordinator

## 2024-04-09 NOTE — PROGRESS NOTES
.[facility]  TRAUMA SERVICE - PROGRESS NOTE    Patient Name: Marybeth Duggan  MRN: [id number]  Admit Date: 407  : 1945  AGE: 78 y.o.   GENDER: female  ==============================================================================  MECHANISM OF INJURY:   78y F GLF with headstrike, CT showed left subdural hematoma with 5 mm midline shift.  She additionally has a large hematoma over the left lateral scalp with an overlying abrasion and a right thumb abrasion and hematoma.       LOC (yes/no?): yes  Anticoagulant / Anti-platelet Rx? (for what dx?): ASA (CAD)  Referring Facility Name (N/A for scene EMR run): [facility]     INJURIES:   Left subdural hematoma 7mm with 5mm midline shift     OTHER MEDICAL PROBLEMS:  HTN  HLD  Hypothyroid  CAD (on ASA 81)  urinary incontinence      INCIDENTAL FINDINGS:  none     PROCEDURES:  N/A  ==============================================================================  TODAY'S ASSESSMENT AND PLAN OF CARE:  ## subdural hematoma with midline shift  -repeat CT head x2 stable  -Neurosurgery consulted:   -Prophylactic anticoagulation POD2, will start today   -ASA restart post bleed day 14  - two week follow up with repeat CT  - keppra ppx  - q4hr neuro checks  - pain control with tylenol, low dose oxy PRN    ##CAD on ASA  - continue home metoprolol 50mg BID  - continue home atorvastatin  - okay for ASA post bleed day 14    -regular diet  -scds for dvt prophyl  -daily Am labs, replete lytes prn    Discussed with attending, Dr. Smith.    Dispo: medically clear for discharge. PT rec'ed low intensity PT and 24/ assist. Pt lives alone but daughter reports pt can stay with her or her sister. Plan to be determined tomorrow AM.    Mirtha Zuñiga MD  General Surgery PGY1  Trauma Surgery Service  ==============================================================================  CHIEF COMPLAINT / OVERNIGHT EVENTS:   No acute overnight events. Patient  reports her headache is slowly improving, pain well controlled. Denies any other injuries. Tolerating regular diet without n/v. No other acute patient concerns.    MEDICAL HISTORY / ROS:  Admission history and ROS reviewed. Pertinent changes as follows:  none    PHYSICAL EXAM:  Heart Rate:  [61-67]   Temp:  [35.7 °C (96.3 °F)-37.1 °C (98.8 °F)]   Resp:  [18]   BP: (102-174)/(66-88)   SpO2:  [93 %-96 %]     Constitutional:       Appearance: Normal appearance.   HENT:      Head:      Comments: Left forehead hematoma and abrasion     Nose: Nose normal.      Mouth/Throat:      Mouth: Mucous membranes are moist.   Eyes:      Pupils: Pupils are equal, round, and reactive to light.   Cardiovascular:      Rate and Rhythm: Regular rhythm.   Pulmonary:      Breath sounds: Normal breath sounds.   Abdominal:      General: Abdomen is flat. There is no distension.      Palpations: Abdomen is soft.      Tenderness: There is no abdominal tenderness.   Musculoskeletal:         General: Swelling and tenderness present.      Cervical back: Normal range of motion.      Comments: Right thumb hematoma and abrasion limiting movement   Skin:     General: Skin is warm and dry.   Neurological:      Mental Status: She is alert and oriented to person, place, and time.   Psychiatric:         Mood and Affect: Mood normal.         Behavior: Behavior normal.     IMAGING SUMMARY:  (summary of new imaging findings, not a copy of dictation)  No new imaging    LABS:  Results from last 7 days   Lab Units 04/07/24  0211   WBC AUTO x10*3/uL 18.0*   HEMOGLOBIN g/dL 14.2   HEMATOCRIT % 43.1   PLATELETS AUTO x10*3/uL 341   NEUTROS PCT AUTO % 73.8   LYMPHS PCT AUTO % 16.6   MONOS PCT AUTO % 7.5   EOS PCT AUTO % 0.6     Results from last 7 days   Lab Units 04/07/24  0310   APTT seconds 23*   INR  0.9     Results from last 7 days   Lab Units 04/07/24  0211   SODIUM mmol/L 138   POTASSIUM mmol/L 4.6   CHLORIDE mmol/L 101   CO2 mmol/L 27   BUN mg/dL 37*    CREATININE mg/dL 1.00   CALCIUM mg/dL 9.1   GLUCOSE mg/dL 165*     I have reviewed all medications, laboratory results, and imaging pertinent for today's encounter.

## 2024-04-09 NOTE — CARE PLAN
The patient's goals for the shift include      The clinical goals for the shift include get some rest ab=nd continue drinking fluids    Over the shift, the patient did not make progress toward the following goals. Barriers to progression include language barrier. Recommendations to address these barriers include using .

## 2024-04-09 NOTE — CARE PLAN
The patient's goals for the shift include      The clinical goals for the shift include pt will remain free from falls throught shift

## 2024-04-10 ENCOUNTER — DOCUMENTATION (OUTPATIENT)
Dept: HOME HEALTH SERVICES | Facility: HOME HEALTH | Age: 79
End: 2024-04-10
Payer: MEDICARE

## 2024-04-10 ENCOUNTER — PHARMACY VISIT (OUTPATIENT)
Dept: PHARMACY | Facility: CLINIC | Age: 79
End: 2024-04-10
Payer: COMMERCIAL

## 2024-04-10 ENCOUNTER — HOME HEALTH ADMISSION (OUTPATIENT)
Dept: HOME HEALTH SERVICES | Facility: HOME HEALTH | Age: 79
End: 2024-04-10
Payer: MEDICARE

## 2024-04-10 VITALS
SYSTOLIC BLOOD PRESSURE: 121 MMHG | RESPIRATION RATE: 16 BRPM | OXYGEN SATURATION: 95 % | TEMPERATURE: 97.7 F | DIASTOLIC BLOOD PRESSURE: 68 MMHG | HEART RATE: 60 BPM

## 2024-04-10 PROCEDURE — 2500000001 HC RX 250 WO HCPCS SELF ADMINISTERED DRUGS (ALT 637 FOR MEDICARE OP)

## 2024-04-10 PROCEDURE — 2500000004 HC RX 250 GENERAL PHARMACY W/ HCPCS (ALT 636 FOR OP/ED)

## 2024-04-10 PROCEDURE — 2500000002 HC RX 250 W HCPCS SELF ADMINISTERED DRUGS (ALT 637 FOR MEDICARE OP, ALT 636 FOR OP/ED)

## 2024-04-10 PROCEDURE — RXMED WILLOW AMBULATORY MEDICATION CHARGE

## 2024-04-10 PROCEDURE — 99231 SBSQ HOSP IP/OBS SF/LOW 25: CPT | Performed by: HEALTH CARE PROVIDER

## 2024-04-10 RX ORDER — METOPROLOL TARTRATE 50 MG/1
50 TABLET ORAL 2 TIMES DAILY
Start: 2024-04-10 | End: 2024-04-10

## 2024-04-10 RX ORDER — METOPROLOL TARTRATE 50 MG/1
50 TABLET ORAL 2 TIMES DAILY
Qty: 30 TABLET | Refills: 0 | OUTPATIENT
Start: 2024-04-10

## 2024-04-10 RX ORDER — LEVETIRACETAM 500 MG/1
500 TABLET ORAL 2 TIMES DAILY
Start: 2024-04-10 | End: 2024-04-10

## 2024-04-10 RX ORDER — BACITRACIN 500 [USP'U]/G
OINTMENT TOPICAL 3 TIMES DAILY
Qty: 14 G | Refills: 0
Start: 2024-04-10 | End: 2024-04-10 | Stop reason: SDUPTHER

## 2024-04-10 RX ORDER — LEVETIRACETAM 500 MG/1
500 TABLET ORAL 2 TIMES DAILY
Qty: 7 TABLET | Refills: 0
Start: 2024-04-10 | End: 2024-04-10 | Stop reason: SDUPTHER

## 2024-04-10 RX ORDER — ACETAMINOPHEN 325 MG/1
650 TABLET ORAL EVERY 6 HOURS
Qty: 40 TABLET | Refills: 0
Start: 2024-04-10 | End: 2024-04-10 | Stop reason: SDUPTHER

## 2024-04-10 RX ORDER — ACETAMINOPHEN 325 MG/1
TABLET ORAL
Qty: 40 TABLET | Refills: 0 | OUTPATIENT
Start: 2024-04-10

## 2024-04-10 RX ORDER — LEVETIRACETAM 500 MG/1
500 TABLET ORAL 2 TIMES DAILY
Qty: 7 TABLET | Refills: 0 | OUTPATIENT
Start: 2024-04-10 | End: 2024-04-17 | Stop reason: HOSPADM

## 2024-04-10 RX ORDER — BACITRACIN 500 [USP'U]/G
OINTMENT TOPICAL 3 TIMES DAILY
Start: 2024-04-10 | End: 2024-04-10

## 2024-04-10 RX ORDER — ACETAMINOPHEN 325 MG/1
650 TABLET ORAL EVERY 6 HOURS
Start: 2024-04-10 | End: 2024-04-10

## 2024-04-10 RX ORDER — METOPROLOL TARTRATE 50 MG/1
50 TABLET ORAL 2 TIMES DAILY
Qty: 30 TABLET | Refills: 0
Start: 2024-04-10 | End: 2024-04-10 | Stop reason: SDUPTHER

## 2024-04-10 RX ORDER — BACITRACIN 500 [USP'U]/G
OINTMENT TOPICAL
Qty: 28.4 G | Refills: 0 | OUTPATIENT
Start: 2024-04-10 | End: 2024-05-29 | Stop reason: ALTCHOICE

## 2024-04-10 RX ADMIN — PAROXETINE 20 MG: 20 TABLET, FILM COATED ORAL at 08:30

## 2024-04-10 RX ADMIN — BACITRACIN: 500 OINTMENT TOPICAL at 08:31

## 2024-04-10 RX ADMIN — LEVOTHYROXINE SODIUM 88 MCG: 0.09 TABLET ORAL at 08:30

## 2024-04-10 RX ADMIN — ENOXAPARIN SODIUM 30 MG: 100 INJECTION SUBCUTANEOUS at 02:15

## 2024-04-10 RX ADMIN — METOPROLOL TARTRATE 50 MG: 50 TABLET, FILM COATED ORAL at 08:30

## 2024-04-10 RX ADMIN — LEVETIRACETAM 500 MG: 500 TABLET, FILM COATED ORAL at 08:30

## 2024-04-10 RX ADMIN — ACETAMINOPHEN 650 MG: 325 TABLET ORAL at 08:31

## 2024-04-10 ASSESSMENT — COGNITIVE AND FUNCTIONAL STATUS - GENERAL
WALKING IN HOSPITAL ROOM: A LITTLE
DRESSING REGULAR LOWER BODY CLOTHING: A LITTLE
MOBILITY SCORE: 18
CLIMB 3 TO 5 STEPS WITH RAILING: A LITTLE
TURNING FROM BACK TO SIDE WHILE IN FLAT BAD: A LITTLE
DRESSING REGULAR UPPER BODY CLOTHING: A LITTLE
DAILY ACTIVITIY SCORE: 21
HELP NEEDED FOR BATHING: A LITTLE
MOVING FROM LYING ON BACK TO SITTING ON SIDE OF FLAT BED WITH BEDRAILS: A LITTLE
MOVING TO AND FROM BED TO CHAIR: A LITTLE
STANDING UP FROM CHAIR USING ARMS: A LITTLE

## 2024-04-10 ASSESSMENT — PAIN SCALES - GENERAL: PAINLEVEL_OUTOF10: 0 - NO PAIN

## 2024-04-10 ASSESSMENT — PAIN - FUNCTIONAL ASSESSMENT: PAIN_FUNCTIONAL_ASSESSMENT: 0-10

## 2024-04-10 NOTE — CARE PLAN
The patient's goals for the shift include      The clinical goals for the shift include remain free and safe from falls and injury

## 2024-04-10 NOTE — HH CARE COORDINATION
Home Care received a Referral for Physical Therapy and Occupational Therapy. We have processed the referral for a Start of Care on 04/11-04/12.     If you have any questions or concerns, please feel free to contact us at [phone]. Follow the prompts, enter your five digit zip code, and you will be directed to your care team on EAST 1.

## 2024-04-10 NOTE — PROGRESS NOTES
Transitional Care Coordinator Note: Per medical team (trauma) patient is medically ready. Discharge dispo: Plan for patient to discharge home with  and 24/7 family assist. TCC received call from patient's daughter Stefany Moon (daughter) [phone] stating patient will live with daughter Cale Mcclain (daughter) [phone] at address [address] [id number]. TCC placed call to patient's daughter Cale at listed number to confirm Cale will be able to assist patient with 24/7 care in the home and agreeable to [facility]. Cale is agreeable to assist patient with 24/7 care and [facility] for therapy. TCC contacted [facility] intake East 1 tean to review for acceptance, insurance verification and SOC.  Patient's daughter to assist with transportation home, plan to come to bedside at 2pm. TCC provided trauma PA with patient's daughters number as they had questions.       UPDATE 4/10/2024 10:00am   TCC received update from [facility] patient accepted, insurance verified and SOC 4/11 vs 4/12.        Jessica Cruz RN BSN   Transitional Care Coordinator

## 2024-04-10 NOTE — HOSPITAL COURSE
78-year-old female with a PMHx including HTN, HLD, hypothyroidism, was brought to [facility] from [facility] for a trauma consult after she sustained a fall from 1 step backwards striking the left side of her head. She was found to have a SDH with 5 mm midline shift and a large hematoma over the left lateral scalp with an overlying abrasion. Pt was followed by neurosurgery and was found to be at neurologic baseline in the setting of the traumatic SDH. Pt was started on Keppra ppx and q4hr neuro checks were preformed to monitor. Neuro checks along with repeat CT head were stable. Pt has been working with PT/OT after clearance and doing well overall. Pt to follow up with neuro for repeat CT in 2 weeks. Pt is medically ready for discharge and plan is to discharge home with HC/ family assistance on 4/10.    At the time of discharge, patient's pain was controlled with oral analgesia, patient was urinating, having BMs, sleeping, and eating well. Based on PT/OT's recommendation, patient was discharged home with home health care with scripts and follow up appointments. Discharge plan was discussed with the patient and all of the patient's questions were answered.

## 2024-04-10 NOTE — DISCHARGE INSTRUCTIONS
Follow Ups:    Trauma: A trauma clinic follow up is not necessary regarding your recent hospital admission. Follow up with the trauma surgery clinic as needed/desired. To follow up, please call the trauma clinic at [phone] to schedule your appointment. Do not hesitate to call our outpatient nurse coordinator at [phone] with any questions/concerns. The nurse will get back to you within 48-72 hours. If you feel it is an emergency, please proceed to your nearest Emergency Room.    Neurosurgery: If you have not been contacted within 7 days of discharge from the hospital, please call [phone] to schedule your follow up appointment with a neurosurgeon within 2 weeks of discharge regarding your recent hospital admission.    Primary Care Provider: Please follow up with your PCP within 1-2 weeks after discharge regarding your recent hospital admission. If you do not have a primary care provider, you may also call the hospital main number at [phone] and ask for a referral.    Instructions:    Activity: Normal activity as tolerated until follow up with Neurosurgery.    Diet: Resume your previous home diet unless otherwise indicated.    Medications: You are to resume all your home medications as previously prescribed with the exception of Aspirin 81 mg and Metoprolol 100 mg. You may resume Aspirin on 04/21/2024 (14 days post head bleed). Additionally, you were given a new prescription for Metoprolol 50 mg every 12 hours (twice daily) for 15 days or until you see your primary care providers.    If you display any of the following signs/symptoms: increased confusion, altered mental status, new numbness or tingling, decreased sensation or movement, pain that is uncontrolled with pain medications, increased shortness of breath, chest pain/palpitations, or any other concerning signs/symptoms, please proceed to your nearest Emergency Department for further evaluation and management.    Please come to the hospital  emergency room if you are experiencing: worsening abdominal pain, increasing abdominal distention, fevers, inability to tolerate food and drink (vomit every time you eat), nausea/vomiting, loss of bowel function (unable to pass gas or have bowel movements). If you notice increased redness and tenderness around your surgical sites/wounds, or an increase in creamy/foul smelling drainage from your wounds please call the trauma clinic or return to the emergency room.

## 2024-04-10 NOTE — DISCHARGE SUMMARY
Discharge Diagnosis  Subdural hematoma (CMS/HCC)    Issues Requiring Follow-Up  Subdural hematoma with Neurosurgery     Test Results Pending At Discharge  Pending Labs       No current pending labs.            Hospital Course  78-year-old female with a PMHx including HTN, HLD, hypothyroidism, was brought to [facility] from [facility] [place], operated by [facility] for a trauma consult after she sustained a fall from 1 step backwards striking the left side of her head. She was found to have a SDH with 5 mm midline shift and a large hematoma over the left lateral scalp with an overlying abrasion. Pt was followed by neurosurgery and was found to be at neurologic baseline in the setting of the traumatic SDH. Pt was started on Keppra ppx and q4hr neuro checks were preformed to monitor. Neuro checks along with repeat CT head were stable. Pt has been working with PT/OT after clearance and doing well overall. Pt to follow up with neuro for repeat CT in 2 weeks. Pt is medically ready for discharge and plan is to discharge home with HC/ family assistance on 4/10.    At the time of discharge, patient's pain was controlled with oral analgesia, patient was urinating, having BMs, sleeping, and eating well. Based on PT/OT's recommendation, patient was discharged home with home health care with scripts and follow up appointments. Discharge plan was discussed with the patient and all of the patient's questions were answered.      Pertinent Physical Exam At Time of Discharge  Physical Exam  Vitals reviewed.   Constitutional:       General: She is not in acute distress.     Appearance: Normal appearance. She is not ill-appearing.      Comments: Patient sitting in chair comfortably    HENT:      Head:      Comments: Left forehead hematoma and abrasion with scattered ecchymosis across left side of face     Right Ear: External ear normal.      Left Ear: External ear normal.      Nose: Nose normal.      Mouth/Throat:      Pharynx: Oropharynx is clear.   Cardiovascular:       Rate and Rhythm: Normal rate and regular rhythm.      Pulses: Normal pulses.      Heart sounds: Normal heart sounds.   Pulmonary:      Effort: Pulmonary effort is normal. No respiratory distress.      Breath sounds: Normal breath sounds.   Abdominal:      General: There is no distension.      Palpations: Abdomen is soft.      Tenderness: There is no abdominal tenderness.   Musculoskeletal:         General: Swelling, tenderness and signs of injury present. Normal range of motion.      Cervical back: Normal range of motion. No tenderness.      Comments: Right thumb hematoma, abrasion and ecchymosis   Skin:     Findings: Bruising and lesion present.      Comments: Abrasion to left forehead. Scattered ecchymosis to left side of face/neck and right hand.   Neurological:      Mental Status: She is alert and oriented to person, place, and time.      GCS: GCS eye subscore is 4. GCS verbal subscore is 5. GCS motor subscore is 6.      Sensory: Sensation is intact.      Motor: Motor function is intact.   Psychiatric:         Attention and Perception: Attention normal.         Mood and Affect: Mood normal.         Behavior: Behavior normal.         Home Medications     Medication List      START taking these medications     acetaminophen 325 mg tablet; Commonly known as: Tylenol; Take 2 tablets   (650 mg) by mouth every 6 hours for 5 days.   bacitracin 500 unit/gram ointment; Apply topically 3 times a day.   levETIRAcetam 500 mg tablet; Commonly known as: Keppra; Take 1 tablet   (500 mg) by mouth 2 times a day.   metoprolol tartrate 50 mg tablet; Commonly known as: Lopressor; Take 1   tablet by mouth 2 times a day.     CHANGE how you take these medications     levothyroxine 88 mcg tablet; Commonly known as: Synthroid, Levoxyl; Take   1 tablet (88 mcg) by mouth once daily.; What changed: when to take this   PARoxetine 20 mg tablet; Commonly known as: Paxil; Take 1 tablet (20 mg)   by mouth once daily.; What changed: when to take  this     CONTINUE taking these medications     atorvastatin 80 mg tablet; Commonly known as: Lipitor; TAKE 1 TABLET BY   MOUTH AT  BEDTIME   cholecalciferol 25 MCG (1000 UT) tablet; Commonly known as: Vitamin D-3   losartan-hydrochlorothiazide 100-25 mg tablet; Commonly known as: Hyzaar     STOP taking these medications     aspirin 81 mg capsule   metoprolol succinate  mg 24 hr tablet; Commonly known as:   Toprol-XL       Outpatient Follow-Up  Future Appointments   Date Time Provider Department Center   4/26/2024  1:15 PM Sunday Goldsmith MD [id number] [facility]   9/25/2024  2:40 PM Ashwin Garay MD [id number] [facility]       Patient was discussed with attending, Dr. Smith    Total face to face time spent with patient/family of 20 minutes, with >50% of the time spent discussing plan of care/management, counseling/educating on disease processes, explaining results of diagnostic testing.    Sterling Vergara PA-C  Trauma, Critical Care, and Acute Care Surgery  [id number]

## 2024-04-10 NOTE — NURSING NOTE
Nurse discussed discharge paperwork with pt. And family at bedside. Family understood and had no further questions, comments, or concerns at this time. IV access was removed pt. Tolerated well no s/s of pain or discomfort.

## 2024-04-11 ENCOUNTER — PATIENT OUTREACH (OUTPATIENT)
Dept: CARE COORDINATION | Facility: CLINIC | Age: 79
End: 2024-04-11
Payer: MEDICARE

## 2024-04-11 NOTE — PROGRESS NOTES
Discharge Facility: [facility]  Discharge Diagnosis: Subdural hematoma  Admission Date: 4/7/2024  Discharge Date: 4/10/2024    PCP Appointment Date:  -Pt daughter Cale declines scheduling at this time; she is encouraged to call if needs change    Specialist Appointment Date:   -Neurosurgery; pt daughter Cale to call to schedule     Hospital Encounter and Summary: Linked     See discharge assessment below for further details    Engagement  Call Start Time: 0933 (4/11/2024  9:35 AM)    Medications  Medications reviewed with patient/caregiver?: Yes (4/11/2024  9:35 AM)  Is the patient having any side effects they believe may be caused by any medication additions or changes?: No (4/11/2024  9:35 AM)  Does the patient have all medications ordered at discharge?: Yes (4/11/2024  9:35 AM)  Care Management Interventions: No intervention needed (4/11/2024  9:35 AM)  Is the patient taking all medications as directed (includes completed medication regime)?: Yes (4/11/2024  9:35 AM)    Appointments  Does the patient have a primary care provider?: Yes (4/11/2024  9:35 AM)  Care Management Interventions: Advised patient to make appointment (4/11/2024  9:35 AM)  Has the patient kept scheduled appointments due by today?: Yes (4/11/2024  9:35 AM)  Care Management Interventions: Advised to schedule with specialist (4/11/2024  9:35 AM)    Self Management  What is the home health agency?: [facility] [phone] (4/11/2024  9:35 AM)  Has home health visited the patient within 72 hours of discharge?: Call prior to 72 hours (4/11/2024  9:35 AM)    Patient Teaching  Does the patient have access to their discharge instructions?: Yes (4/11/2024  9:35 AM)  Care Management Interventions: Reviewed instructions with patient (4/11/2024  9:35 AM)  What is the patient's perception of their health status since discharge?: Improving (4/11/2024  9:35 AM)  Is the patient/caregiver able to teach back the hierarchy of who to call/visit for  symptoms/problems? PCP, Specialist, Home Health nurse, Urgent Care, ED, 911: Yes (4/11/2024  9:35 AM)    Wrap Up  Wrap Up Additional Comments: Pt was admitted to [facility] 4/7-4/10/2024 after a fall resulting in subdural hematoma. Spoke with pt daughter Cale who reports pt is doing well since discharge. Pt discharged with [facility]. Cale denies any questions or issues with medication and reports pt has all of her prescriptions. Cale reports understanding of discharge instructions. Cale states she is going to call to schedule neurosurgery follow up appt and ct scan; she declines PCP follow up for pt at this time. Cale denies any questions, needs, or concerns. She is encouraged to call if questions or needs arise. (4/11/2024  9:35 AM)  Call End Time: 0939 (4/11/2024  9:35 AM)

## 2024-04-15 ENCOUNTER — APPOINTMENT (OUTPATIENT)
Dept: CARDIOLOGY | Facility: HOSPITAL | Age: 79
End: 2024-04-15
Payer: MEDICARE

## 2024-04-15 ENCOUNTER — APPOINTMENT (OUTPATIENT)
Dept: RADIOLOGY | Facility: HOSPITAL | Age: 79
End: 2024-04-15
Payer: MEDICARE

## 2024-04-15 ENCOUNTER — HOSPITAL ENCOUNTER (OUTPATIENT)
Facility: HOSPITAL | Age: 79
Setting detail: OBSERVATION
Discharge: ANOTHER HOSPITAL | End: 2024-04-17
Attending: EMERGENCY MEDICINE | Admitting: FAMILY MEDICINE
Payer: MEDICARE

## 2024-04-15 DIAGNOSIS — R47.1 DYSARTHRIA: ICD-10-CM

## 2024-04-15 DIAGNOSIS — G45.9 TIA (TRANSIENT ISCHEMIC ATTACK): Primary | ICD-10-CM

## 2024-04-15 DIAGNOSIS — R47.81 SLURRED SPEECH: ICD-10-CM

## 2024-04-15 LAB
ALBUMIN SERPL-MCNC: 3.7 G/DL (ref 3.5–5)
ALP BLD-CCNC: 121 U/L (ref 35–125)
ALT SERPL-CCNC: 17 U/L (ref 5–40)
ANION GAP SERPL CALC-SCNC: 13 MMOL/L
APTT PPP: 24.8 SECONDS (ref 22–32.5)
AST SERPL-CCNC: 18 U/L (ref 5–40)
BASOPHILS # BLD AUTO: 0.05 X10*3/UL (ref 0–0.1)
BASOPHILS NFR BLD AUTO: 0.5 %
BILIRUB SERPL-MCNC: 0.6 MG/DL (ref 0.1–1.2)
BUN SERPL-MCNC: 19 MG/DL (ref 8–25)
CALCIUM SERPL-MCNC: 9.2 MG/DL (ref 8.5–10.4)
CHLORIDE SERPL-SCNC: 100 MMOL/L (ref 97–107)
CO2 SERPL-SCNC: 24 MMOL/L (ref 24–31)
CREAT SERPL-MCNC: 0.8 MG/DL (ref 0.4–1.6)
EGFRCR SERPLBLD CKD-EPI 2021: 76 ML/MIN/1.73M*2
EOSINOPHIL # BLD AUTO: 0.2 X10*3/UL (ref 0–0.4)
EOSINOPHIL NFR BLD AUTO: 1.9 %
ERYTHROCYTE [DISTWIDTH] IN BLOOD BY AUTOMATED COUNT: 12.2 % (ref 11.5–14.5)
GLUCOSE BLD MANUAL STRIP-MCNC: 112 MG/DL (ref 74–99)
GLUCOSE SERPL-MCNC: 102 MG/DL (ref 65–99)
HCT VFR BLD AUTO: 37.8 % (ref 36–46)
HGB BLD-MCNC: 12.7 G/DL (ref 12–16)
IMM GRANULOCYTES # BLD AUTO: 0.03 X10*3/UL (ref 0–0.5)
IMM GRANULOCYTES NFR BLD AUTO: 0.3 % (ref 0–0.9)
INR PPP: 1 (ref 0.9–1.2)
LYMPHOCYTES # BLD AUTO: 2.55 X10*3/UL (ref 0.8–3)
LYMPHOCYTES NFR BLD AUTO: 24.1 %
MCH RBC QN AUTO: 32.9 PG (ref 26–34)
MCHC RBC AUTO-ENTMCNC: 33.6 G/DL (ref 32–36)
MCV RBC AUTO: 98 FL (ref 80–100)
MONOCYTES # BLD AUTO: 0.76 X10*3/UL (ref 0.05–0.8)
MONOCYTES NFR BLD AUTO: 7.2 %
NEUTROPHILS # BLD AUTO: 6.98 X10*3/UL (ref 1.6–5.5)
NEUTROPHILS NFR BLD AUTO: 66 %
NRBC BLD-RTO: 0 /100 WBCS (ref 0–0)
PLATELET # BLD AUTO: 309 X10*3/UL (ref 150–450)
POTASSIUM SERPL-SCNC: 4.3 MMOL/L (ref 3.4–5.1)
PROT SERPL-MCNC: 7.5 G/DL (ref 5.9–7.9)
PROTHROMBIN TIME: 10.1 SECONDS (ref 9.3–12.7)
RBC # BLD AUTO: 3.86 X10*6/UL (ref 4–5.2)
SODIUM SERPL-SCNC: 137 MMOL/L (ref 133–145)
TROPONIN T SERPL-MCNC: 16 NG/L
WBC # BLD AUTO: 10.6 X10*3/UL (ref 4.4–11.3)

## 2024-04-15 PROCEDURE — 93005 ELECTROCARDIOGRAM TRACING: CPT

## 2024-04-15 PROCEDURE — 85610 PROTHROMBIN TIME: CPT

## 2024-04-15 PROCEDURE — 80053 COMPREHEN METABOLIC PANEL: CPT

## 2024-04-15 PROCEDURE — 85730 THROMBOPLASTIN TIME PARTIAL: CPT

## 2024-04-15 PROCEDURE — 36415 COLL VENOUS BLD VENIPUNCTURE: CPT

## 2024-04-15 PROCEDURE — 70450 CT HEAD/BRAIN W/O DYE: CPT | Performed by: RADIOLOGY

## 2024-04-15 PROCEDURE — G0378 HOSPITAL OBSERVATION PER HR: HCPCS

## 2024-04-15 PROCEDURE — 99285 EMERGENCY DEPT VISIT HI MDM: CPT | Mod: 25

## 2024-04-15 PROCEDURE — 82947 ASSAY GLUCOSE BLOOD QUANT: CPT | Mod: 59

## 2024-04-15 PROCEDURE — 70450 CT HEAD/BRAIN W/O DYE: CPT

## 2024-04-15 PROCEDURE — 82947 ASSAY GLUCOSE BLOOD QUANT: CPT

## 2024-04-15 PROCEDURE — 85025 COMPLETE CBC W/AUTO DIFF WBC: CPT

## 2024-04-15 PROCEDURE — 84484 ASSAY OF TROPONIN QUANT: CPT

## 2024-04-15 RX ORDER — NAPROXEN SODIUM 220 MG/1
324 TABLET, FILM COATED ORAL ONCE
Status: DISCONTINUED | OUTPATIENT
Start: 2024-04-15 | End: 2024-04-16

## 2024-04-15 RX ORDER — ACETAMINOPHEN 325 MG/1
650 TABLET ORAL ONCE
Status: COMPLETED | OUTPATIENT
Start: 2024-04-15 | End: 2024-04-15

## 2024-04-15 RX ADMIN — ACETAMINOPHEN 650 MG: 325 TABLET ORAL at 22:11

## 2024-04-15 ASSESSMENT — LIFESTYLE VARIABLES
TOTAL SCORE: 0
EVER FELT BAD OR GUILTY ABOUT YOUR DRINKING: NO
HAVE PEOPLE ANNOYED YOU BY CRITICIZING YOUR DRINKING: NO
EVER HAD A DRINK FIRST THING IN THE MORNING TO STEADY YOUR NERVES TO GET RID OF A HANGOVER: NO
HAVE YOU EVER FELT YOU SHOULD CUT DOWN ON YOUR DRINKING: NO

## 2024-04-15 ASSESSMENT — PAIN - FUNCTIONAL ASSESSMENT
PAIN_FUNCTIONAL_ASSESSMENT: 0-10
PAIN_FUNCTIONAL_ASSESSMENT: 0-10

## 2024-04-15 ASSESSMENT — PAIN SCALES - GENERAL
PAINLEVEL_OUTOF10: 5 - MODERATE PAIN
PAINLEVEL_OUTOF10: 3

## 2024-04-15 ASSESSMENT — COLUMBIA-SUICIDE SEVERITY RATING SCALE - C-SSRS
2. HAVE YOU ACTUALLY HAD ANY THOUGHTS OF KILLING YOURSELF?: NO
1. IN THE PAST MONTH, HAVE YOU WISHED YOU WERE DEAD OR WISHED YOU COULD GO TO SLEEP AND NOT WAKE UP?: NO
6. HAVE YOU EVER DONE ANYTHING, STARTED TO DO ANYTHING, OR PREPARED TO DO ANYTHING TO END YOUR LIFE?: NO

## 2024-04-15 ASSESSMENT — PAIN DESCRIPTION - FREQUENCY: FREQUENCY: CONSTANT/CONTINUOUS

## 2024-04-15 ASSESSMENT — PAIN DESCRIPTION - LOCATION
LOCATION: HEAD
LOCATION: HEAD

## 2024-04-15 ASSESSMENT — PAIN DESCRIPTION - PAIN TYPE: TYPE: ACUTE PAIN

## 2024-04-15 ASSESSMENT — PAIN DESCRIPTION - DESCRIPTORS: DESCRIPTORS: HEADACHE

## 2024-04-15 ASSESSMENT — PAIN DESCRIPTION - PROGRESSION: CLINICAL_PROGRESSION: NOT CHANGED

## 2024-04-15 ASSESSMENT — PAIN DESCRIPTION - ONSET: ONSET: ONGOING

## 2024-04-16 ENCOUNTER — APPOINTMENT (OUTPATIENT)
Dept: RADIOLOGY | Facility: HOSPITAL | Age: 79
End: 2024-04-16
Payer: MEDICARE

## 2024-04-16 PROBLEM — S06.5XAA TRAUMATIC SUBDURAL HEMORRHAGE WITH LOSS OF CONSCIOUSNESS STATUS UNKNOWN, INITIAL ENCOUNTER (MULTI): Status: ACTIVE | Noted: 2024-04-07

## 2024-04-16 PROBLEM — R47.1 DYSARTHRIA: Status: ACTIVE | Noted: 2024-04-16

## 2024-04-16 PROBLEM — T14.90XA TRAUMATIC INJURY: Status: ACTIVE | Noted: 2024-04-16

## 2024-04-16 PROBLEM — M79.602 CHRONIC PAIN OF LEFT UPPER EXTREMITY: Status: ACTIVE | Noted: 2024-03-13

## 2024-04-16 LAB
ATRIAL RATE: 66 BPM
P AXIS: 42 DEGREES
P OFFSET: 203 MS
P ONSET: 145 MS
PR INTERVAL: 152 MS
Q ONSET: 221 MS
QRS COUNT: 10 BEATS
QRS DURATION: 76 MS
QT INTERVAL: 406 MS
QTC CALCULATION(BAZETT): 425 MS
QTC FREDERICIA: 419 MS
R AXIS: -10 DEGREES
T AXIS: 24 DEGREES
T OFFSET: 424 MS
VENTRICULAR RATE: 66 BPM

## 2024-04-16 PROCEDURE — G0378 HOSPITAL OBSERVATION PER HR: HCPCS

## 2024-04-16 PROCEDURE — 96374 THER/PROPH/DIAG INJ IV PUSH: CPT

## 2024-04-16 PROCEDURE — 2500000006 HC RX 250 W HCPCS SELF ADMINISTERED DRUGS (ALT 637 FOR ALL PAYERS): Performed by: FAMILY MEDICINE

## 2024-04-16 PROCEDURE — 70551 MRI BRAIN STEM W/O DYE: CPT | Performed by: RADIOLOGY

## 2024-04-16 PROCEDURE — 70551 MRI BRAIN STEM W/O DYE: CPT

## 2024-04-16 PROCEDURE — 2500000001 HC RX 250 WO HCPCS SELF ADMINISTERED DRUGS (ALT 637 FOR MEDICARE OP): Performed by: FAMILY MEDICINE

## 2024-04-16 RX ORDER — PAROXETINE HYDROCHLORIDE 20 MG/1
20 TABLET, FILM COATED ORAL DAILY
Status: DISCONTINUED | OUTPATIENT
Start: 2024-04-16 | End: 2024-04-17 | Stop reason: HOSPADM

## 2024-04-16 RX ORDER — ATORVASTATIN CALCIUM 80 MG/1
80 TABLET, FILM COATED ORAL NIGHTLY
Status: DISCONTINUED | OUTPATIENT
Start: 2024-04-16 | End: 2024-04-16

## 2024-04-16 RX ORDER — LEVOTHYROXINE SODIUM 88 UG/1
88 TABLET ORAL DAILY
Status: DISCONTINUED | OUTPATIENT
Start: 2024-04-16 | End: 2024-04-16

## 2024-04-16 RX ORDER — METOPROLOL TARTRATE 50 MG/1
50 TABLET ORAL 2 TIMES DAILY
Status: DISCONTINUED | OUTPATIENT
Start: 2024-04-16 | End: 2024-04-17 | Stop reason: HOSPADM

## 2024-04-16 RX ORDER — ASPIRIN 325 MG
325 TABLET, DELAYED RELEASE (ENTERIC COATED) ORAL DAILY
Status: DISCONTINUED | OUTPATIENT
Start: 2024-04-16 | End: 2024-04-17

## 2024-04-16 RX ORDER — PAROXETINE HYDROCHLORIDE 20 MG/1
20 TABLET, FILM COATED ORAL DAILY
Status: DISCONTINUED | OUTPATIENT
Start: 2024-04-16 | End: 2024-04-16

## 2024-04-16 RX ORDER — LEVOTHYROXINE SODIUM 88 UG/1
88 TABLET ORAL DAILY
Status: DISCONTINUED | OUTPATIENT
Start: 2024-04-16 | End: 2024-04-17 | Stop reason: HOSPADM

## 2024-04-16 RX ORDER — ATORVASTATIN CALCIUM 80 MG/1
80 TABLET, FILM COATED ORAL NIGHTLY
Status: DISCONTINUED | OUTPATIENT
Start: 2024-04-16 | End: 2024-04-17 | Stop reason: HOSPADM

## 2024-04-16 RX ORDER — METOPROLOL TARTRATE 50 MG/1
50 TABLET ORAL 2 TIMES DAILY
Status: DISCONTINUED | OUTPATIENT
Start: 2024-04-16 | End: 2024-04-16

## 2024-04-16 RX ORDER — ACETAMINOPHEN 325 MG/1
650 TABLET ORAL EVERY 6 HOURS PRN
Status: DISCONTINUED | OUTPATIENT
Start: 2024-04-16 | End: 2024-04-17 | Stop reason: HOSPADM

## 2024-04-16 RX ADMIN — PAROXETINE HYDROCHLORIDE 20 MG: 20 TABLET, FILM COATED ORAL at 16:31

## 2024-04-16 RX ADMIN — ACETAMINOPHEN 650 MG: 325 TABLET ORAL at 16:35

## 2024-04-16 RX ADMIN — ASPIRIN 325 MG: 325 TABLET, COATED ORAL at 16:34

## 2024-04-16 RX ADMIN — LEVOTHYROXINE SODIUM 88 MCG: 0.09 TABLET ORAL at 16:31

## 2024-04-16 RX ADMIN — ATORVASTATIN CALCIUM 80 MG: 80 TABLET, FILM COATED ORAL at 21:02

## 2024-04-16 RX ADMIN — METOPROLOL TARTRATE 50 MG: 50 TABLET, FILM COATED ORAL at 21:02

## 2024-04-16 SDOH — SOCIAL STABILITY: SOCIAL INSECURITY: ABUSE: ADULT

## 2024-04-16 SDOH — SOCIAL STABILITY: SOCIAL INSECURITY: HAVE YOU HAD THOUGHTS OF HARMING ANYONE ELSE?: NO

## 2024-04-16 SDOH — SOCIAL STABILITY: SOCIAL INSECURITY: DOES ANYONE TRY TO KEEP YOU FROM HAVING/CONTACTING OTHER FRIENDS OR DOING THINGS OUTSIDE YOUR HOME?: NO

## 2024-04-16 SDOH — SOCIAL STABILITY: SOCIAL INSECURITY: HAVE YOU HAD ANY THOUGHTS OF HARMING ANYONE ELSE?: NO

## 2024-04-16 SDOH — SOCIAL STABILITY: SOCIAL INSECURITY: DO YOU FEEL ANYONE HAS EXPLOITED OR TAKEN ADVANTAGE OF YOU FINANCIALLY OR OF YOUR PERSONAL PROPERTY?: NO

## 2024-04-16 SDOH — SOCIAL STABILITY: SOCIAL INSECURITY: ARE YOU OR HAVE YOU BEEN THREATENED OR ABUSED PHYSICALLY, EMOTIONALLY, OR SEXUALLY BY ANYONE?: NO

## 2024-04-16 SDOH — SOCIAL STABILITY: SOCIAL INSECURITY: HAS ANYONE EVER THREATENED TO HURT YOUR FAMILY OR YOUR PETS?: NO

## 2024-04-16 SDOH — SOCIAL STABILITY: SOCIAL INSECURITY: ARE THERE ANY APPARENT SIGNS OF INJURIES/BEHAVIORS THAT COULD BE RELATED TO ABUSE/NEGLECT?: NO

## 2024-04-16 SDOH — SOCIAL STABILITY: SOCIAL INSECURITY: DO YOU FEEL UNSAFE GOING BACK TO THE PLACE WHERE YOU ARE LIVING?: NO

## 2024-04-16 ASSESSMENT — PAIN - FUNCTIONAL ASSESSMENT
PAIN_FUNCTIONAL_ASSESSMENT: 0-10

## 2024-04-16 ASSESSMENT — PATIENT HEALTH QUESTIONNAIRE - PHQ9
1. LITTLE INTEREST OR PLEASURE IN DOING THINGS: NOT AT ALL
2. FEELING DOWN, DEPRESSED OR HOPELESS: NOT AT ALL
SUM OF ALL RESPONSES TO PHQ9 QUESTIONS 1 & 2: 0

## 2024-04-16 ASSESSMENT — LIFESTYLE VARIABLES
HOW OFTEN DO YOU HAVE 6 OR MORE DRINKS ON ONE OCCASION: NEVER
HOW OFTEN DO YOU HAVE A DRINK CONTAINING ALCOHOL: NEVER
HOW MANY STANDARD DRINKS CONTAINING ALCOHOL DO YOU HAVE ON A TYPICAL DAY: PATIENT DOES NOT DRINK
PRESCIPTION_ABUSE_PAST_12_MONTHS: NO
SUBSTANCE_ABUSE_PAST_12_MONTHS: NO
AUDIT-C TOTAL SCORE: 0
SKIP TO QUESTIONS 9-10: 1
AUDIT-C TOTAL SCORE: 0

## 2024-04-16 ASSESSMENT — ACTIVITIES OF DAILY LIVING (ADL)
TOILETING: INDEPENDENT
ADEQUATE_TO_COMPLETE_ADL: YES
GROOMING: INDEPENDENT
LACK_OF_TRANSPORTATION: NO
PATIENT'S MEMORY ADEQUATE TO SAFELY COMPLETE DAILY ACTIVITIES?: YES
JUDGMENT_ADEQUATE_SAFELY_COMPLETE_DAILY_ACTIVITIES: YES
WALKS IN HOME: INDEPENDENT
FEEDING YOURSELF: INDEPENDENT
BATHING: INDEPENDENT
HEARING - LEFT EAR: DIFFICULTY WITH NOISE
HEARING - RIGHT EAR: DIFFICULTY WITH NOISE
DRESSING YOURSELF: INDEPENDENT

## 2024-04-16 ASSESSMENT — COGNITIVE AND FUNCTIONAL STATUS - GENERAL
TOILETING: A LITTLE
STANDING UP FROM CHAIR USING ARMS: A LITTLE
DRESSING REGULAR UPPER BODY CLOTHING: A LITTLE
DAILY ACTIVITIY SCORE: 19
MOVING FROM LYING ON BACK TO SITTING ON SIDE OF FLAT BED WITH BEDRAILS: A LITTLE
TOILETING: A LITTLE
WALKING IN HOSPITAL ROOM: A LITTLE
DRESSING REGULAR LOWER BODY CLOTHING: A LITTLE
TURNING FROM BACK TO SIDE WHILE IN FLAT BAD: A LITTLE
TURNING FROM BACK TO SIDE WHILE IN FLAT BAD: A LITTLE
HELP NEEDED FOR BATHING: A LITTLE
PATIENT BASELINE BEDBOUND: NO
DRESSING REGULAR LOWER BODY CLOTHING: A LITTLE
MOBILITY SCORE: 17
PERSONAL GROOMING: A LITTLE
STANDING UP FROM CHAIR USING ARMS: A LITTLE
CLIMB 3 TO 5 STEPS WITH RAILING: A LITTLE
PERSONAL GROOMING: A LITTLE
CLIMB 3 TO 5 STEPS WITH RAILING: A LOT
MOVING TO AND FROM BED TO CHAIR: A LITTLE
MOBILITY SCORE: 18
DAILY ACTIVITIY SCORE: 19
MOVING TO AND FROM BED TO CHAIR: A LITTLE
MOVING FROM LYING ON BACK TO SITTING ON SIDE OF FLAT BED WITH BEDRAILS: A LITTLE
WALKING IN HOSPITAL ROOM: A LITTLE
HELP NEEDED FOR BATHING: A LITTLE
DRESSING REGULAR UPPER BODY CLOTHING: A LITTLE

## 2024-04-16 ASSESSMENT — PAIN SCALES - GENERAL
PAINLEVEL_OUTOF10: 5 - MODERATE PAIN
PAINLEVEL_OUTOF10: 0 - NO PAIN
PAINLEVEL_OUTOF10: 5 - MODERATE PAIN
PAINLEVEL_OUTOF10: 0 - NO PAIN

## 2024-04-16 ASSESSMENT — PAIN DESCRIPTION - ORIENTATION
ORIENTATION: LEFT
ORIENTATION: LEFT

## 2024-04-16 ASSESSMENT — PAIN DESCRIPTION - LOCATION
LOCATION: HEAD
LOCATION: HEAD

## 2024-04-16 ASSESSMENT — PAIN DESCRIPTION - DESCRIPTORS: DESCRIPTORS: ACHING

## 2024-04-16 NOTE — H&P
History Of Present Illness  Marybeth Duggan is a 78 y.o. female with past medical history significant for hypertension, dyslipidemia, anxiety, hypothyroidism, history of recent subdural hematoma hemorrhage, osteoarthritis presenting with sudden onset of slurred speech and dysarthria that was noted by her daughter at 6 PM when patient was talking on the phone.  She became incomprehensible.  She was seen in this facility on 4/7/2024 due to a fall and head injury.  Patient was found to have a subdural hematoma and therefore was life flighted downtown to [facility].  She was discharged 5 days ago and has been stable at home.  She has been complaining of intermittent headache that was new from her baseline.  Otherwise she denies any facial droop, muscle weakness, dizziness, double vision, numbness or tingling of upper and lower extremities     Past Medical History  She has a past medical history of Bilateral shoulder pain, Personal history of other diseases of the circulatory system (08/08/2013), Personal history of other mental and behavioral disorders (08/08/2013), Pure hypercholesterolemia, unspecified (09/19/2013), and Thyroid condition.    Surgical History  She has a past surgical history that includes Other surgical history (10/22/2019); Other surgical history (10/22/2019); and Gallbladder surgery (11/30/2016).     Social History  She reports that she has never smoked. She has never used smokeless tobacco. She reports current alcohol use. She reports that she does not use drugs.    Family History  No family history on file.     Allergies  Patient has no known allergies.    Review of Systems  Positive for slurred speech, no chest pain, shortness of breath, no dizziness, no double vision, no muscle weakness, no sensory deficit, no abdominal pain, no nausea, no vomiting, no diarrhea, no constipation, no fever, no chills, no dysuria.  Physical Exam  General-awake, alert, oriented x3 speaks Gratian  Lung-clear to  auscultation bilaterally  Heart-regular sinus rhythm and rhythm, S1 and S2 audible,no murmurs  Abdomen- soft, nontender, nondistended, good bowel sounds, no organomegaly, no mass, bowel sounds are present  Extremities-no clubbing, no cyanosis, no edema.  Wound-none applicable  Last Recorded Vitals  /77 (BP Location: Right arm, Patient Position: Lying)   Pulse 79   Temp 36.5 °C (97.7 °F) (Oral)   Resp 25   Wt 96.4 kg (212 lb 8.4 oz)   SpO2 97%     Relevant Results             Assessment/Plan   Dysarthria, rule out stroke-I will perform MRI of brain without contrast, carotid ultrasound, start aspirin 325 mg p.o. daily, consult neurology.  Obtain PT/OT evaluation  Hypertension-continue metoprolol 50 mg p.o. twice daily and monitor  Dyslipidemia-I will continue Lipitor 80 mg p.o. daily and check lipid profile  Hypothyroidism-I will continue levothyroxine 88 mcg p.o. daily and check TSH in the morning  Disposition-I will consult PT/OT.  Discussed with daughter in length           Billy Price MD

## 2024-04-16 NOTE — ED PROVIDER NOTES
HPI   Chief Complaint   Patient presents with    Unable to speak     Happened a couple times today. Last time was at 1930, pt was talking on the phone and then suddenly became unable to speak. Her words became incoherent mumbling. She has a headache and feels tired. No new falls. Pt fell last week, hit head and was hospitalized, released on Wednesday.       HPI  See my MDM                  Basilio Coma Scale Score: 15         NIH Stroke Scale: 0             Patient History   Past Medical History:   Diagnosis Date    Bilateral shoulder pain     Personal history of other diseases of the circulatory system 08/08/2013    History of hypertension    Personal history of other mental and behavioral disorders 08/08/2013    History of depression    Pure hypercholesterolemia, unspecified 09/19/2013    Low-density-lipoid-type (LDL) hyperlipoproteinemia    Thyroid condition      Past Surgical History:   Procedure Laterality Date    GALLBLADDER SURGERY  11/30/2016    Gallbladder Surgery    OTHER SURGICAL HISTORY  10/22/2019    Hip replacement    OTHER SURGICAL HISTORY  10/22/2019    Thyroidectomy total     No family history on file.  Social History     Tobacco Use    Smoking status: Never    Smokeless tobacco: Never   Vaping Use    Vaping status: Never Used   Substance Use Topics    Alcohol use: Yes     Comment: occasional    Drug use: Never       Physical Exam   ED Triage Vitals [04/15/24 2029]   Temperature Heart Rate Respirations BP   37 °C (98.6 °F) 68 18 143/79      Pulse Ox Temp Source Heart Rate Source Patient Position   97 % Tympanic -- Sitting      BP Location FiO2 (%)     Left arm --       Physical Exam  CONSTITUTIONAL: Vital signs reviewed as charted, well-developed and in no distress  Eyes: Extraocular muscles are intact. Pupils equal round and reactive to light. Conjunctiva are pink.    ENT: Mucous membranes are moist. Tongue in the midline. Pharynx was without erythema or exudates, uvula midline  LUNGS: Breath  sounds equal and clear to auscultation. Good air exchange, no wheezes rales or retractions, pulse oximetry is charted.  HEART: Regular rate and rhythm without murmur thrill or rub, strong tones, auscultation is normal.  ABDOMEN: Soft and nontender without guarding rebound rigidity or mass. Bowel sounds are present and normal in all quadrants. There is no palpable masses or aneurysms identified. No hepatosplenomegaly, normal abdominal exam.  Neuro: The patient is awake, alert and oriented ×3. Moving all 4 extremities and answering questions appropriately.   MUSCULOSKELETAL: The calves are nontender to palpation. Full gross active range of motion.   PSYCH: Awake alert oriented, normal mood and affect.  Skin:  Dry, normal color, warm to the touch, no rash present.      ED Course & MDM   ED Course as of 04/15/24 2208   Mon Apr 15, 2024   2052 Sinus rhythm with frequent atrial paced complexes.  Rate of 66.  Minimal voltage criteria for LVH may be normal variant.  Inferior infarct age undetermined.  No evidence of ischemia.  Similar to March 7, 2023 EKG [JN]      ED Course User Index  [JN] Deven Escobar MD         Diagnoses as of 04/15/24 2208   TIA (transient ischemic attack)       Medical Decision Making  History obtained from: patient    Vital signs, nursing notes, current medications, past medical history, Surgical history, allergies, social history, family History were reviewed.         HPI:  Patient 78-year-old female presenting emergency room today at around 730 tonight she had an episode where she could not speak any just came out of jumble.  This did last for between 2 and 5 minutes.  Was resolved prior to arrival.  History includes traumatic subdural hematoma about a week ago.  She was monitored at this time.  And was discharged home.  States she has been doing well has been having some headaches but otherwise no acute problems.  She denies chest pain, shortness of breath, abdominal pain extremity  "edema.  She is nontoxic well-appearing vital signs positive for hypertension but otherwise unremarkable.      10 point ROS was reviewed and negative except Noted above in HPI.  DDX: as listed above          MDM Summary/considerations:  EMERGENCY DEPARTMENT COURSE and DIFFERENTIAL DIAGNOSIS/MDM:        The patient presented with a chief complaint of word difficulty. The differential diagnosis associated with this patient's presentation includes CVA, TIA, subdural hematoma, electrolyte abnormality.       Vitals:    Vitals:    04/15/24 2029 04/15/24 2145   BP: 143/79 169/78   BP Location: Left arm    Patient Position: Sitting    Pulse: 68    Resp: 18    Temp: 37 °C (98.6 °F)    TempSrc: Tympanic    SpO2: 97% 97%   Weight: 95.8 kg (211 lb 1.6 oz)    Height: 1.6 m (5' 3\")        ED Course as of 04/15/24 2208   Mon Apr 15, 2024   2052 Sinus rhythm with frequent atrial paced complexes.  Rate of 66.  Minimal voltage criteria for LVH may be normal variant.  Inferior infarct age undetermined.  No evidence of ischemia.  Similar to March 7, 2023 EKG [JN]      ED Course User Index  [JN] Deven Escobar MD         Diagnoses as of 04/15/24 2208   TIA (transient ischemic attack)       History Limited by:    None    Independent history obtained from:    Family Member daughter      External records reviewed:    Inpatient Notes/Discharge Summary from hospital stay a week ago      Diagnostics interpreted by me:    EKG interpreted by my attending physician, Xray(s) of the chest, and CT Scan(s) of the head      Discussions with other clinicians:    Hospitalist/Admitting Team Marcus      Chronic conditions impacting care:    None      Social determinants of health affecting care:    None    Diagnostic tests considered but not performed: none    ED Medications managed:    Medications   acetaminophen (Tylenol) tablet 650 mg (has no administration in time range)   aspirin chewable tablet 324 mg (has no administration in time range) "         Prescription drugs considered:    None    Labs Reviewed   CBC WITH AUTO DIFFERENTIAL - Abnormal       Result Value    WBC 10.6      nRBC 0.0      RBC 3.86 (*)     Hemoglobin 12.7      Hematocrit 37.8      MCV 98      MCH 32.9      MCHC 33.6      RDW 12.2      Platelets 309      Neutrophils % 66.0      Immature Granulocytes %, Automated 0.3      Lymphocytes % 24.1      Monocytes % 7.2      Eosinophils % 1.9      Basophils % 0.5      Neutrophils Absolute 6.98 (*)     Immature Granulocytes Absolute, Automated 0.03      Lymphocytes Absolute 2.55      Monocytes Absolute 0.76      Eosinophils Absolute 0.20      Basophils Absolute 0.05     COMPREHENSIVE METABOLIC PANEL - Abnormal    Glucose 102 (*)     Sodium 137      Potassium 4.3      Chloride 100      Bicarbonate 24      Urea Nitrogen 19      Creatinine 0.80      eGFR 76      Calcium 9.2      Albumin 3.7      Alkaline Phosphatase 121      Total Protein 7.5      AST 18      Bilirubin, Total 0.6      ALT 17      Anion Gap 13     TROPONIN T, HIGH SENSITIVITY - Abnormal    Troponin T, High Sensitivity 16 (*)    POCT GLUCOSE - Abnormal    POCT Glucose 112 (*)    PROTIME-INR - Normal    Protime 10.1      INR 1.0      Narrative:     INR Therapeutic Range: 2.0-3.5   APTT - Normal    aPTT 24.8     POCT GLUCOSE METER     CT head wo IV contrast   Final Result   Grossly stable size of left subdural hematoma which measures   approximately 7 mm in thickness. There is stable mass effect with   approximately 4 mm left to right midline shift.        Large extracranial scalp soft tissue hematoma is again noted.        MACRO:   None        Signed by: Saad Calle 4/15/2024 9:25 PM   Dictation workstation:   [id number]        Medications   acetaminophen (Tylenol) tablet 650 mg (has no administration in time range)   aspirin chewable tablet 324 mg (has no administration in time range)     New Prescriptions    No medications on file       I spoke with Dr. Velazquez. We thoroughly  discussed the history, physical exam, laboratory and imaging studies, as well as, emergency department course. Based upon that discussion, we've decided to admit for further observation and evaluation of CVA-like symptoms.  As I have deemed necessary from their history, physical and studies, I have considered and evaluated for the following diagnoses: CAVERNOUS VENOUS THROMBOSIS, DIABETES, INTRACRANIAL HEMORRHAGE, MENINGITIS, SUBARACHNOID HEMORRHAGE, SUBDURAL HEMATOMA, & STROKE.     Patient admitted for further evaluation and care.Was given dose of aspirin.  CT scan was grossly unremarkable.    After reviewing patient's comorbidities, severity of history of presenting illness, labs and imaging if obtained in conjunction with physical exam and course in emergency department, deemed to have potential for deterioration/progression of symptoms that could lead to multiple morbidities or mortality, decision made that patient requires further observation/evaluation/treatment and patient admitted to appropriate service, patient/family understand and agree with plan.          Critical Care: Not warranted at this time                  This chart was completed using voice recognition transcription software. Please excuse any errors of transcription including grammatical, punctuation, syntax and spelling errors.  Please contact me with any questions regarding this chart.    Procedure  Procedures     DEANDRE Johnson-CNP  04/15/24 9930

## 2024-04-16 NOTE — PROGRESS NOTES
Pharmacy Medication History Review    Marybeth Duggan is a 78 y.o. female admitted for TIA (transient ischemic attack). Pharmacy reviewed the patient's [id number] medications and allergies for accuracy.    Medications ADDED:  none  Medications CHANGED:  none  Medications REMOVED:   Losartan- hydrochlorothiazide - historical removed  Levetiracetam 500mg - therapy complete      The list below reflects the updated PTA list. Comments regarding how patient may be taking medications differently can be found in the Admit Orders Activity  Prior to Admission Medications   Prescriptions Last Dose Informant   PARoxetine (Paxil) 20 mg tablet  Child   Sig: Take 1 tablet (20 mg) by mouth once daily.   Patient taking differently: Take 1 tablet (20 mg) by mouth once daily in the morning.   acetaminophen (Tylenol) 325 mg tablet  Child   Sig: Take 2 by mouth every 6 hours for 5 days.   atorvastatin (Lipitor) 80 mg tablet  child   Sig: TAKE 1 TABLET BY MOUTH AT  BEDTIME   bacitracin 500 unit/gram ointment  child   Sig: Apply topically 3 times a day   cholecalciferol (Vitamin D-3) 25 MCG (1000 UT) tablet  Child   Sig: Take 1 tablet (25 mcg) by mouth once daily in the morning.   levothyroxine (Synthroid, Levoxyl) 88 mcg tablet  Child   Sig: Take 1 tablet (88 mcg) by mouth once daily.   Patient taking differently: Take 1 tablet (88 mcg) by mouth once daily in the morning. Take before meals.   losartan-hydrochlorothiazide (Hyzaar) 100-25 mg tablet  Child   Sig: Take 1 tablet by mouth once daily in the morning.   metoprolol tartrate (Lopressor) 50 mg tablet  child   Sig: Take 1 tablet by mouth 2 times a day.      Facility-Administered Medications: None        The list below reflects the updated allergy list. Please review each documented allergy for additional clarification and justification.  Allergies  Reviewed by Zabrina Ramachandran on 4/16/2024   No Known Allergies         Pharmacy has been updated to [facility].    Sources used to  complete the med history include dispense history, PTA medication list, patient/daughter interview.    Below are additional concerns with the patient's PTA list.  Patient started on metoprolol tartrate 50mg BID  for 15 days. Then instructed to increase to 100mg BID per daughter. Please order admission medications.    Zabrina Ramachandran  Please reach out via [employer] Secure Chat for questions

## 2024-04-16 NOTE — NURSING NOTE
Assumed care of patient. Patient arrived from ED via cart. A/ox3. Patient speaks little english but can write and read it. Daughter at bedside. No edema noted. Bruising on left side of head hematoma. Bruising on left face, left hip and right hand. Abdomen soft and non distended. Bowel sounds activex4. Lungs CTA. Patient in bed resting, bed alarm on, call light in reach.

## 2024-04-16 NOTE — ED TRIAGE NOTES
" TRIAGE NOTE   I saw the patient as the Clinician in Triage and performed a brief history and physical exam, established acuity, and ordered appropriate tests to develop basic plan of care. Patient will be seen by an TAMIKA, resident and/or physician who will independently evaluate the patient. Please see subsequent provider notes for further details and disposition.     Brief HPI: In brief, Marybeth Duggan is a 78 y.o. female that presents for abnormal speaking.  Patient was reportedly seen in the emergency department on 4/7/2024 due to a fall and head injury.  Patient was found to have a subdural hematoma and therefore was life flighted downtown to [facility].  Patient was discharged 5 days ago and has been stable at home.  Daughter is at bedside helping to provide history.  Daughter said that she feels that the patient has been \"spaced out\" since returning home however has been otherwise stable.  She states today the patient was on the phone speaking to family when suddenly her words became incomprehensible. Due to patient's recent head injury daughter was concerned.  Patient denies any new head injury or trauma.  She states she feels okay now and just has a headache however daughter states that she always has a headache.  She denies chest pain, shortness of breath, fever, chills, nausea, vomiting abdominal pain, numbness, tingling.    Focused Physical exam:   GENERAL APPEARANCE: Awake and alert.    VITAL SIGNS: As per the nurses' triage record.    HEENT: Hematoma noted to the left forehead as well as old bruising noted to the left side of the face.    NECK: Soft, Nontender, full gross ROM, no meningeal signs.     CHEST: Nontender to palpation. Clear to auscultation bilaterally. No rales, rhonchi, or wheezing.    HEART: S1, S2. Regular rate and rhythm. No murmurs, gallops or rubs.  Strong and equal pulses in the extremities.    ABDOMEN: Soft, nontender    MUSCULOSKELETAL: Moving all extremities    NEUROLOGICAL: Awake, " alert and oriented x 3. Power intact in the upper and lower extremities. Sensation is intact to light touch in the upper and lower extremities.    DERM: Warm and dry.  No rashes.    Plan/MDM:   Patient is a 78-year-old female presenting to the emergency department for evaluation of abnormal speech.  On physical exam vital signs remarkable for systolic hypertension but otherwise stable patient is in no acute distress.  Symptoms are resolved at this time and patient is not a TNK candidate due to recent head bleed therefore code brain attack not called at this time.  Brain attack workup placed.    Please see subsequent provider note for further details and disposition

## 2024-04-17 ENCOUNTER — APPOINTMENT (OUTPATIENT)
Dept: CARDIOLOGY | Facility: HOSPITAL | Age: 79
End: 2024-04-17
Payer: MEDICARE

## 2024-04-17 ENCOUNTER — HOSPITAL ENCOUNTER (INPATIENT)
Facility: HOSPITAL | Age: 79
LOS: 5 days | Discharge: HOME | DRG: 101 | End: 2024-04-22
Attending: SURGERY | Admitting: SURGERY
Payer: MEDICARE

## 2024-04-17 ENCOUNTER — APPOINTMENT (OUTPATIENT)
Dept: RADIOLOGY | Facility: HOSPITAL | Age: 79
End: 2024-04-17
Payer: MEDICARE

## 2024-04-17 ENCOUNTER — HOME CARE VISIT (OUTPATIENT)
Dept: HOME HEALTH SERVICES | Facility: HOME HEALTH | Age: 79
End: 2024-04-17

## 2024-04-17 VITALS
TEMPERATURE: 98.6 F | BODY MASS INDEX: 37.93 KG/M2 | OXYGEN SATURATION: 96 % | RESPIRATION RATE: 18 BRPM | SYSTOLIC BLOOD PRESSURE: 125 MMHG | WEIGHT: 214.07 LBS | DIASTOLIC BLOOD PRESSURE: 84 MMHG | HEART RATE: 69 BPM | HEIGHT: 63 IN

## 2024-04-17 DIAGNOSIS — R41.82 FLUCTUATING MENTAL STATUS: Primary | ICD-10-CM

## 2024-04-17 LAB
ANION GAP SERPL CALC-SCNC: 11 MMOL/L
BUN SERPL-MCNC: 17 MG/DL (ref 8–25)
CALCIUM SERPL-MCNC: 8.8 MG/DL (ref 8.5–10.4)
CHLORIDE SERPL-SCNC: 102 MMOL/L (ref 97–107)
CHOLEST SERPL-MCNC: 133 MG/DL (ref 133–200)
CHOLEST/HDLC SERPL: 3.1 {RATIO}
CO2 SERPL-SCNC: 25 MMOL/L (ref 24–31)
CREAT SERPL-MCNC: 0.7 MG/DL (ref 0.4–1.6)
EGFRCR SERPLBLD CKD-EPI 2021: 89 ML/MIN/1.73M*2
ERYTHROCYTE [DISTWIDTH] IN BLOOD BY AUTOMATED COUNT: 12.2 % (ref 11.5–14.5)
GLUCOSE BLD MANUAL STRIP-MCNC: 152 MG/DL (ref 74–99)
GLUCOSE SERPL-MCNC: 116 MG/DL (ref 65–99)
HCT VFR BLD AUTO: 36.2 % (ref 36–46)
HDLC SERPL-MCNC: 43 MG/DL
HGB BLD-MCNC: 12 G/DL (ref 12–16)
LDLC SERPL CALC-MCNC: 68 MG/DL (ref 65–130)
MCH RBC QN AUTO: 32.1 PG (ref 26–34)
MCHC RBC AUTO-ENTMCNC: 33.1 G/DL (ref 32–36)
MCV RBC AUTO: 97 FL (ref 80–100)
NRBC BLD-RTO: 0 /100 WBCS (ref 0–0)
PLATELET # BLD AUTO: 312 X10*3/UL (ref 150–450)
POTASSIUM SERPL-SCNC: 4.1 MMOL/L (ref 3.4–5.1)
RBC # BLD AUTO: 3.74 X10*6/UL (ref 4–5.2)
SODIUM SERPL-SCNC: 138 MMOL/L (ref 133–145)
TRIGL SERPL-MCNC: 108 MG/DL (ref 40–150)
TSH SERPL DL<=0.05 MIU/L-ACNC: 3.89 MIU/L (ref 0.27–4.2)
WBC # BLD AUTO: 9 X10*3/UL (ref 4.4–11.3)

## 2024-04-17 PROCEDURE — 70450 CT HEAD/BRAIN W/O DYE: CPT

## 2024-04-17 PROCEDURE — 85027 COMPLETE CBC AUTOMATED: CPT | Performed by: FAMILY MEDICINE

## 2024-04-17 PROCEDURE — 2500000004 HC RX 250 GENERAL PHARMACY W/ HCPCS (ALT 636 FOR OP/ED): Performed by: PSYCHIATRY & NEUROLOGY

## 2024-04-17 PROCEDURE — 93880 EXTRACRANIAL BILAT STUDY: CPT

## 2024-04-17 PROCEDURE — 80061 LIPID PANEL: CPT | Performed by: FAMILY MEDICINE

## 2024-04-17 PROCEDURE — 2500000001 HC RX 250 WO HCPCS SELF ADMINISTERED DRUGS (ALT 637 FOR MEDICARE OP): Performed by: FAMILY MEDICINE

## 2024-04-17 PROCEDURE — 2500000006 HC RX 250 W HCPCS SELF ADMINISTERED DRUGS (ALT 637 FOR ALL PAYERS): Performed by: FAMILY MEDICINE

## 2024-04-17 PROCEDURE — 36415 COLL VENOUS BLD VENIPUNCTURE: CPT | Performed by: FAMILY MEDICINE

## 2024-04-17 PROCEDURE — 82947 ASSAY GLUCOSE BLOOD QUANT: CPT

## 2024-04-17 PROCEDURE — 2020000001 HC ICU ROOM DAILY

## 2024-04-17 PROCEDURE — 2500000004 HC RX 250 GENERAL PHARMACY W/ HCPCS (ALT 636 FOR OP/ED)

## 2024-04-17 PROCEDURE — 80048 BASIC METABOLIC PNL TOTAL CA: CPT | Performed by: FAMILY MEDICINE

## 2024-04-17 PROCEDURE — 70450 CT HEAD/BRAIN W/O DYE: CPT | Performed by: RADIOLOGY

## 2024-04-17 PROCEDURE — 84443 ASSAY THYROID STIM HORMONE: CPT | Performed by: FAMILY MEDICINE

## 2024-04-17 PROCEDURE — 96374 THER/PROPH/DIAG INJ IV PUSH: CPT

## 2024-04-17 PROCEDURE — 93880 EXTRACRANIAL BILAT STUDY: CPT | Performed by: INTERNAL MEDICINE

## 2024-04-17 PROCEDURE — G0378 HOSPITAL OBSERVATION PER HR: HCPCS

## 2024-04-17 RX ORDER — LEVETIRACETAM 10 MG/ML
1000 INJECTION INTRAVASCULAR ONCE
Status: COMPLETED | OUTPATIENT
Start: 2024-04-17 | End: 2024-04-17

## 2024-04-17 RX ORDER — LEVETIRACETAM 5 MG/ML
500 INJECTION INTRAVASCULAR EVERY 12 HOURS
Status: DISCONTINUED | OUTPATIENT
Start: 2024-04-18 | End: 2024-04-17 | Stop reason: HOSPADM

## 2024-04-17 RX ORDER — SODIUM CHLORIDE 9 MG/ML
100 INJECTION, SOLUTION INTRAVENOUS CONTINUOUS
Status: DISCONTINUED | OUTPATIENT
Start: 2024-04-17 | End: 2024-04-17 | Stop reason: HOSPADM

## 2024-04-17 RX ADMIN — ASPIRIN 325 MG: 325 TABLET, COATED ORAL at 09:08

## 2024-04-17 RX ADMIN — PAROXETINE HYDROCHLORIDE 20 MG: 20 TABLET, FILM COATED ORAL at 09:09

## 2024-04-17 RX ADMIN — ACETAMINOPHEN 650 MG: 325 TABLET ORAL at 20:12

## 2024-04-17 RX ADMIN — METOPROLOL TARTRATE 50 MG: 50 TABLET, FILM COATED ORAL at 09:08

## 2024-04-17 RX ADMIN — ACETAMINOPHEN 650 MG: 325 TABLET ORAL at 13:20

## 2024-04-17 RX ADMIN — SODIUM CHLORIDE 100 ML/HR: 900 INJECTION, SOLUTION INTRAVENOUS at 21:49

## 2024-04-17 RX ADMIN — LEVOTHYROXINE SODIUM 88 MCG: 0.09 TABLET ORAL at 09:08

## 2024-04-17 RX ADMIN — METOPROLOL TARTRATE 50 MG: 50 TABLET, FILM COATED ORAL at 20:13

## 2024-04-17 RX ADMIN — ATORVASTATIN CALCIUM 80 MG: 80 TABLET, FILM COATED ORAL at 20:13

## 2024-04-17 RX ADMIN — LEVETIRACETAM 1000 MG: 10 INJECTION INTRAVENOUS at 17:04

## 2024-04-17 SDOH — SOCIAL STABILITY: SOCIAL NETWORK: ARE YOU MARRIED, WIDOWED, DIVORCED, SEPARATED, NEVER MARRIED, OR LIVING WITH A PARTNER?: WIDOWED

## 2024-04-17 SDOH — ECONOMIC STABILITY: FOOD INSECURITY: WITHIN THE PAST 12 MONTHS, YOU WORRIED THAT YOUR FOOD WOULD RUN OUT BEFORE YOU GOT MONEY TO BUY MORE.: NEVER TRUE

## 2024-04-17 SDOH — SOCIAL STABILITY: SOCIAL NETWORK: HOW OFTEN DO YOU GET TOGETHER WITH FRIENDS OR RELATIVES?: MORE THAN THREE TIMES A WEEK

## 2024-04-17 SDOH — HEALTH STABILITY: MENTAL HEALTH: HOW OFTEN DO YOU HAVE 6 OR MORE DRINKS ON ONE OCCASION?: NEVER

## 2024-04-17 SDOH — ECONOMIC STABILITY: HOUSING INSECURITY
IN THE LAST 12 MONTHS, WAS THERE A TIME WHEN YOU DID NOT HAVE A STEADY PLACE TO SLEEP OR SLEPT IN A SHELTER (INCLUDING NOW)?: NO

## 2024-04-17 SDOH — ECONOMIC STABILITY: TRANSPORTATION INSECURITY
IN THE PAST 12 MONTHS, HAS LACK OF TRANSPORTATION KEPT YOU FROM MEETINGS, WORK, OR FROM GETTING THINGS NEEDED FOR DAILY LIVING?: NO

## 2024-04-17 SDOH — HEALTH STABILITY: PHYSICAL HEALTH: ON AVERAGE, HOW MANY MINUTES DO YOU ENGAGE IN EXERCISE AT THIS LEVEL?: 0 MIN

## 2024-04-17 SDOH — ECONOMIC STABILITY: FOOD INSECURITY: WITHIN THE PAST 12 MONTHS, THE FOOD YOU BOUGHT JUST DIDN'T LAST AND YOU DIDN'T HAVE MONEY TO GET MORE.: NEVER TRUE

## 2024-04-17 SDOH — ECONOMIC STABILITY: INCOME INSECURITY: IN THE PAST 12 MONTHS, HAS THE ELECTRIC, GAS, OIL, OR WATER COMPANY THREATENED TO SHUT OFF SERVICE IN YOUR HOME?: NO

## 2024-04-17 SDOH — HEALTH STABILITY: MENTAL HEALTH: HOW MANY STANDARD DRINKS CONTAINING ALCOHOL DO YOU HAVE ON A TYPICAL DAY?: PATIENT DOES NOT DRINK

## 2024-04-17 SDOH — SOCIAL STABILITY: SOCIAL INSECURITY
WITHIN THE LAST YEAR, HAVE TO BEEN RAPED OR FORCED TO HAVE ANY KIND OF SEXUAL ACTIVITY BY YOUR PARTNER OR EX-PARTNER?: NO

## 2024-04-17 SDOH — ECONOMIC STABILITY: INCOME INSECURITY: IN THE LAST 12 MONTHS, WAS THERE A TIME WHEN YOU WERE NOT ABLE TO PAY THE MORTGAGE OR RENT ON TIME?: NO

## 2024-04-17 SDOH — SOCIAL STABILITY: SOCIAL INSECURITY
WITHIN THE LAST YEAR, HAVE YOU BEEN KICKED, HIT, SLAPPED, OR OTHERWISE PHYSICALLY HURT BY YOUR PARTNER OR EX-PARTNER?: NO

## 2024-04-17 SDOH — SOCIAL STABILITY: SOCIAL INSECURITY: WITHIN THE LAST YEAR, HAVE YOU BEEN AFRAID OF YOUR PARTNER OR EX-PARTNER?: NO

## 2024-04-17 SDOH — HEALTH STABILITY: MENTAL HEALTH: HOW OFTEN DO YOU HAVE A DRINK CONTAINING ALCOHOL?: NEVER

## 2024-04-17 SDOH — ECONOMIC STABILITY: TRANSPORTATION INSECURITY
IN THE PAST 12 MONTHS, HAS THE LACK OF TRANSPORTATION KEPT YOU FROM MEDICAL APPOINTMENTS OR FROM GETTING MEDICATIONS?: NO

## 2024-04-17 SDOH — ECONOMIC STABILITY: INCOME INSECURITY: HOW HARD IS IT FOR YOU TO PAY FOR THE VERY BASICS LIKE FOOD, HOUSING, MEDICAL CARE, AND HEATING?: NOT HARD AT ALL

## 2024-04-17 SDOH — SOCIAL STABILITY: SOCIAL NETWORK
IN A TYPICAL WEEK, HOW MANY TIMES DO YOU TALK ON THE PHONE WITH FAMILY, FRIENDS, OR NEIGHBORS?: MORE THAN THREE TIMES A WEEK

## 2024-04-17 SDOH — SOCIAL STABILITY: SOCIAL INSECURITY: WITHIN THE LAST YEAR, HAVE YOU BEEN HUMILIATED OR EMOTIONALLY ABUSED IN OTHER WAYS BY YOUR PARTNER OR EX-PARTNER?: NO

## 2024-04-17 SDOH — HEALTH STABILITY: PHYSICAL HEALTH: ON AVERAGE, HOW MANY DAYS PER WEEK DO YOU ENGAGE IN MODERATE TO STRENUOUS EXERCISE (LIKE A BRISK WALK)?: 0 DAYS

## 2024-04-17 SDOH — ECONOMIC STABILITY: HOUSING INSECURITY: IN THE LAST 12 MONTHS, HOW MANY PLACES HAVE YOU LIVED?: 1

## 2024-04-17 ASSESSMENT — PAIN SCALES - GENERAL
PAINLEVEL_OUTOF10: 5 - MODERATE PAIN
PAINLEVEL_OUTOF10: 0 - NO PAIN
PAINLEVEL_OUTOF10: 6
PAINLEVEL_OUTOF10: 0 - NO PAIN
PAINLEVEL_OUTOF10: 0 - NO PAIN

## 2024-04-17 ASSESSMENT — PAIN - FUNCTIONAL ASSESSMENT
PAIN_FUNCTIONAL_ASSESSMENT: 0-10

## 2024-04-17 ASSESSMENT — COGNITIVE AND FUNCTIONAL STATUS - GENERAL
PERSONAL GROOMING: A LITTLE
CLIMB 3 TO 5 STEPS WITH RAILING: A LOT
MOBILITY SCORE: 17
MOVING TO AND FROM BED TO CHAIR: A LITTLE
STANDING UP FROM CHAIR USING ARMS: A LITTLE
WALKING IN HOSPITAL ROOM: A LITTLE
DAILY ACTIVITIY SCORE: 19
MOVING FROM LYING ON BACK TO SITTING ON SIDE OF FLAT BED WITH BEDRAILS: A LITTLE
TOILETING: A LITTLE
DRESSING REGULAR LOWER BODY CLOTHING: A LITTLE
HELP NEEDED FOR BATHING: A LITTLE
DRESSING REGULAR UPPER BODY CLOTHING: A LITTLE
TURNING FROM BACK TO SIDE WHILE IN FLAT BAD: A LITTLE

## 2024-04-17 ASSESSMENT — ENCOUNTER SYMPTOMS
ACTIVITY CHANGE: 1
HEADACHES: 1
FACIAL ASYMMETRY: 1
WEAKNESS: 1
SPEECH DIFFICULTY: 1

## 2024-04-17 ASSESSMENT — PAIN DESCRIPTION - DESCRIPTORS: DESCRIPTORS: ACHING

## 2024-04-17 ASSESSMENT — LIFESTYLE VARIABLES
AUDIT-C TOTAL SCORE: 0
SKIP TO QUESTIONS 9-10: 1

## 2024-04-17 NOTE — CONSULTS
Inpatient consult to Neurology  Consult performed by: DEANDRE Mata-CNP  Consult ordered by: Billy Price MD          History Of Present Illness  Marybeth Duggan is a 78 y.o. female with past medical history of HTN, HLD, anxiety, recent subdural hemorrhage 1 week ago managed at [facility] presenting with concerns for new symptoms starting Monday 4/15.  Patient's primary language is Angolan so her daughters are in the room to help with translation.  They tell me that she was discharged last Wednesday and as of Monday she woke up and noted that her speech was slurred and she felt like her right side was weaker than her left.  She also complains of a headache since Monday.  MRI of the brain though noted to be stable on impression when reviewed notes change in the hemorrhagic area on the left with extension into the posterior region more than previous CT on 4/8.  Past Medical History  Past Medical History:   Diagnosis Date    Bilateral shoulder pain     Personal history of other diseases of the circulatory system 08/08/2013    History of hypertension    Personal history of other mental and behavioral disorders 08/08/2013    History of depression    Pure hypercholesterolemia, unspecified 09/19/2013    Low-density-lipoid-type (LDL) hyperlipoproteinemia    Thyroid condition      Surgical History  Past Surgical History:   Procedure Laterality Date    GALLBLADDER SURGERY  11/30/2016    Gallbladder Surgery    OTHER SURGICAL HISTORY  10/22/2019    Hip replacement    OTHER SURGICAL HISTORY  10/22/2019    Thyroidectomy total     Social History  Social History     Tobacco Use    Smoking status: Never    Smokeless tobacco: Never   Vaping Use    Vaping status: Never Used   Substance Use Topics    Alcohol use: Yes     Comment: occasional    Drug use: Never     Allergies  Patient has no known allergies.  Medications Prior to Admission   Medication Sig Dispense Refill Last Dose    acetaminophen (Tylenol) 325 mg tablet Take  2 by mouth every 6 hours for 5 days. 40 tablet 0     atorvastatin (Lipitor) 80 mg tablet TAKE 1 TABLET BY MOUTH AT  BEDTIME 100 tablet 2     bacitracin 500 unit/gram ointment Apply topically 3 times a day 28.4 g 0     cholecalciferol (Vitamin D-3) 25 MCG (1000 UT) tablet Take 1 tablet (25 mcg) by mouth once daily in the morning.       levETIRAcetam (Keppra) 500 mg tablet Take 1 tablet (500 mg) by mouth 2 times a day. (Patient not taking: Reported on 4/16/2024) 7 tablet 0 Not Taking    levothyroxine (Synthroid, Levoxyl) 88 mcg tablet Take 1 tablet (88 mcg) by mouth once daily. (Patient taking differently: Take 1 tablet (88 mcg) by mouth once daily in the morning. Take before meals.) 100 tablet 1     metoprolol tartrate (Lopressor) 50 mg tablet Take 1 tablet by mouth 2 times a day. 30 tablet 0     PARoxetine (Paxil) 20 mg tablet Take 1 tablet (20 mg) by mouth once daily. (Patient taking differently: Take 1 tablet (20 mg) by mouth once daily in the morning.) 90 tablet 1        Review of Systems   Constitutional:  Positive for activity change.   Neurological:  Positive for facial asymmetry, speech difficulty, weakness and headaches.     Neurological Exam  Mental Status  Alert.    Neurologic exam:    Awake alert oriented to all, no dysarthria, no aphasia  Naming repetition intact, speech is fluent  PERRL, EOMI without ptosis or nystagmus, reduced visual field to the right lower periphery, face asymmetry on the left tongue midline  Strength in right upper and lower extremity with mild weakness when compared to the left  Sensation is intact  FTN intact MIREYA intact  Heel-to-shin without ataxia  Reflexes  2 throughout,  Toes downgoing bilaterally  Gait not assessed at this time    Physical Exam  Cardiovascular:      Rate and Rhythm: Normal rate.   Pulmonary:      Effort: Pulmonary effort is normal.   Neurological:      Mental Status: She is alert.       Last Recorded Vitals  Blood pressure 152/85, pulse 66, temperature 36.6  "°C (97.9 °F), temperature source Oral, resp. rate 17, height 1.6 m (5' 3\"), weight 97.1 kg (214 lb 1.1 oz), SpO2 97%.    Relevant Results        NIH Stroke Scale  1A. Level of Consciousness: Alert, Keenly Responsive  1B. Ask Month and Age: Both Questions Right  1C. Blink Eyes & Squeeze Hands: Performs Both Tasks  2. Best Gaze: Normal  3. Visual: No Visual Loss  4. Facial Palsy: Normal Symmetrical Movements  5A. Motor - Left Arm: No Drift  5B. Motor - Right Arm: No Drift  6A. Motor - Left Leg: No Drift  6B. Motor - Right Leg: No Drift  7. Limb Ataxia: Absent  8. Sensory Loss: Normal  9. Best Language: No Aphasia  10. Dysarthria: Normal  11. Extinction and Inattention: No Abnormality  NIH Stroke Scale: 0           [place] Coma Scale  Best Eye Response: Spontaneous  Best Verbal Response: Oriented  Best Motor Response: Follows commands  [place] Coma Scale Score: 15                 I have personally reviewed the following imaging results Carotid duplex bilateral    Result Date: 4/17/2024           [place] [address]            Phone [phone]  Vascular Lab Report  [facility] US CAROTID ARTERY DUPLEX BILATERAL Patient Name:     JAYA Mora                                       Physician: Study Date:       4/17/2024           Ordering          [id number] LUCY GALLARDO                                       Provider:         DARCI MRN/PID:          [id number]            Fellow: Accession#:       [id number]        Technologist:     Marilyn Red RVT Date of           1945 / 78      Technologist 2: Birth/Age:        years Gender:           F                   Encounter#:       [id number] Admission Status: Inpatient           Location          [facility]                                       Performed:  Diagnosis/ICD: Episode of change in speech-R47.81 Indication:    Speech Disturbances, Other CPT Codes:     61348 Cerebrovascular Carotid Duplex scan complete  " Pertinent History: Dysarthria; recent subdural hematoma following head trauma.  CONCLUSIONS: Right Carotid: Findings are consistent with less than 50% stenosis of the right proximal internal carotid artery. Right external carotid artery appears patent with no evidence of stenosis. The right vertebral artery is patent with antegrade flow. No evidence of hemodynamically significant stenosis in the right subclavian artery. Left Carotid: Findings are consistent with less than 50% stenosis of the left proximal internal carotid artery. Left external carotid artery appears patent with no evidence of stenosis. The left vertebral artery is patent with antegrade flow. No evidence of hemodynamically significant stenosis in the left subclavian artery. Additional Findings: Technically difficult exam due to body habitus, respirations and vessel tortuosity.  Imaging & Doppler Findings: Right Plaque Morph: The proximal right internal carotid artery demonstrates heterogenous and calcified plaque. The proximal right external carotid artery demonstrates heterogenous plaque. The proximal right common carotid artery demonstrates heterogenous plaque. The mid right common carotid artery demonstrates heterogenous plaque. The distal right common carotid artery demonstrates heterogenous and calcified plaque. Left Plaque Morph: The proximal left internal carotid artery demonstrates heterogenous and calcified plaque. The mid left common carotid artery demonstrates heterogenous plaque. The distal left common carotid artery demonstrates heterogenous and calcified plaque.   Right                      Left   PSV     EDV                PSV     EDV 31 cm/s           CCA P    59 cm/s 30 cm/s           CCA D    55 cm/s 49 cm/s 13 cm/s   ICA P    58 cm/s 21 cm/s 67 cm/s 18 cm/s   ICA M    55 cm/s 18 cm/s 79 cm/s 22 cm/s   ICA D    64 cm/s 18 cm/s 53 cm/s            ECA     72 cm/s 45 cm/s 11 cm/s Vertebral  30 cm/s 10 cm/s 82 cm/s         Subclavian 98  cm/s                Right Left ICA/CCA Ratio  1.6  1.1   Electronically signed by on at  ** Preliminary **     MR brain wo IV contrast    Result Date: 4/16/2024  Interpreted By:  Flako Malone, STUDY: MR BRAIN WO IV CONTRAST;  4/16/2024 6:45 pm   INDICATION: Signs/Symptoms:dysarthria.   COMPARISON: Recent head CT   ACCESSION NUMBER(S): [id number]   ORDERING CLINICIAN: LUCY BEJARANO   TECHNIQUE: Axial T2, FLAIR, DWI, gradient echo T2 and sagittal and coronal T1 weighted images of brain were acquired.   FINDINGS: Subdural mixed attenuation hematoma again detected measuring up to about 8 mm. There is a subcutaneous hematoma on the left frontal region measuring 6.8 x 2.2 cm. Minimal midline shift to the right of about 3 mm not significantly changed. No evidence of new hemorrhage. No findings of acute infarct. Pituitary region and craniocervical junction otherwise within normal limits. Punctate gradient susceptibility artifact seen in the left parietal region measures about 3 mm probably hemosiderin. Global volume loss and patchy periventricular low attenuation again seen.   Stable ventricular size.       Stable left subdural hematoma as well as left subcutaneous hematoma. The appearance of the brain is not significantly changed. Mild midline shift to the right again seen. No new hemorrhage is seen   No findings of acute infarct   MACRO: None   Signed by: Flako Malone 4/16/2024 7:57 PM Dictation workstation:   [id number]    ECG 12 lead    Result Date: 4/16/2024  Sinus rhythm with frequent atrial-paced complexes low  voltage precordial leads Inferior infarct , age undetermined Abnormal ECG When compared with ECG of 07-MAR-2023 14:59, Inferior infarct is now Present clinical correlation Confirmed by Miguel Nicholson ([id number]) on 4/16/2024 12:51:11 PM    CT head wo IV contrast    Result Date: 4/15/2024  Interpreted By:  Saad Calle, STUDY: CT HEAD WO IV CONTRAST;  4/15/2024 8:52 pm   INDICATION:  Signs/Symptoms:hx of head injury.   COMPARISON: 4/8/2024   ACCESSION NUMBER(S): [id number]   ORDERING CLINICIAN: TRISHA PALACIOS   TECHNIQUE: Contiguous axial images of the head were obtained without intravenous contrast.   FINDINGS: There is grossly stable size of left subdural hematoma which measures approximately 7 mm in thickness. The subdural hematoma is however decreased in attenuation in comparison to prior examination. There is grossly stable mass effect with approximately 4 mm left to right midline shift. There is cerebral atrophy and bilateral chronic white matter change. The ventricles are stable in size. No evidence of depressed calvarial fracture large left extracranial soft tissue hematoma is again noted.       Grossly stable size of left subdural hematoma which measures approximately 7 mm in thickness. There is stable mass effect with approximately 4 mm left to right midline shift.   Large extracranial scalp soft tissue hematoma is again noted.   MACRO: None   Signed by: Saad Calle 4/15/2024 9:25 PM Dictation workstation:   [id number]    CT head wo IV contrast    Result Date: 4/8/2024  Interpreted By:  Catrachito Vargas, STUDY: CT HEAD WO IV CONTRAST;  4/8/2024 8:17 am   INDICATION: Signs/Symptoms: stability of SDH.   COMPARISON: Head CT, 04/07/2024   ACCESSION NUMBER(S): [id number]   ORDERING CLINICIAN: JANELL KINGSTON   TECHNIQUE: Noncontrast axial CT scan of the head was performed. Angled reformats in brain and bone windows were generated. The images were reviewed in bone, brain, blood and soft tissue windows.   FINDINGS: The heterogeneously hyperdense left subdural hematoma measures up to 8 mm in greatest diameter, similar to previous. A large scalp hematoma also appears similar to previous. No new intracranial hemorrhage.   Mass effect on the left cerebral hemisphere with partial effacement of the sulci and left lateral ventricle, unchanged. Rightward midline shift measures up to 4 mm at the  level of the foramen of Monro, unchanged. Patchy nonspecific low attenuation in the white matter is similar to previous and likely secondary to chronic small vessel ischemic disease. Generalized parenchymal volume loss with associated prominence of the ventricles and sulci, unchanged. The gray-white differentiation is intact. The basal cisterns are patent.   The calvarium is unremarkable. Bilateral lens replacements are again noted. Paranasal sinuses and mastoid air cells are clear.       Unchanged left subdural hematoma and associated mild rightward midline shift, no new intracranial hemorrhage.   MACRO: None   Signed by: Catrachito Vargas 4/8/2024 8:45 AM Dictation workstation:   [id number]    CT chest abdomen pelvis w IV contrast    Result Date: 4/7/2024  Interpreted By:  Davion Rosario and Liller Gregory STUDY: CT CHEST ABDOMEN PELVIS W IV CONTRAST;  4/7/2024 6:54 am   INDICATION: Signs/Symptoms:fall wit hsubdural hematoma.   COMPARISON: None.   ACCESSION NUMBER(S): [id number]   ORDERING CLINICIAN: MIKE ALBARRAN   TECHNIQUE: Contiguous axial images of the chest, abdomen, and pelvis were obtained after the intravenous administration of 85 mL Omnipaque 350 contrast. Coronal and sagittal reformatted images were reconstructed from the axial data.   FINDINGS: CT CHEST:   MEDIASTINUM AND LYMPH NODES:  No enlarged intrathoracic or axillary lymph nodes by imaging criteria.  The esophagus appears within normal limits. No pneumomediastinum.   VESSELS:  Normal caliber aorta without dissection. Mild aortic atherosclerosis.   HEART: Diffusely enlarged.  Severe coronary artery calcifications superimposed on stenting. No significant pericardial effusion.   LUNG, AIRWAYS, PLEURA: Lung volumes are decreased which is likely due to acquisition of images during expiration. Dependent densities are noted in the lung bases. No consolidation, pulmonary edema, pleural effusion or pneumothorax.   CHEST WALL SOFT TISSUES: No  discernible acute abnormality.   OSSEOUS STRUCTURES: No acute osseous injury or suspicious osseous lesion.     CT ABDOMEN/PELVIS:   ABDOMINAL WALL: Small fat containing umbilical hernia. Soft tissue fat stranding with likely component of contusion noted overlying the left gluteus musculature (series 201, image 175).   LIVER: No significant parenchymal abnormality.   BILE DUCTS: No significant intrahepatic or extrahepatic dilatation.   GALLBLADDER: Not definitively visualized and may be surgically absent.   PANCREAS: Significant abnormality.   SPLEEN: No significant abnormality. Accessory splenule noted.   ADRENALS: No significant abnormality.   KIDNEYS, URETERS, BLADDER: Bilateral kidneys enhance symmetrically. There is no hydroureteronephrosis or radiopaque stone. There is mild nonspecific bilateral perinephric fat stranding. Tiny cortically based hypodensity within the superior pole the left kidney most likely represents simple renal cyst. The bladder is unremarkable.   REPRODUCTIVE ORGANS: Limited for evaluation due to significant beam hardening artifact from adjacent bilateral total hip arthroplasties.   VESSELS: There is mild atherosclerotic calcification of the abdominal aorta and its branches. The IVC is unremarkable. Portal vein is patent.   RETROPERITONEUM/LYMPH NODES: No enlarged lymph nodes. No acute retroperitoneal abnormality.   BOWEL/MESENTERY/PERITONEUM: Stomach is distended with ingested material but is otherwise unremarkable. The large and small bowel are normal caliber without bowel wall thickening. Sigmoid diverticulosis without evidence of diverticulitis. Normal appendix.   No ascites, free air, or fluid collection.     MUSCULOSKELETAL: Postsurgical changes of bilateral total hip arthroplasties without perihardware lucency or fracture to suggest hardware failure. There is accentuation of the normal thoracic lordosis with thick bridging osteophytosis which may indicate component of diffuse  idiopathic skeletal hyperostosis. There is no acute osseous injury or suspicious osseous lesion. Moderate diffuse discogenic degenerative changes.       1. Soft tissue fat stranding and likely component of hematoma is noted within the subcutaneous tissues overlying the left gluteus asael musculature. Otherwise, there is no acute traumatic pathology within the chest, abdomen, or pelvis. 2. Severe coronary artery calcifications, indicating the presence of coronary artery disease. If the patient has associated symptoms recommend management as per chest pain guidelines (e.g. https://doi.org/10.1161/CIR.2965771530084048). If the patient is asymptomatic consider reviewing modifiable cardiovascular risk factors and managing as per guidelines for primary prevention (e.g. https://doi.org/10.1161/CIR.7131617846366548). 3. Additional findings as above.   I personally reviewed the images/study and I agree with the findings as stated above by resident physician, Dr. Alfonso Abbasi. The study was interpreted at [facility] in [facility].   MACRO: None.   Signed by: Davion Rosario 4/7/2024 8:56 AM Dictation workstation:   [id number]    CT head wo IV contrast    Result Date: 4/7/2024  Interpreted By:  Luca Nixon and Liller Gregory STUDY: CT HEAD WO IV CONTRAST;  4/7/2024 6:47 am   INDICATION: Signs/Symptoms:subdural with midline shift.   COMPARISON: Same day CT head 01:07 a.m.   ACCESSION NUMBER(S): [id number]   ORDERING CLINICIAN: MIKE ALBARRAN   TECHNIQUE: Noncontrast axial CT scan of head was performed. Angled reformats in brain and bone windows were generated. The images were reviewed in bone, brain, blood and soft tissue windows.   FINDINGS: Redemonstration of large hematoma overlying the left frontoparietal scalp. Similar size and appearance of acute 7 mm subdural hematoma overlying the left cerebral convexity with associated sulcal effacement and mass effect.  Additionally, there is 4 mm of left-to-right midline shift which is similar to prior same day CT where it measured 4.5 cm. Trace anterior parafalcine extension of the hematoma suspected.   No new site of intracranial hemorrhage is identified. There is no uncal or subfalcine herniation. The gray-white matter differentiation is diffusely intact with similar appearance of chronic small vessel ischemic disease.   Aside from the aforementioned sulcal effacement, the visualized ventricles and sulci are unremarkable.   The calvarium is unremarkable. The visualized paranasal sinuses and mastoid air cells are clear. Bilateral native lens extractions. Visualized orbits are unremarkable.       1. Stable 7 mm subdural hematoma along the left cerebral convexity with trace parafalcine extension and 4 mm of left-to-right midline shift with associated sulcal effacement. 2. Large left scalp hematoma also appear similar. 3. No new site of intracranial hemorrhage, herniation, or large territory ischemic infarction.   I personally reviewed the images/study and I agree with the findings as stated above by resident physician, Dr. Alfonso Abbasi. The study was interpreted at [facility] in [facility].   MACRO: none   Signed by: Luca Nixon 4/7/2024 7:35 AM Dictation workstation:   [id number]    XR hand right 3+ views    Result Date: 4/7/2024  STUDY: Hand Radiographs; 4/7/2024 4:16 AM. INDICATION: Hand pain. COMPARISON: None Available. ACCESSION NUMBER(S): [id number] ORDERING CLINICIAN: ALTAGRACIA MORENO TECHNIQUE:  Three views of the right hand. FINDINGS:  There is no displaced fracture.  The alignment is anatomic.  No soft tissue abnormality is seen.  Multifocal degenerative changes including moderate degenerative changes of the first MCP joint.  Mild degenerative change of the first CMC and first IP joint.  Mild degenerative changes of the second and third PIP and DIP joints    No acute  osseous abnormality.. Signed by Jean Spivey MD    CT cervical spine wo IV contrast    Result Date: 4/7/2024  Interpreted By:  Emilia Stark, STUDY: CT CERVICAL SPINE WO IV CONTRAST;  4/7/2024 2:04 am   INDICATION: Signs/Symptoms:Trauma.   COMPARISON: None. CT head 04/07/2023   ACCESSION NUMBER(S): [id number]   ORDERING CLINICIAN: ALTAGRACIA MORENO   TECHNIQUE: Axial noncontrast images of the cervical spine with coronal and sagittal reconstructed images.   FINDINGS: ALIGNMENT: Normal. VERTEBRAE: No acute fracture. SPINAL CANAL: Prominent anterior osteophytes noted. Degenerative changes facet and uncinate arthropathy, as well as disc space narrowing and end plate hypertrophy. Mild-to-moderate right foraminal narrowing at C2-C3. Mild right foraminal narrowing at C3-C4 and C4-C5 no critical spinal canal stenosis. PREVERTEBRAL SOFT TISSUES: No prevertebral soft tissue swelling. Calcifications within the nuchal ligament. LUNG APICES: Imaged portion of the lung apices are within normal limits.   OTHER FINDINGS: The right thyroid is atrophic with hypertrophic left thyroid noted. Atherosclerotic calcifications of the carotid arteries.       No acute fracture or traumatic subluxation of the cervical spine.     MACRO: None.   Signed by: Emilia Stark 4/7/2024 2:10 AM Dictation workstation:   [id number]    XR chest 1 view    Result Date: 4/7/2024  Interpreted By:  Khoi Burrows, STUDY: XR CHEST 1 VIEW;  4/7/2024 1:44 am   INDICATION: Signs/Symptoms:Trauma.   COMPARISON: None.   ACCESSION NUMBER(S): [id number]   ORDERING CLINICIAN: ALTAGRACIA MORENO   FINDINGS:     Cardiomegaly. The pulmonary vasculature is within normal limits. Atherosclerotic aorta. No consolidation, pleural effusion or pneumothorax.   Although study technique is not optimized for rib evaluation, no displaced fractures are seen.       Cardiomegaly without evidence of acute disease in the chest.     MACRO: None.   Signed by: Khoi Burrows 4/7/2024 1:53  AM Dictation workstation:   [id number]    CT head wo IV contrast    Result Date: 4/7/2024  Interpreted By:  Nereida Patino, STUDY: CT HEAD WO IV CONTRAST;  4/7/2024 1:15 am   INDICATION: Signs/Symptoms:Trauma.   COMPARISON: 12/12/2019   ACCESSION NUMBER(S): [id number]   ORDERING CLINICIAN: ALTAGRACIA MORENO   TECHNIQUE: Axial noncontrast CT images of the head.   FINDINGS: BRAIN PARENCHYMA: Deep and periventricular white matter hypodensities are nonspecific, but favored to represent chronic small vessel ischemic changes.  Gray-white matter interfaces are preserved. 5 mm rightward midline shift.   HEMORRHAGE: There is an acute subdural hematoma overlying the left cerebral cortex measuring 7 mm in maximal thickness. Mass effect noted on the underlying cortex. VENTRICLES and EXTRA-AXIAL SPACES: The ventricles and sulci are within normal limits in size for brain volume. No abnormal extraaxial fluid collection. EXTRACRANIAL SOFT TISSUES: Very large left sided frontotemporal scalp hematoma. PARANASAL SINUSES/MASTOIDS:  The visualized paranasal sinuses and mastoid air cells are aerated. CALVARIUM: No depressed skull fracture. No destructive osseous lesion.   OTHER FINDINGS: None.       Acute subdural hematoma overlying the left cerebral convexity measuring 7 mm in maximal thickness. 5 mm rightward midline shift.   Very large left-sided frontotemporal scalp hematoma.   MACRO: Nereida Patino discussed the significance and urgency of this critical finding by telephone with  ALTAGRACIA MORENO on 4/7/2024 at 1:26 am. (**-RCF-**) Findings:  See findings.   Signed by: Nereida Patino 4/7/2024 1:27 AM Dictation workstation:   [id number]  .      Assessment/Plan   Principal Problem:    TIA (transient ischemic attack)  Active Problems:    Dysarthria      78-year-old female with medical history with significant of fall 1 week ago with subdural hemorrhage managed at [facility] with discharge On 4/10 developed new symptoms on Monday 4/15 of  right-sided weakness, dysarthria.  Exam with noted mild right upper and lower extremity weakness and right facial weakness as well as right lower peripheral visual loss.  Given changes in exam as well as noted enlargement of hemorrhagic area on the MRI recommend transfer to [facility].  Discussed with Dr. Price he will put in transfer orders.  Patient and family aware.           Adamaris Wiggins, DEANDRE-CNP

## 2024-04-17 NOTE — PROGRESS NOTES
Spiritual Care Visit    Clinical Encounter Type  Visited With: Patient and family together  Routine Visit: Introduction  Continue Visiting: No         Values/Beliefs  Spiritual Requests During Hospitalization: Anointing only. No Communion    Sacramental Encounters  Communion: Does not want communion  Communion Given Indicator: No  Sacrament of Sick-Anointing: Anointed     Alfonso Calderon

## 2024-04-17 NOTE — DISCHARGE SUMMARY
Discharge Diagnosis  TIA (transient ischemic attack).Recent subdural hematoma.Intermittent dysarthria    Issues Requiring Follow-Up  Neurosurgery at  Main campus    Discharge Meds     Your medication list        CHANGE how you take these medications        Instructions Last Dose Given Next Dose Due   levothyroxine 88 mcg tablet  Commonly known as: Synthroid, Levoxyl  What changed: when to take this      Take 1 tablet (88 mcg) by mouth once daily.       PARoxetine 20 mg tablet  Commonly known as: Paxil  What changed: when to take this      Take 1 tablet (20 mg) by mouth once daily.              CONTINUE taking these medications        Instructions Last Dose Given Next Dose Due   acetaminophen 325 mg tablet  Commonly known as: Tylenol      Take 2 by mouth every 6 hours for 5 days.       atorvastatin 80 mg tablet  Commonly known as: Lipitor      TAKE 1 TABLET BY MOUTH AT  BEDTIME       bacitracin 500 unit/gram ointment      Apply topically 3 times a day       cholecalciferol 25 MCG (1000 UT) tablet  Commonly known as: Vitamin D-3           metoprolol tartrate 50 mg tablet  Commonly known as: Lopressor      Take 1 tablet by mouth 2 times a day.              STOP taking these medications      levETIRAcetam 500 mg tablet  Commonly known as: Keppra                 Test Results Pending At Discharge  Pending Labs       No current pending labs.            Hospital Course   Marybeth Dgugan is a 78 y.o. female with past medical history significant for hypertension, dyslipidemia, anxiety, hypothyroidism, history of recent subdural hematoma hemorrhage, osteoarthritis presenting with sudden onset of slurred speech and dysarthria that was noted by her daughter at 6 PM when patient was talking on the phone.  She became incomprehensible.  She was seen in this facility on 4/7/2024 due to a fall and head injury.  Patient was found to have a subdural hematoma and therefore was life flighted downtown to [facility].  She was discharged 5  days ago and has been stable at home.  She has been complaining of intermittent headache that was new from her baseline.  Otherwise she denies any facial droop, muscle weakness, dizziness, double vision, numbness or tingling of upper and lower extremities.Admitted to stepdown unit with 1 hour neurochecks.  Evaluated by neurology service.  MRI of brain did not reveal any acute findings which is stable subdural hematoma and slight shift.  However according to Dr. Red it seems that hematoma is expanding.  She requested transfer the patient to the [facility] for further neurosurgical consultation and care.  She has been hemodynamically stable without additional neurological deficit except for intermittent dysarthria and confusion.  Her daughter has been notified throughout the hospital stay.  And she is being transferred to [facility] for further management.  Accepting physician Dr. Troy    Pertinent Physical Exam At Time of Discharge  Physical Exam  General-awake, alert, oriented x3  Lung-clear to auscultation bilaterally  Heart-regular sinus rhythm and rhythm, S1 and S2 audible,no murmurs  Abdomen- soft, nontender, nondistended, good bowel sounds, no organomegaly, no mass, bowel sounds are present  Extremities-no clubbing, no cyanosis, no edema  Skin- An abrasion and ecchymosis on the left frontal area and left zygomatic area  Outpatient Follow-Up  Future Appointments   Date Time Provider Department Center   4/18/2024  9:30 AM [facility]   4/20/2024 To Be Determined Jeovany Heard PT [facility]   4/26/2024  1:15 PM Sunday Goldsmith MD [id number] [facility]   4/30/2024 11:00 AM JACKSON CT 1 [facility]   5/6/2024  2:00 PM Ryan Andre PA-C [id number] [place]   9/25/2024  2:40 PM Ashwin Garay MD [id number] [facility]         Billy Price MD

## 2024-04-17 NOTE — PROGRESS NOTES
Marybeth Duggan is a 78 y.o. female on day 0 of admission presenting with TIA (transient ischemic attack).      Subjective   Patient has had intermittent difficulty speaking       Objective     Last Recorded Vitals  /85 (BP Location: Right arm, Patient Position: Lying)   Pulse 66   Temp 36.2 °C (97.2 °F) (Oral)   Resp 17   Wt 97.1 kg (214 lb 1.1 oz)   SpO2 93%   Intake/Output last 3 Shifts:    Intake/Output Summary (Last 24 hours) at 4/17/2024 0855  Last data filed at 4/17/2024 0750  Gross per 24 hour   Intake 480 ml   Output 150 ml   Net 330 ml       Admission Weight  Weight: 95.8 kg (211 lb 1.6 oz) (04/15/24 2029)    Daily Weight  04/17/24 : 97.1 kg (214 lb 1.1 oz)    Image Results  MR brain wo IV contrast  Narrative: Interpreted By:  Flako Malone,   STUDY:  MR BRAIN WO IV CONTRAST;  4/16/2024 6:45 pm      INDICATION:  Signs/Symptoms:dysarthria.      COMPARISON:  Recent head CT      ACCESSION NUMBER(S):  [id number]      ORDERING CLINICIAN:  LUCY BEJARANO      TECHNIQUE:  Axial T2, FLAIR, DWI, gradient echo T2 and sagittal and coronal T1  weighted images of brain were acquired.      FINDINGS:  Subdural mixed attenuation hematoma again detected measuring up to  about 8 mm. There is a subcutaneous hematoma on the left frontal  region measuring 6.8 x 2.2 cm. Minimal midline shift to the right of  about 3 mm not significantly changed. No evidence of new hemorrhage.  No findings of acute infarct. Pituitary region and craniocervical  junction otherwise within normal limits. Punctate gradient  susceptibility artifact seen in the left parietal region measures  about 3 mm probably hemosiderin. Global volume loss and patchy  periventricular low attenuation again seen.      Stable ventricular size.      Impression: Stable left subdural hematoma as well as left subcutaneous hematoma.  The appearance of the brain is not significantly changed. Mild  midline shift to the right again seen. No new hemorrhage is  seen      No findings of acute infarct      MACRO:  None      Signed by: Flako Malone 4/16/2024 7:57 PM  Dictation workstation:   [id number]  ECG 12 lead  Sinus rhythm with frequent atrial-paced complexes  low  voltage precordial leads  Inferior infarct , age undetermined  Abnormal ECG  When compared with ECG of 07-MAR-2023 14:59,    Inferior infarct is now Present  clinical correlation  Confirmed by Miguel Nicholson ([id number]) on 4/16/2024 12:51:11 PM      Physical Exam  General-awake, alert, oriented x3 speaks Gratian  Lung-clear to auscultation bilaterally  Heart-regular sinus rhythm and rhythm, S1 and S2 audible,no murmurs  Abdomen- soft, nontender, nondistended, good bowel sounds, no organomegaly, no mass, bowel sounds are present  Extremities-no clubbing, no cyanosis, no edema.  Wound-none applicable  Relevant Results               Assessment/Plan    Dysarthria, rule out stroke-as per neurology her subdural hematoma has expanded somewhat.Will transfer to the Main campus for further treatment  Hypertension-continue metoprolol 50 mg p.o. twice daily and monitor  Dyslipidemia-I will continue Lipitor 80 mg p.o. daily and check lipid profile  Hypothyroidism-I will continue levothyroxine 88 mcg p.o. daily and check TSH in the morning  Disposition-I will consult PT/OT.  Discussed with daughter in length                              Billy Price MD

## 2024-04-17 NOTE — PROGRESS NOTES
TCC met with patient at the bedside along with daughter. Pt is Tajik speaking and daughter translated. Pt is living with her daughter at this time. Pt is not on oxygen at home or on dialysis. Pt has had 2 falls recently requiring hospitalization. Pt uses a cane and a walker to ambulate. Pt does not shop, cook or clean. Daughter is able to. Pt does not smoke cigarettes or drink alcohol. Pt is not a diabetic. PCP is Dr. Price. Pharmacy of choice is [facility] [address] [facility]. Pt has a LW and POA is both daughters. Daughter states patient was set up with [facility] and first visit was cancelled due to admission to hospital. Neurology called in transfer to [facility] for closer monitoring. Pt was accepted and is waiting on bed availability.   TCC to follow.     DISCHARGE PLAN NOT SECURE AT THIS TIME.        04/17/24 1429   Discharge Planning   Living Arrangements Family members;Children   Support Systems Children;Family members   Assistance Needed ambulation   Type of Residence Private residence   Number of Stairs to Enter Residence 3   Number of Stairs Within Residence 0   Do you have animals or pets at home? No   Who is requesting discharge planning? Provider   Home or Post Acute Services In home services   Type of Home Care Services Home OT;Home PT   Patient expects to be discharged to: active with [facility] but tranfering to [facility]   Does the patient need discharge transport arranged? Yes   RoundTrip coordination needed? Yes   Has discharge transport been arranged? No   Financial Resource Strain   How hard is it for you to pay for the very basics like food, housing, medical care, and heating? Not hard   Housing Stability   In the last 12 months, was there a time when you were not able to pay the mortgage or rent on time? N   In the last 12 months, how many places have you lived? 1   In the last 12 months, was there a time when you did not have a steady place to sleep or slept in a shelter (including now)? N    Transportation Needs   In the past 12 months, has lack of transportation kept you from medical appointments or from getting medications? no   In the past 12 months, has lack of transportation kept you from meetings, work, or from getting things needed for daily living? No

## 2024-04-17 NOTE — PROGRESS NOTES
04/17/24 1438   Current Planned Discharge Disposition   Current Planned Discharge Disposition Admitted

## 2024-04-17 NOTE — NURSING NOTE
Patient is resting in bed. Call light in reach. Patient is A&Ox3 and denies pain. Patient is normal sinus on monitor @ 65bpm.  Bed is low and locked and bed alarm is on.

## 2024-04-17 NOTE — SIGNIFICANT EVENT
RT at bedside for itdaniel called on pt with slurred speech and facial droop. Pt is 94% on room air. No signs of respiratory distress. Dr Herbert at bedside Rt not needed at this time

## 2024-04-17 NOTE — PROGRESS NOTES
Occupational Therapy                 Therapy Communication Note    Patient Name: Marybeth Duggan  MRN: [id number]  Today's Date: 4/17/2024     Discipline: Occupational Therapy    Missed Visit Reason: Cancel (I-Team called today due to pt having slurred speech per daughter. Ongoing workup after her event happening.)    Missed Time: Cancel

## 2024-04-17 NOTE — NURSING NOTE
Patient is lying in bed resting. Call light in reach. Patient denies pain and is A&Ox3. Patient is normal sinus on monitor @ 61bpm. Bed is low and locked , bed alarm is on.

## 2024-04-17 NOTE — SIGNIFICANT EVENT
I responded to I team immediately.  According to the nurse patient had difficulty talking.  Patient does not speak English.  Her family is present in the room.  Patient complained of headache.  According to her daughter patient had slurred speech.  Patient follows simple commands and answer simple question.  Patient presented to ER with mechanical fall.  CT brain admission revealed left subdural hematoma associated with mild rightward midline shift  MRI brain revealed stable left subdural hematoma as well left subcutaneous hematoma mild midline shift to the right.    General: Cooperating during physical exam, patient speak Mongolian.  HEENT: Large hematoma on the left side of the had large ecchymotic area pupils are equal and reactive to light and commendation , oral mucosa moist, no JVD   Cardiovascular: Normal sinus rhythm, no MRG.  Lungs: Clear to auscultation bilaterally, no wheezing, no crackles, no dullness to percussion.  Abdomen: No hepatosplenomegaly appreciated, soft , not tender, positive bowel sounds, positive bowel movement.  Neuro: Alert and oriented to herself, strength in upper lower extremities 5 out of 5.  Follows simple commands  Musculoskeletal: No swelling in lower extremities, no limitation in range of motion.  Vascular: Pulses are intact in upper and lower extremities  Skin: No petechiae, ecchymosis or other stigmata for dermatology disease.     Encephalopathy  Change mental status worse, slurred speech.  Secondary to subdural hematoma  MRI brain done in hospital revealed a left subdural hematoma as well as left subcutaneous hematoma  There is a mild midline shift to the right  Patient was positioned on the bed with the head elevated 30 degree  Monitor close blood pressure  CT head stat evaluate hematoma.  Patient is waiting for bed at [facility].  He is at high risk for due to radiation having life-threatening arrhythmias  Discussed in detail with her family present in the  room.  Waiting for neurosurgery to call me back  My phone number provided.  Patient is not on blood thinner  Neurocheck every 15 minutes  Patient need to be transferred to [facility] to be evaluated by neurosurgery.    Time spent with the patient 35 minutes critical care.  Addendum   13:19  Discussed with Dr. Saldana neurosurgery on the case.  He stated to call trauma surgery.  I talked to the transfer center.  I discussed in detail the case with Dr. Troy, surgery at [facility].  Patient was accepted at [facility].  Waiting for the bed to be available.  Patient is clinically hemodynamically stable.  Patient can get transfer with ALS transport to [facility].  Dr. Price admitting physician notified  Family was updated  Dr. Price to follow up.

## 2024-04-17 NOTE — NURSING NOTE
Assumed care of patient. Patient arrived back to room from MRI. Family is at the bedside. Patient is A&Ox3 but has slurred speech. Patient denies pain and is now sitting in bed with no distress on room air. Bed is low and locked . Bruising noted on left side of head, face and left hip and right hand.

## 2024-04-17 NOTE — NURSING NOTE
Patient is experiencing neuro symptoms again. And for longer duration. Dr. Price notified. Dr. Red notified. And transfer center called. Still awaiting a bed. Continuing Q15 vitals and neuro checks as ordered.

## 2024-04-17 NOTE — NURSING NOTE
Patient still experiencing worsening neuro symptoms. MCKINLEY Prater called transfer center to escalate the patients need for a bed. They stated they would do a doctors conference and get back to us. Patient family very worried for patients status. Waiting to hear back.

## 2024-04-17 NOTE — PROGRESS NOTES
Physical Therapy                 Therapy Communication Note    Patient Name: Marybeth Duggan  MRN: [id number]  Today's Date: 4/17/2024     Discipline: Physical Therapy    Missed Visit Reason:  Cancel PT Evaluation; I-TEAM called; Ongoing workup after patient event. possible transfer to [facility]

## 2024-04-18 ENCOUNTER — APPOINTMENT (OUTPATIENT)
Dept: NEUROLOGY | Facility: HOSPITAL | Age: 79
End: 2024-04-18
Payer: MEDICARE

## 2024-04-18 ENCOUNTER — APPOINTMENT (OUTPATIENT)
Dept: NEUROLOGY | Facility: HOSPITAL | Age: 79
DRG: 101 | End: 2024-04-18
Payer: MEDICARE

## 2024-04-18 PROBLEM — R41.82 FLUCTUATING MENTAL STATUS: Status: ACTIVE | Noted: 2024-04-18

## 2024-04-18 LAB
ABO GROUP (TYPE) IN BLOOD: NORMAL
ALBUMIN SERPL BCP-MCNC: 3.4 G/DL (ref 3.4–5)
ALP SERPL-CCNC: 98 U/L (ref 33–136)
ALT SERPL W P-5'-P-CCNC: 16 U/L (ref 7–45)
AMMONIA PLAS-SCNC: 24 UMOL/L (ref 16–53)
ANION GAP BLDV CALCULATED.4IONS-SCNC: 12 MMOL/L (ref 10–25)
ANION GAP SERPL CALC-SCNC: 14 MMOL/L (ref 10–20)
ANTIBODY SCREEN: NORMAL
APPEARANCE UR: CLEAR
APTT PPP: 22 SECONDS (ref 27–38)
AST SERPL W P-5'-P-CCNC: 17 U/L (ref 9–39)
BASE EXCESS BLDV CALC-SCNC: 1.1 MMOL/L (ref -2–3)
BASOPHILS # BLD AUTO: 0.04 X10*3/UL (ref 0–0.1)
BASOPHILS NFR BLD AUTO: 0.4 %
BILIRUB SERPL-MCNC: 0.8 MG/DL (ref 0–1.2)
BILIRUB UR STRIP.AUTO-MCNC: NEGATIVE MG/DL
BODY TEMPERATURE: 37 DEGREES CELSIUS
BUN SERPL-MCNC: 24 MG/DL (ref 6–23)
CA-I BLDV-SCNC: 1.13 MMOL/L (ref 1.1–1.33)
CALCIUM SERPL-MCNC: 8.6 MG/DL (ref 8.6–10.6)
CHLORIDE BLDV-SCNC: 102 MMOL/L (ref 98–107)
CHLORIDE SERPL-SCNC: 103 MMOL/L (ref 98–107)
CO2 SERPL-SCNC: 25 MMOL/L (ref 21–32)
COLOR UR: NORMAL
CREAT SERPL-MCNC: 0.89 MG/DL (ref 0.5–1.05)
EGFRCR SERPLBLD CKD-EPI 2021: 66 ML/MIN/1.73M*2
EOSINOPHIL # BLD AUTO: 0.24 X10*3/UL (ref 0–0.4)
EOSINOPHIL NFR BLD AUTO: 2.4 %
ERYTHROCYTE [DISTWIDTH] IN BLOOD BY AUTOMATED COUNT: 12.1 % (ref 11.5–14.5)
GLUCOSE BLD MANUAL STRIP-MCNC: 106 MG/DL (ref 74–99)
GLUCOSE BLD MANUAL STRIP-MCNC: 112 MG/DL (ref 74–99)
GLUCOSE BLD MANUAL STRIP-MCNC: 116 MG/DL (ref 74–99)
GLUCOSE BLD MANUAL STRIP-MCNC: 116 MG/DL (ref 74–99)
GLUCOSE BLD MANUAL STRIP-MCNC: 124 MG/DL (ref 74–99)
GLUCOSE BLD MANUAL STRIP-MCNC: 192 MG/DL (ref 74–99)
GLUCOSE BLDV-MCNC: 105 MG/DL (ref 74–99)
GLUCOSE SERPL-MCNC: 99 MG/DL (ref 74–99)
GLUCOSE UR STRIP.AUTO-MCNC: NORMAL MG/DL
HCO3 BLDV-SCNC: 25.1 MMOL/L (ref 22–26)
HCT VFR BLD AUTO: 33.7 % (ref 36–46)
HCT VFR BLD EST: 40 % (ref 36–46)
HGB BLD-MCNC: 11.8 G/DL (ref 12–16)
HGB BLDV-MCNC: 13.4 G/DL (ref 12–16)
HOLD SPECIMEN: NORMAL
IMM GRANULOCYTES # BLD AUTO: 0.04 X10*3/UL (ref 0–0.5)
IMM GRANULOCYTES NFR BLD AUTO: 0.4 % (ref 0–0.9)
INHALED O2 CONCENTRATION: 21 %
INR PPP: 1.1 (ref 0.9–1.1)
KETONES UR STRIP.AUTO-MCNC: NEGATIVE MG/DL
LACTATE BLDV-SCNC: 1.3 MMOL/L (ref 0.4–2)
LEUKOCYTE ESTERASE UR QL STRIP.AUTO: NEGATIVE
LYMPHOCYTES # BLD AUTO: 2.47 X10*3/UL (ref 0.8–3)
LYMPHOCYTES NFR BLD AUTO: 24.8 %
MAGNESIUM SERPL-MCNC: 1.86 MG/DL (ref 1.6–2.4)
MCH RBC QN AUTO: 32.2 PG (ref 26–34)
MCHC RBC AUTO-ENTMCNC: 35 G/DL (ref 32–36)
MCV RBC AUTO: 92 FL (ref 80–100)
MONOCYTES # BLD AUTO: 0.69 X10*3/UL (ref 0.05–0.8)
MONOCYTES NFR BLD AUTO: 6.9 %
NEUTROPHILS # BLD AUTO: 6.46 X10*3/UL (ref 1.6–5.5)
NEUTROPHILS NFR BLD AUTO: 65.1 %
NITRITE UR QL STRIP.AUTO: NEGATIVE
NRBC BLD-RTO: 0 /100 WBCS (ref 0–0)
OXYHGB MFR BLDV: 92.8 % (ref 45–75)
PCO2 BLDV: 37 MM HG (ref 41–51)
PH BLDV: 7.44 PH (ref 7.33–7.43)
PH UR STRIP.AUTO: 5.5 [PH]
PHOSPHATE SERPL-MCNC: 4.1 MG/DL (ref 2.5–4.9)
PLATELET # BLD AUTO: 330 X10*3/UL (ref 150–450)
PO2 BLDV: 68 MM HG (ref 35–45)
POTASSIUM BLDV-SCNC: 4.4 MMOL/L (ref 3.5–5.3)
POTASSIUM SERPL-SCNC: 4.3 MMOL/L (ref 3.5–5.3)
PROT SERPL-MCNC: 6.2 G/DL (ref 6.4–8.2)
PROT UR STRIP.AUTO-MCNC: NEGATIVE MG/DL
PROTHROMBIN TIME: 12 SECONDS (ref 9.8–12.8)
RBC # BLD AUTO: 3.67 X10*6/UL (ref 4–5.2)
RBC # UR STRIP.AUTO: NEGATIVE /UL
RH FACTOR (ANTIGEN D): NORMAL
SAO2 % BLDV: 95 % (ref 45–75)
SODIUM BLDV-SCNC: 135 MMOL/L (ref 136–145)
SODIUM SERPL-SCNC: 138 MMOL/L (ref 136–145)
SP GR UR STRIP.AUTO: 1.02
UROBILINOGEN UR STRIP.AUTO-MCNC: NORMAL MG/DL
VIT B12 SERPL-MCNC: 274 PG/ML (ref 211–911)
WBC # BLD AUTO: 9.9 X10*3/UL (ref 4.4–11.3)

## 2024-04-18 PROCEDURE — 83735 ASSAY OF MAGNESIUM: CPT | Performed by: NURSE PRACTITIONER

## 2024-04-18 PROCEDURE — 82947 ASSAY GLUCOSE BLOOD QUANT: CPT | Mod: 91

## 2024-04-18 PROCEDURE — 95700 EEG CONT REC W/VID EEG TECH: CPT

## 2024-04-18 PROCEDURE — 2500000004 HC RX 250 GENERAL PHARMACY W/ HCPCS (ALT 636 FOR OP/ED): Performed by: STUDENT IN AN ORGANIZED HEALTH CARE EDUCATION/TRAINING PROGRAM

## 2024-04-18 PROCEDURE — 36415 COLL VENOUS BLD VENIPUNCTURE: CPT | Performed by: NURSE PRACTITIONER

## 2024-04-18 PROCEDURE — 84100 ASSAY OF PHOSPHORUS: CPT | Performed by: NURSE PRACTITIONER

## 2024-04-18 PROCEDURE — 85730 THROMBOPLASTIN TIME PARTIAL: CPT | Mod: 91 | Performed by: NURSE PRACTITIONER

## 2024-04-18 PROCEDURE — 84425 ASSAY OF VITAMIN B-1: CPT | Performed by: NURSE PRACTITIONER

## 2024-04-18 PROCEDURE — 84132 ASSAY OF SERUM POTASSIUM: CPT | Performed by: NURSE PRACTITIONER

## 2024-04-18 PROCEDURE — 2500000004 HC RX 250 GENERAL PHARMACY W/ HCPCS (ALT 636 FOR OP/ED): Performed by: NURSE PRACTITIONER

## 2024-04-18 PROCEDURE — 99232 SBSQ HOSP IP/OBS MODERATE 35: CPT | Performed by: SURGERY

## 2024-04-18 PROCEDURE — 1200000002 HC GENERAL ROOM WITH TELEMETRY DAILY

## 2024-04-18 PROCEDURE — 86900 BLOOD TYPING SEROLOGIC ABO: CPT | Mod: 91 | Performed by: NURSE PRACTITIONER

## 2024-04-18 PROCEDURE — 92610 EVALUATE SWALLOWING FUNCTION: CPT | Mod: GN

## 2024-04-18 PROCEDURE — 95720 EEG PHY/QHP EA INCR W/VEEG: CPT | Performed by: STUDENT IN AN ORGANIZED HEALTH CARE EDUCATION/TRAINING PROGRAM

## 2024-04-18 PROCEDURE — 2500000001 HC RX 250 WO HCPCS SELF ADMINISTERED DRUGS (ALT 637 FOR MEDICARE OP): Performed by: NURSE PRACTITIONER

## 2024-04-18 PROCEDURE — 95715 VEEG EA 12-26HR INTMT MNTR: CPT

## 2024-04-18 PROCEDURE — 92523 SPEECH SOUND LANG COMPREHEN: CPT | Mod: GN

## 2024-04-18 PROCEDURE — 82607 VITAMIN B-12: CPT | Performed by: NURSE PRACTITIONER

## 2024-04-18 PROCEDURE — 85025 COMPLETE CBC W/AUTO DIFF WBC: CPT | Performed by: NURSE PRACTITIONER

## 2024-04-18 PROCEDURE — 99291 CRITICAL CARE FIRST HOUR: CPT | Performed by: NURSE PRACTITIONER

## 2024-04-18 PROCEDURE — 99222 1ST HOSP IP/OBS MODERATE 55: CPT

## 2024-04-18 PROCEDURE — 97161 PT EVAL LOW COMPLEX 20 MIN: CPT | Mod: GP | Performed by: STUDENT IN AN ORGANIZED HEALTH CARE EDUCATION/TRAINING PROGRAM

## 2024-04-18 PROCEDURE — 2500000001 HC RX 250 WO HCPCS SELF ADMINISTERED DRUGS (ALT 637 FOR MEDICARE OP)

## 2024-04-18 PROCEDURE — 81003 URINALYSIS AUTO W/O SCOPE: CPT | Performed by: NURSE PRACTITIONER

## 2024-04-18 PROCEDURE — 82140 ASSAY OF AMMONIA: CPT | Performed by: NURSE PRACTITIONER

## 2024-04-18 RX ORDER — ENOXAPARIN SODIUM 100 MG/ML
30 INJECTION SUBCUTANEOUS EVERY 12 HOURS
Status: DISCONTINUED | OUTPATIENT
Start: 2024-04-18 | End: 2024-04-22 | Stop reason: HOSPADM

## 2024-04-18 RX ORDER — ATORVASTATIN CALCIUM 80 MG/1
80 TABLET, FILM COATED ORAL NIGHTLY
Status: DISCONTINUED | OUTPATIENT
Start: 2024-04-18 | End: 2024-04-22 | Stop reason: HOSPADM

## 2024-04-18 RX ORDER — METOPROLOL TARTRATE 1 MG/ML
5 INJECTION, SOLUTION INTRAVENOUS EVERY 6 HOURS PRN
Status: DISCONTINUED | OUTPATIENT
Start: 2024-04-18 | End: 2024-04-21

## 2024-04-18 RX ORDER — METOPROLOL TARTRATE 50 MG/1
50 TABLET ORAL 2 TIMES DAILY
Status: DISCONTINUED | OUTPATIENT
Start: 2024-04-18 | End: 2024-04-18

## 2024-04-18 RX ORDER — DEXTROSE 50 % IN WATER (D50W) INTRAVENOUS SYRINGE
25
Status: DISCONTINUED | OUTPATIENT
Start: 2024-04-18 | End: 2024-04-22 | Stop reason: HOSPADM

## 2024-04-18 RX ORDER — SODIUM CHLORIDE 9 MG/ML
75 INJECTION, SOLUTION INTRAVENOUS CONTINUOUS
Status: DISCONTINUED | OUTPATIENT
Start: 2024-04-18 | End: 2024-04-19

## 2024-04-18 RX ORDER — LEVOTHYROXINE SODIUM 88 UG/1
88 TABLET ORAL DAILY
Status: DISCONTINUED | OUTPATIENT
Start: 2024-04-18 | End: 2024-04-22 | Stop reason: HOSPADM

## 2024-04-18 RX ORDER — PAROXETINE HYDROCHLORIDE 20 MG/1
20 TABLET, FILM COATED ORAL DAILY
Status: DISCONTINUED | OUTPATIENT
Start: 2024-04-18 | End: 2024-04-22 | Stop reason: HOSPADM

## 2024-04-18 RX ORDER — LEVETIRACETAM 10 MG/ML
1000 INJECTION INTRAVASCULAR ONCE
Status: COMPLETED | OUTPATIENT
Start: 2024-04-18 | End: 2024-04-18

## 2024-04-18 RX ORDER — LEVETIRACETAM 5 MG/ML
500 INJECTION INTRAVASCULAR EVERY 12 HOURS
Status: DISCONTINUED | OUTPATIENT
Start: 2024-04-18 | End: 2024-04-18

## 2024-04-18 RX ORDER — MAGNESIUM SULFATE HEPTAHYDRATE 40 MG/ML
2 INJECTION, SOLUTION INTRAVENOUS ONCE
Status: COMPLETED | OUTPATIENT
Start: 2024-04-18 | End: 2024-04-18

## 2024-04-18 RX ORDER — ACETAMINOPHEN 325 MG/1
975 TABLET ORAL ONCE
Status: COMPLETED | OUTPATIENT
Start: 2024-04-18 | End: 2024-04-18

## 2024-04-18 RX ORDER — ACETAMINOPHEN 325 MG/1
975 TABLET ORAL EVERY 6 HOURS PRN
Status: DISCONTINUED | OUTPATIENT
Start: 2024-04-18 | End: 2024-04-22 | Stop reason: HOSPADM

## 2024-04-18 RX ORDER — AMOXICILLIN 250 MG
2 CAPSULE ORAL NIGHTLY
Status: DISCONTINUED | OUTPATIENT
Start: 2024-04-18 | End: 2024-04-22 | Stop reason: HOSPADM

## 2024-04-18 RX ORDER — ACETAMINOPHEN 10 MG/ML
1000 INJECTION, SOLUTION INTRAVENOUS ONCE
Status: COMPLETED | OUTPATIENT
Start: 2024-04-18 | End: 2024-04-18

## 2024-04-18 RX ORDER — OXYCODONE HYDROCHLORIDE 5 MG/1
5 TABLET ORAL EVERY 4 HOURS PRN
Status: DISCONTINUED | OUTPATIENT
Start: 2024-04-18 | End: 2024-04-22 | Stop reason: HOSPADM

## 2024-04-18 RX ORDER — INSULIN LISPRO 100 [IU]/ML
0-5 INJECTION, SOLUTION INTRAVENOUS; SUBCUTANEOUS EVERY 4 HOURS
Status: DISCONTINUED | OUTPATIENT
Start: 2024-04-18 | End: 2024-04-19

## 2024-04-18 RX ORDER — DEXTROSE 50 % IN WATER (D50W) INTRAVENOUS SYRINGE
12.5
Status: DISCONTINUED | OUTPATIENT
Start: 2024-04-18 | End: 2024-04-22 | Stop reason: HOSPADM

## 2024-04-18 RX ADMIN — ATORVASTATIN CALCIUM 80 MG: 80 TABLET, FILM COATED ORAL at 21:51

## 2024-04-18 RX ADMIN — SODIUM CHLORIDE 75 ML/HR: 9 INJECTION, SOLUTION INTRAVENOUS at 01:48

## 2024-04-18 RX ADMIN — ACETAMINOPHEN 1000 MG: 10 INJECTION INTRAVENOUS at 03:53

## 2024-04-18 RX ADMIN — ENOXAPARIN SODIUM 30 MG: 100 INJECTION SUBCUTANEOUS at 08:50

## 2024-04-18 RX ADMIN — LEVETIRACETAM 1000 MG: 10 INJECTION INTRAVENOUS at 01:49

## 2024-04-18 RX ADMIN — LEVETIRACETAM 750 MG: 500 INJECTION, SOLUTION INTRAVENOUS at 14:02

## 2024-04-18 RX ADMIN — OXYCODONE HYDROCHLORIDE 5 MG: 5 TABLET ORAL at 18:57

## 2024-04-18 RX ADMIN — LEVETIRACETAM 1000 MG: 10 INJECTION INTRAVENOUS at 02:11

## 2024-04-18 RX ADMIN — MAGNESIUM SULFATE HEPTAHYDRATE 2 G: 40 INJECTION, SOLUTION INTRAVENOUS at 04:22

## 2024-04-18 RX ADMIN — ENOXAPARIN SODIUM 30 MG: 100 INJECTION SUBCUTANEOUS at 21:51

## 2024-04-18 RX ADMIN — SENNOSIDES AND DOCUSATE SODIUM 2 TABLET: 8.6; 5 TABLET ORAL at 21:51

## 2024-04-18 RX ADMIN — ACETAMINOPHEN 975 MG: 325 TABLET ORAL at 13:53

## 2024-04-18 SDOH — SOCIAL STABILITY: SOCIAL INSECURITY: DO YOU FEEL UNSAFE GOING BACK TO THE PLACE WHERE YOU ARE LIVING?: NO

## 2024-04-18 SDOH — HEALTH STABILITY: MENTAL HEALTH: HOW OFTEN DO YOU HAVE 6 OR MORE DRINKS ON ONE OCCASION?: PATIENT UNABLE TO ANSWER

## 2024-04-18 SDOH — HEALTH STABILITY: MENTAL HEALTH: HOW OFTEN DO YOU HAVE A DRINK CONTAINING ALCOHOL?: PATIENT UNABLE TO ANSWER

## 2024-04-18 SDOH — HEALTH STABILITY: MENTAL HEALTH: HOW MANY STANDARD DRINKS CONTAINING ALCOHOL DO YOU HAVE ON A TYPICAL DAY?: PATIENT UNABLE TO ANSWER

## 2024-04-18 SDOH — SOCIAL STABILITY: SOCIAL INSECURITY: HAVE YOU HAD THOUGHTS OF HARMING ANYONE ELSE?: NO

## 2024-04-18 SDOH — SOCIAL STABILITY: SOCIAL INSECURITY: WERE YOU ABLE TO COMPLETE ALL THE BEHAVIORAL HEALTH SCREENINGS?: YES

## 2024-04-18 SDOH — SOCIAL STABILITY: SOCIAL INSECURITY: DOES ANYONE TRY TO KEEP YOU FROM HAVING/CONTACTING OTHER FRIENDS OR DOING THINGS OUTSIDE YOUR HOME?: NO

## 2024-04-18 SDOH — SOCIAL STABILITY: SOCIAL INSECURITY: ABUSE: ADULT

## 2024-04-18 SDOH — SOCIAL STABILITY: SOCIAL INSECURITY: DO YOU FEEL ANYONE HAS EXPLOITED OR TAKEN ADVANTAGE OF YOU FINANCIALLY OR OF YOUR PERSONAL PROPERTY?: NO

## 2024-04-18 SDOH — SOCIAL STABILITY: SOCIAL INSECURITY: HAS ANYONE EVER THREATENED TO HURT YOUR FAMILY OR YOUR PETS?: NO

## 2024-04-18 SDOH — SOCIAL STABILITY: SOCIAL INSECURITY: ARE YOU OR HAVE YOU BEEN THREATENED OR ABUSED PHYSICALLY, EMOTIONALLY, OR SEXUALLY BY ANYONE?: NO

## 2024-04-18 SDOH — SOCIAL STABILITY: SOCIAL INSECURITY: ARE THERE ANY APPARENT SIGNS OF INJURIES/BEHAVIORS THAT COULD BE RELATED TO ABUSE/NEGLECT?: NO

## 2024-04-18 ASSESSMENT — PAIN DESCRIPTION - LOCATION
LOCATION: HEAD
LOCATION: HEAD

## 2024-04-18 ASSESSMENT — ACTIVITIES OF DAILY LIVING (ADL)
DRESSING YOURSELF: NEEDS ASSISTANCE
FEEDING YOURSELF: NEEDS ASSISTANCE
WALKS IN HOME: NEEDS ASSISTANCE
JUDGMENT_ADEQUATE_SAFELY_COMPLETE_DAILY_ACTIVITIES: YES
PATIENT'S MEMORY ADEQUATE TO SAFELY COMPLETE DAILY ACTIVITIES?: YES
HEARING - LEFT EAR: FUNCTIONAL
BATHING: NEEDS ASSISTANCE
GROOMING: NEEDS ASSISTANCE
ADEQUATE_TO_COMPLETE_ADL: YES
LACK_OF_TRANSPORTATION: PATIENT UNABLE TO ANSWER
TOILETING: NEEDS ASSISTANCE
HEARING - RIGHT EAR: FUNCTIONAL

## 2024-04-18 ASSESSMENT — PAIN SCALES - GENERAL
PAINLEVEL_OUTOF10: 0 - NO PAIN
PAINLEVEL_OUTOF10: 5 - MODERATE PAIN
PAINLEVEL_OUTOF10: 0 - NO PAIN
PAINLEVEL_OUTOF10: 5 - MODERATE PAIN
PAINLEVEL_OUTOF10: 0 - NO PAIN
PAINLEVEL_OUTOF10: 8

## 2024-04-18 ASSESSMENT — LIFESTYLE VARIABLES
AUDIT-C TOTAL SCORE: 0
SKIP TO QUESTIONS 9-10: 0
HOW MANY STANDARD DRINKS CONTAINING ALCOHOL DO YOU HAVE ON A TYPICAL DAY: PATIENT DOES NOT DRINK
SKIP TO QUESTIONS 9-10: 1
AUDIT-C TOTAL SCORE: -1
SKIP TO QUESTIONS 9-10: 0
AUDIT-C TOTAL SCORE: -1
HOW OFTEN DO YOU HAVE 6 OR MORE DRINKS ON ONE OCCASION: NEVER
HOW OFTEN DO YOU HAVE A DRINK CONTAINING ALCOHOL: NEVER
AUDIT-C TOTAL SCORE: 0

## 2024-04-18 ASSESSMENT — PAIN - FUNCTIONAL ASSESSMENT
PAIN_FUNCTIONAL_ASSESSMENT: 0-10

## 2024-04-18 ASSESSMENT — COGNITIVE AND FUNCTIONAL STATUS - GENERAL
PATIENT BASELINE BEDBOUND: NO
EATING MEALS: A LITTLE
MOBILITY SCORE: 18
PERSONAL GROOMING: A LITTLE
TURNING FROM BACK TO SIDE WHILE IN FLAT BAD: A LITTLE
TOILETING: A LITTLE
WALKING IN HOSPITAL ROOM: A LITTLE
MOVING FROM LYING ON BACK TO SITTING ON SIDE OF FLAT BED WITH BEDRAILS: A LITTLE
TURNING FROM BACK TO SIDE WHILE IN FLAT BAD: A LITTLE
MOVING TO AND FROM BED TO CHAIR: A LITTLE
DAILY ACTIVITIY SCORE: 18
DRESSING REGULAR UPPER BODY CLOTHING: A LITTLE
DRESSING REGULAR LOWER BODY CLOTHING: A LITTLE
MOBILITY SCORE: 16
MOVING TO AND FROM BED TO CHAIR: A LITTLE
STANDING UP FROM CHAIR USING ARMS: A LITTLE
STANDING UP FROM CHAIR USING ARMS: A LITTLE
HELP NEEDED FOR BATHING: A LITTLE
WALKING IN HOSPITAL ROOM: A LITTLE
CLIMB 3 TO 5 STEPS WITH RAILING: A LITTLE
CLIMB 3 TO 5 STEPS WITH RAILING: TOTAL
MOVING FROM LYING ON BACK TO SITTING ON SIDE OF FLAT BED WITH BEDRAILS: A LITTLE

## 2024-04-18 ASSESSMENT — COLUMBIA-SUICIDE SEVERITY RATING SCALE - C-SSRS
1. IN THE PAST MONTH, HAVE YOU WISHED YOU WERE DEAD OR WISHED YOU COULD GO TO SLEEP AND NOT WAKE UP?: NO
2. HAVE YOU ACTUALLY HAD ANY THOUGHTS OF KILLING YOURSELF?: NO
6. HAVE YOU EVER DONE ANYTHING, STARTED TO DO ANYTHING, OR PREPARED TO DO ANYTHING TO END YOUR LIFE?: NO

## 2024-04-18 NOTE — NURSING NOTE
Assumed care of patient. Patient is A&Ox4 and is resting in bed. Daughters at bedside. Patient is on room air and denies pain. Call light in reach. Patient is waiting to be transferred to [facility] for Neuro surgery.  Bed at [facility] was available and then cancelled due to patient needing ICU status and not SDU status. Patient will be transferred to ICU here for care and then will be transferred to [facility], once a bed is available  from our ICU unit.    Agree with prior assessment.

## 2024-04-18 NOTE — PROGRESS NOTES
Physical Therapy    Physical Therapy Evaluation    Patient Name: Marybeth Duggan  MRN: [id number]  Room: [unfilled]   Today's Date: 4/18/2024   Time Calculation  Start Time: 1315  Stop Time: 1335  Time Calculation (min): 20 min    Assessment/Plan   PT Assessment  PT Assessment Results: Impaired balance, Decreased coordination, Decreased cognition  Rehab Prognosis: Good  Barriers to Discharge: medical acuity  End of Session Communication: Bedside nurse  End of Session Patient Position: Bed, 3 rail up, Alarm on  IP OR SWING BED PT PLAN  Inpatient or Swing Bed: Inpatient  PT Plan  Treatment/Interventions: Bed mobility, Transfer training, Gait training, Stair training, Balance training, Strengthening, Endurance training, Range of motion, Therapeutic exercise, Therapeutic activity  PT Plan: Skilled PT  PT Frequency: 5 times per week  PT Discharge Recommendations: Low intensity level of continued care  Equipment Recommended upon Discharge:  (Owns necessary equipment)  PT Recommended Transfer Status: Assist x1, Assistive device  PT - OK to Discharge: Yes    Subjective     General Visit Information:  Subjective: Patient is alert, agreeable to PT.  Utilized martii cart  for croation language during evaluation.   Reason for Referral: Recent fall, pw aphasia, RUE weakness and possible sz activity, AMS  Past Medical History Relevant to Rehab: PMH of HTN, HLD, hypothyroidism and recent fall with traumatic L. SDH ( 4/7-4/10)  Prior to Session Communication: Bedside nurse  Patient Position Received: Bed, 3 rail up, Alarm on  Family/Caregiver Present: No   Home Living:  Home Living  Type of Home: House  Lives With:  (baseline alone, has been discharged to [facility] home)  Home Adaptive Equipment: Cane, Walker rolling or standard  Home Layout: One level  Home Access: Stairs to enter with rails  Entrance Stairs-Number of Steps: 4  Prior Level of Function:  Prior Function Per Pt/Caregiver Report  Level of [place]:  (baseline  independent c DANA morton, has required some ADL assistance since fall)  Receives Help From: Family (daughter Cale)  Precautions:  Precautions  Medical Precautions: Fall precautions, Seizure precautions  Vital Signs:  Vital Signs  Heart Rate: 57 (post 72)  SpO2: 96 % (post 99)  BP: 152/77 (155/77)  Medical Gas Therapy: None (Room air)    Lines/Tubes/Drains:  External Urinary Catheter Female (Active)   Number of days: 2     Continuous Medications/Drips:  sodium chloride 0.9%, 75 mL/hr, Last Rate: 75 mL/hr (04/18/24 1200)        Objective   Pain:  Pain Assessment  Pain Score: 5 - Moderate pain (post 5)  Pain Type: Acute pain  Pain Location: Head    Cognition:  Cognition  Orientation Level: Oriented X4 (oriented x 4)    General Assessments:  Extremity/Trunk Assessments:  Tone: No abnormalities noted  Sensation  Light Touch: No apparent deficits  Coordination  Movements are Fluid and Coordinated: Yes  Finger to Target: Intact  Upper Extremity  ROM: WFL  Strength:Not WFLRUE: GH Flexion 4/5, Elbow Flexion 4/5, Elbow Extension 4/5,  fair  LUE: GH Flexion 4/5, Elbow Flexion 4/5, Elbow Extension 4/5,  fair  Lower Extremity  ROM: WFL  Strength: Not WFL RLE: Hip flexion 3-/5, Knee flexion 4/5, Knee extension 4/5, PF 4/5, DF 4/5  LLE: Hip flexion 3-/5, Knee flexion 4/5, Knee extension 4/5, PF 4/5, DF 4/5   Sitting Static Balance Normal  Sitting Dynamic Balance Not NormalUE Support Two UE support and Assist Level Contact Guard  Standing Static Balance Not NormalUE Support Two UE support and Assist Level Contact Guard  Standing Dynamic Balance Not NormalUE Support Two UE support and Assist Level Contact Guard    Functional Assessments:  Bed Mobility  Bed Mobility: Yes  Bed Mobility 1  Bed Mobility 1: Supine to sitting  Level of Assistance 1: Minimum assistance  Bed Mobility Comments 1: HOB 30, TAPS  Bed Mobility 2  Bed Mobility  2: Sitting to supine  Level of Assistance 2: Minimum assistance  Bed Mobility Comments 2: LE  assist    Transfers  Transfer: Yes  Transfer 1  Transfer From 1: Sit to  Transfer to 1: Stand  Transfer Device 1: Walker  Transfer Level of Assistance 1: Contact guard, Minimal verbal cues  Transfers 2  Transfer From 2: Stand to  Transfer to 2: Sit  Transfer Device 2: Walker  Transfer Level of Assistance 2: Contact guard, Minimal verbal cues  Transfers 3  Transfer From 3: Bed to  Transfer to 3: Bed  Transfer Device 3: Walker  Transfer Level of Assistance 3: Contact guard, Minimal verbal cues    Ambulation/Gait Training  Ambulation/Gait Training Performed: Yes  Ambulation/Gait Training 1  Surface 1: Level tile  Device 1: Rolling walker  Assistance 1: Contact guard  Quality of Gait 1:  (decreased gait speed)  Comments/Distance (ft) 1: 25'              Outcome Measures:  [facility] Basic Mobility  Turning from your back to your side while in a flat bed without using bedrails: A little  Moving from lying on your back to sitting on the side of a flat bed without using bedrails: A little  Moving to and from bed to chair (including a wheelchair): A little  Standing up from a chair using your arms (e.g. wheelchair or bedside chair): A little  To walk in hospital room: A little  Climbing 3-5 steps with railing: Total  Basic Mobility - Total Score: 16                 [place]  Sitting Balance: Steady, safe  Arises: Able, uses arms to help  Attempts to Arise: Able to arise, one attempt  Immediate Standing Balance (First 5 Seconds): Steady but uses walker or other support  Standing Balance: Steady but wide stance, uses cane or other support  Nudged: Staggers, grabs, catches self  Eyes Closed: Steady  Turned 360 Degrees: Steadiness: Steady  Turned 360 Degrees: Continuity of Steps: Discontinuous steps  Sitting Down: Uses arms or not a smooth motion  Balance Score: 10  Initiation of Gait: No hesitancy  Step Height: R Swing Foot: Right foot complete clears floor  Step Length: R Swing Foot: Passes left stance foot  Step Height: L Swing  Foot: Left foot complete clears floor  Step Length: L Swing Foot: Passes right stance foot  Step Symmetry: Right and left step appear equal  Step Continuity: Steps appear continuous  Path: Mild/moderate deviation or uses walking aid  Trunk: Marked sway or uses walking aid  Walking Time: Heels almost touching while walking  Gait Score: 9  Total Score: 19          Encounter Problems       Encounter Problems (Active)       PT Problem       Patient will complete supine to sit and sit to supine Supervision  (Progressing)       Start:  04/18/24    Expected End:  05/02/24            Patient will perform sit<>stand transfer with LRAD, and Supervision  (Progressing)       Start:  04/18/24    Expected End:  05/02/24            Patient will ambulate >150' with LRAD and Supervision  (Progressing)       Start:  04/18/24    Expected End:  05/02/24            Patient will ascend/descend 4 steps with Right Rail Ascending and supervision  (Progressing)       Start:  04/18/24    Expected End:  05/02/24                 Assessment: Patient presents c possible sz like activity, AMS, and hx of recent fall.  Currently with impaired strength, balance, gait, and mobility impairments.  Patient would benefit from further therapy to increase functional independence and safety.  Will continue to follow during acute stay.      Education Documentation  Body Mechanics, taught by Arias Kirk PT at 4/18/2024  2:50 PM.  Learner: Patient  Readiness: Acceptance  Method: Explanation  Response: Needs Reinforcement    Mobility Training, taught by Arias Kirk PT at 4/18/2024  2:50 PM.  Learner: Patient  Readiness: Acceptance  Method: Explanation  Response: Needs Reinforcement    Education Comments  No comments found.          04/18/24 at 2:51 PM   Arias Kirk PT   Rehab Office: [phone]

## 2024-04-18 NOTE — CARE PLAN
The patient's goals for the shift include      The clinical goals for the shift include remain hemodynamically stable by end of shift.    Problem: Pain  Goal: My pain/discomfort is manageable  Outcome: Progressing     Problem: Safety  Goal: Patient will be injury free during hospitalization  Outcome: Progressing  Goal: I will remain free of falls  Outcome: Progressing     Problem: Daily Care  Goal: Daily care needs are met  Outcome: Progressing     Problem: Psychosocial Needs  Goal: Demonstrates ability to cope with hospitalization/illness  Outcome: Progressing  Goal: Collaborate with me, my family, and caregiver to identify my specific goals  Outcome: Progressing     Problem: Discharge Barriers  Goal: My discharge needs are met  Outcome: Progressing

## 2024-04-18 NOTE — SIGNIFICANT EVENT
Called by RN to come reevaluate the patient.  Dr. Price is patient's provider although currently nursing staff is unable to get a hold of him.  Apparently the patient has had multiple episodes throughout the day where she will become aphasic and then sometime later will be able to talk again.  Dayshift RN said patient had about a 2-hour episode of aphasia that ended seemingly around 8 PM.  I evaluated the patient around 2030 and at that time she was able to answer simple questions and follow commands. Mild  strength discrepancy with weakness on right hand. When asked to smile, patient's left lower face twitched, no true smile, no movement on R. She also has hematoma of scalp that has been spreading past margins.   She did not appear to have aphasia when I spoke to her.  The night nurse said that when she was evaluating her after shift change the patient was initially talking, had a few seconds of not being able to answer, and then was able to talk several minutes later.    Vitals trend shows lowering of blood pressure throughout the day. Most recently, systolics 100s.    Unfortunately patient's 2100 dose of lopressor was given early at 2015, prior to me being contacted about the patient. Lopressor now on hold.    Spoke with transfer center and the neurosurgeon.  Given concern the patient is possibly having seizures during these times of aphasia, he would like the patient to go to the ICU.  He recommends IV fluids at this time if no concerns for heart failure.  Will transfer to the ICU here while awaiting bed downtown for closer monitoring.  Discussed with the ICU nurse and the ICU PA.      CCT: 33 min

## 2024-04-18 NOTE — H&P
"[facility]  TRAUMA ICU - HISTORY & PHYSICAL    Patient Name: Marybeth Duggan  MRN: [id number]  Admit Date: 417  : 1945  AGE: 78 y.o.   GENDER: female  ==============================================================================   [###USE THIS SECTION FOR TRAUMA PATIENTS ONLY###]    INTERVAL HPI:  Marybeth Duggan is a 79 y/o primarily Cro[place]-speaking female who was recently admitted to the trauma service from  - 4/10 after a fall backwards from standing off 1 step and striking the left side of her head/face on the floor resulting in a traumatic left subdural hematoma and a superficial hematoma of her left scalp. She was neurologically intact throughout the course of her admission. CT Head imaging was stable. Evaluated by neurosurgery, non-operatively managed, recommended Keppra 500 mg BID x 7 days, holding home ASA until post-bleed day 14 (), and 2-week outpatient follow-up with repeat CT Head at that time. Patient was discharged home with home health care/family assistance on 4/10. On discharge, she was neurologically intact, and medically stable.     On 4/15, at 6 PM the patient's family first noted patient to have an episode of \"suddenly becoming unable to speak, with incoherent speech\" while talking on the phone, they reported she had 4 episodes of this, each were relatively short in duration, they also note that her right hand seemed weak and clumsy during these episodes. Family denies any new occurrences of falls since discharge. They report that she completed her 7-day course of Keppra on . They took her to [facility], [place], operated by [facility] for evaluation out of concern that her left subdural hematoma had worsened. She underwent CT imaging of her head, which showed her left subdural hematoma, was interpreted as stable compared to her most recent CT Head on . She was admitted for TIA/Ischemic Stroke work up. MRI Brain on  did not find any evidence of acute " infarct. Carotid Ultrasound on 4/17 did not find any hemodynamically significant stenosis. On 4/17, nursing team reported that patient was having multiple episodes of becoming aphasic with mild right sided weakness and mild facial droop, these episodes were becoming more frequent and longer in duration. The longest duration reported was between 2 hours (per RN) vs 5 hours (per patient's daughters). The team at [facility] became concerned that the patient could be having underlying seizure activity and transfer was arranged to return to [facility] trauma service for higher level resource needs and continuity of care.    INITIAL MECHANISM OF INJURY:   Fall backwards from 1 step, striking left side of head/face on floor that occurred on 4/7/2024.  LOC (yes/no?): No  Anticoagulant / Anti-platelet Rx? (for what dx?): ASA (Hx of CAD)  Referring Facility Name (N/A for scene EMR run): [facility], [place], operated by [facility]    INJURIES:   Recent traumatic left subdural hematoma, 7 mm thickness, with 5 mm left-to-right midline shift 2/2 fall backwards off 1 step (hospitalized on trauma service from 4/7 - 4/10), stable with slight interval improvement  Residual Superficial Hematoma of Left Scalp related to above    ACUTE MEDICAL PROBLEMS:  Episodes of Expressive Aphasia with associated right upper extremity weakness/clumsiness concerning for seizure activity    CHRONIC MEDICAL PROBLEMS:  Hypertension  Hyperlipidemia  Hypothyroidism  Coronary Artery Disease (on ASA 81 mg for cardioprotection)  Urinary Incontinence  Anxiety  Osteoarthritis    INCIDENTAL FINDINGS:  none    PROCEDURES:      ==============================================================================  TODAY'S ASSESSMENT AND PLAN OF CARE:  Marybeth Duggan is a 78 y.o. female in the ICU due to: multiple episodes of severe expressive vs global aphasia with associated right hand weakness concerning for underlying seizure activity and requires close neurological  monitoring    NEURO/PAIN/SEDATION:   # Recent Traumatic Left Subdural Hematoma, stable with slight interval improvement   - CT Head (4/17): Stable L SDH with slight interval improvement, 5 mm thickness, 4 mm rightward midline shift   - CT Head (4/15): Stable L SDH, 7 mm thickness, 4 mm rightward midline shift compared to CT Head on 4/8    - Neurosurgery recs:      -> No neurosurgical intervention indicated at this time      -> Okay for pharmacological DVT prophylaxis and ASA if indicated      -> Recommend Neurology consult  # Concern for Seizure Activity, multiple brief episodes of expressive aphasia, and mild to moderate RUE weakness/clumsiness   - Underwent TIA/Ischemic Stroke workup at [facility]       -> MRI Brain wo (4/16/24): No acute infarct. Stable L SDH       -> Bilateral CUS (4/17/24): Bilateral carotid/vertebral arteries are patent without evidence of hemodynamically significant stenosis, < 50% stenosis   - Neuro checks Q1H until cEEG monitoring is placed   - Seizure precautions   - STAT cEEG monitoring   - Neurology recs:      -> agree with cEEG monitoring      -> LEV 2 gram IV load, then 750 mg BID maintenance      -> Encephalopathy work up: Vit B12 levels, Thiamine levels, Ammonia, (TSH [4/17]: 3.89 = WNL), UA with reflex, and VBG    RESPIRATORY:   # No acute respiratory concerns at this time   - SpO2 > 92%, currently on room air   - Incentive Spirometry Q1H while awake    CARDIOVASC:   # No acute hemodynamic concerns at this time   - ICU telemetry monitoring   - SBP  mmHg, MAP > 65  # History of HTN, HLD, and CAD (on ASA for cardioprotection)   - HOLDING Home Meds at this time until speech eval      -> Metoprolol 50 mg BID      -> Atorvastatin 80 mg HS      -> Aspirin 81 mg, initially recommended by Neurosurgery to be held until post bleed day 14 ~ (4/21)   - While NPO, will have order for Metoprolol 5 mg IVP Q6H PRN for HR > 100, hold for SBP < 100.    GI:   # Strict NPO at this time d/t episodes  of severe expressive aphasia   - Speech eval consult ordered for AM  for formal evaluation of swallowing and speech-language function    /FEN:   # No concern for JOSEY or chronic renal disease   - Trend BUN/Cr daily   - Monitor Urine Output for goal > 0.5 ml/kg/hr   - 0.9%NS @ 75 ml/hr maintenance IVF while NPO     HEMATOLOGIC:   # No concern for acute blood loss anemia at this time  # No concern for coagulopathy at this time   - Daily CBC    ENDOCRINE:   # Glycemic control, adequate at this time   - Glucose < 180   - SSI#1 Lispro Q4H while NPO  # History of Hypothyroidism   - HOLD home Synthroid 88 mcg daily, pending SLP eval    MUSCULOSKELETAL/SKIN:   - PT/OT Consults    INFECTIOUS DISEASE:   # No infectious concerns at this time   - Trend WBC daily   - Monitor for SIRS response     GI PROPHYLAXIS: None    DVT PROPHYLAXIS: SCDs + Lovenox    DISPOSITION: TSICU    Patient seen by and plan of care discussed with trauma attending Dr. Nance.      60 minutes of critical care time billed for patient assessment, repeat neurological exams, consultations with neurosurgery and neurology, review of patient's recent medical history, imaging and labs, and plan of care discussion/education with patient/family (patient's daughters). Patient considered critical due to concern for possible seizure activity requiring close neurological monitoring with frequent neuro checks and cEEG monitoring.    Rancho Barnhart, DEANDRE-CNP, ACN-  Trauma Surgical ICU  #[id number]  ==============================================================================  CHIEF COMPLAINT / OVERNIGHT EVENTS / HPI:   Admitted to TSICU as a transfer from [facility] for concern for seizure activity. Initially arrived with completely intact neurological exam. Then had sudden onset of severe expressive aphasia (incoherent speech), unclear if able to respond to commands vs mimicking, slight right hand weakness appreciated on repeat neurological exam. Neurosurgery and Neurology  consults placed. cEEG monitoring ordered. Keppra 2 gram IV load given, followed by 750 BID for maintenance.    MEDICAL HISTORY / ROS:  Admission history and ROS reviewed. Pertinent changes as follows:   - Complaint of mild left sided headache  - no other subjective complaints    PHYSICAL EXAM:  Heart Rate:  [63-76]   Temp:  [36.2 °C (97.1 °F)-37 °C (98.6 °F)]   Resp:  [15-28]   BP: ()/()   Weight:  [97.1 kg (214 lb 1.1 oz)]   SpO2:  [93 %-98 %]   Physical Exam  Constitutional:       General: She is not in acute distress.     Appearance: Normal appearance. She is obese. She is not ill-appearing, toxic-appearing or diaphoretic.   HENT:      Head: Normocephalic.      Comments: Large area of ecchymosis on left side of head/face from superficial scalp hematoma from initial fall on 4/7     Right Ear: External ear normal.      Left Ear: External ear normal.      Nose: Nose normal.      Comments: No tenderness, stable to palpation     Mouth/Throat:      Mouth: Mucous membranes are moist.      Pharynx: Oropharynx is clear.      Comments: Edentulous   Eyes:      Extraocular Movements: Extraocular movements intact.      Conjunctiva/sclera: Conjunctivae normal.      Pupils: Pupils are equal, round, and reactive to light.   Cardiovascular:      Rate and Rhythm: Normal rate and regular rhythm.      Pulses: Normal pulses.      Heart sounds: Normal heart sounds. No murmur heard.     No friction rub. No gallop.   Pulmonary:      Effort: Pulmonary effort is normal. No respiratory distress.      Breath sounds: Normal breath sounds. No stridor. No wheezing, rhonchi or rales.   Chest:      Chest wall: No tenderness.   Abdominal:      General: Bowel sounds are normal. There is no distension.      Palpations: Abdomen is soft. There is no mass.      Tenderness: There is no abdominal tenderness. There is no right CVA tenderness, left CVA tenderness, guarding or rebound.      Hernia: No hernia is present.   Genitourinary:      General: Normal vulva.   Musculoskeletal:         General: No swelling, tenderness, deformity or signs of injury. Normal range of motion.      Cervical back: Normal range of motion and neck supple. No tenderness.      Right lower leg: No edema.      Left lower leg: No edema.      Comments: Moves all extremities x 4, antigravity   Skin:     General: Skin is warm and dry.      Capillary Refill: Capillary refill takes less than 2 seconds.      Findings: No lesion or rash.   Neurological:      Mental Status: She is alert.      Cranial Nerves: No cranial nerve deficit.      Sensory: No sensory deficit.      Comments: Initial neuro exam on arrival: GCS: 15, AO to self, location, year, situation, PERRL, 5/5 motor strength in all extremities, speech fluent, intact to naming & repetition, no dysarthria, facial droop, or drift, CN II-XII intact    Neuro exam with symptomatic onset 30-40 minutes after arrival:  GCS: 12 (E4/V2/M6), Alert, unintelligible/garbled speech, follows commands with mimicking, mild RUE weakness with hand squeeze 4/5, CN II-XII remain intact, pupils midline, PERRL, SILT         IMAGING SUMMARY:  (summary of new imaging findings, not a copy of dictation)    CT Head (4/17): Stable L SDH with slight interval improvement, 5 mm thickness, 4 mm rightward midline shift    Bilateral CUS (4/17/24): Bilateral carotid/vertebral arteries are patent without evidence of hemodynamically significant stenosis, < 50% stenosis    MRI Brain wo (4/16/24): No acute infarct. Stable L SDH    CT Head (4/15): Stable L SDH, 7 mm thickness, 4 mm rightward midline shift compared to CT Head on 4/8      LABS:  Results from last 7 days   Lab Units 04/17/24  0506 04/15/24  2111   WBC AUTO x10*3/uL 9.0 10.6   HEMOGLOBIN g/dL 12.0 12.7   HEMATOCRIT % 36.2 37.8   PLATELETS AUTO x10*3/uL 312 309   NEUTROS PCT AUTO %  --  66.0   LYMPHS PCT AUTO %  --  24.1   MONOS PCT AUTO %  --  7.2   EOS PCT AUTO %  --  1.9     Results from last 7 days    Lab Units 04/15/24  2111   APTT seconds 24.8   INR  1.0     Results from last 7 days   Lab Units 04/17/24  0506 04/15/24  2111   SODIUM mmol/L 138 137   POTASSIUM mmol/L 4.1 4.3   CHLORIDE mmol/L 102 100   CO2 mmol/L 25 24   BUN mg/dL 17 19   CREATININE mg/dL 0.70 0.80   CALCIUM mg/dL 8.8 9.2   PROTEIN TOTAL g/dL  --  7.5   BILIRUBIN TOTAL mg/dL  --  0.6   ALK PHOS U/L  --  121   ALT U/L  --  17   AST U/L  --  18   GLUCOSE mg/dL 116* 102*     Results from last 7 days   Lab Units 04/15/24  2111   BILIRUBIN TOTAL mg/dL 0.6         I have reviewed all medications, laboratory results, and imaging pertinent for today's encounter.

## 2024-04-18 NOTE — SIGNIFICANT EVENT
"Preliminary EEG Report of baseline (first 25 minutes of EEG)    This vEEG is indicative of a left sided structural lesion. No seizures are seen.    This EEG was read up until 0344 on 04/18/24.  This is only a preliminary read of the first 25 minutes (baseline) of the EEG.   Please see the full report in the EEG database and \"Media\" tab tomorrow.    When ready, to discontinue the vEEG, please place an order to \"discontinue continuous video EEG.\"     Jessica Sanchez, DO  Epilepsy Fellow    "

## 2024-04-18 NOTE — CONSULTS
Consults    History Of Present Illness  Marybeth Duggan is a 78 y.o. female with PMH of HTN, HLD, hypothyroidism and recent fall with traumatic L. SDH ( 4/7-4/10) presenting a transfer from [facility] with new onset intermittent aphasia and right hand weakness concerning for seizure.     Patient initially presented on 4/7 - after she sustained a fall from 1 step backwards striking the left side of her head. She was found to have a SDH with 5 mm midline shift and a large hematoma over the left lateral scalp with an overlying abrasion. Pt was followed by neurosurgery and was found to be at neurologic baseline in the setting of the traumatic SDH. Patient was discharged to home with Keppra 500mg IV and plan to repeat a CT head in 2 weeks.     Per daughter (Elisabeth), patient was in her usual state of health till Monday. On Monday, patient was on a phone call with a friend when she could not get her words out.  She reported this to her daughter, after few minutes patient had another episode of inability to speak after which daughter brought patient to the ED at the request.     During her hospital stay from 4/15 - 4/17, daughter reports that patient had increasing frequency of episodes where she could not speak, with intermittent right facial and right upper extremity numbness and weakness.  Per both daughters at bedside, between these intermittent episodes patient did return to baseline and was fully conversational with complete strength and no numbness.    CT head and MRI brain were done at [facility] and showed improving scalp and subdural hematoma and no evidence of new ischemic infarcts (see imaging below).   Per daughters, episodes of transient aphasia, right face and right arm weakness initially last just for a few minutes after MRI on 4/16, patient would go on for hours without speaking normally.   At time of interview patient was nonverbal and unable to participate in interview.    Daughters denies previous  "neurosurgical intervention, history of stroke or previous history of seizures.     Per IM note by Dr. Gonsales on 4/17 at [facility]:  \"Apparently the patient has had multiple episodes throughout the day where she will become aphasic and then sometime later will be able to talk again.  Dayshift RN said patient had about a 2-hour episode of aphasia that ended seemingly around 8 PM.  I evaluated the patient around 2030 and at that time she was able to answer simple questions and follow commands. Mild  strength discrepancy with weakness on right hand. When asked to smile, patient's left lower face twitched, no true smile, no movement on R. She also has hematoma of scalp that has been spreading past margins.   She did not appear to have aphasia when I spoke to her.  The night nurse said that when she was evaluating her after shift change the patient was initially talking, had a few seconds of not being able to answer, and then was able to talk several minutes later. \"    Imaging:  Original CTH from trauma 4/7/24 shows large scalp and small subdural hyperintensity of the lateral frontotemporal region causing 4-5mm of MLS.     Repeat image from 4/17/24 demonstrates mixed iso-hyperdensity of the same subdural blood with improvement in MLS, characteristic and consistent with re-absorbing subdural hemorrhage.     MRI w/o from 4/16/24 demonstrates expected diffusion restricting and GRE-hypointense fluid of the subdural and subcutaneous spaces. The fluid is hyperintense on T1 & T2, confirming subacute/early chronic time course. There is additionally moderate subcortical T2/FLAIR hyperintensity and a 3x5mm T1/T2 hypointense anterior parietal corona radiata lesion favored to represent chronic microvascular disease and chronic lacunar infarct with gliosis, respectively.      Epilepsy Risk Factors: Left scalp and subdural hematoma, traumatic fall  Previous EEG results: None    Past Medical History  Past Medical History: "   Diagnosis Date    Bilateral shoulder pain     Personal history of other diseases of the circulatory system 08/08/2013    History of hypertension    Personal history of other mental and behavioral disorders 08/08/2013    History of depression    Pure hypercholesterolemia, unspecified 09/19/2013    Low-density-lipoid-type (LDL) hyperlipoproteinemia    Thyroid condition      Surgical History  Past Surgical History:   Procedure Laterality Date    GALLBLADDER SURGERY  11/30/2016    Gallbladder Surgery    OTHER SURGICAL HISTORY  10/22/2019    Hip replacement    OTHER SURGICAL HISTORY  10/22/2019    Thyroidectomy total     Social History  Social History     Tobacco Use    Smoking status: Never    Smokeless tobacco: Never   Vaping Use    Vaping status: Never Used   Substance Use Topics    Alcohol use: Yes     Comment: occasional    Drug use: Never     Allergies  Patient has no known allergies.  Medications Prior to Admission   Medication Sig Dispense Refill Last Dose    acetaminophen (Tylenol) 325 mg tablet Take 2 by mouth every 6 hours for 5 days. 40 tablet 0     atorvastatin (Lipitor) 80 mg tablet TAKE 1 TABLET BY MOUTH AT  BEDTIME 100 tablet 2     bacitracin 500 unit/gram ointment Apply topically 3 times a day 28.4 g 0     cholecalciferol (Vitamin D-3) 25 MCG (1000 UT) tablet Take 1 tablet (25 mcg) by mouth once daily in the morning.       levothyroxine (Synthroid, Levoxyl) 88 mcg tablet Take 1 tablet (88 mcg) by mouth once daily. (Patient taking differently: Take 1 tablet (88 mcg) by mouth once daily in the morning. Take before meals.) 100 tablet 1     metoprolol tartrate (Lopressor) 50 mg tablet Take 1 tablet by mouth 2 times a day. 30 tablet 0     PARoxetine (Paxil) 20 mg tablet Take 1 tablet (20 mg) by mouth once daily. (Patient taking differently: Take 1 tablet (20 mg) by mouth once daily in the morning.) 90 tablet 1        Review of Systems  Neurological Exam  Physical Exam  Last Recorded Vitals  Blood pressure  119/75, pulse 65, temperature 36.2 °C (97.1 °F), temperature source Temporal, resp. rate 21, SpO2 98%.    Relevant Results    Neurological Exam:  MENTAL STATUS:  General appearance: No distress, alert, nonverbal  Orientation: Not oriented was able to see hospital with multiple prompts and choices  Language: Severe receptive and expressive aphasia  Was able to mimic actions but did not follow any verbal commands    CRANIAL NERVES:  - II: Blink to threat on both sides  - III, IV, VI: DUANE, EOMI to pursuit without nystagmus, no gaze preference  - V: V1-V3 unable to test due to lack of patient understanding  - VII: Face muscles symmetric with smile    MOTOR: Tone and bulk normal in all extremities  5 out of 5 strength in bilateral upper and lower extremities on elbow flexion, elbow extension, hand , hip flexion, hip extension and, leg extension, foot dorsiflexion bilaterally    REFLEXES:  R  L  Biceps   2  2  Triceps   2  2  Brachioradialis  2  2  Patellar   2  2  Achilles 2  2  Plantar  Down Down    No Hooper, crossed adductor, Babinski, or clonus    SENSORY: Grimace to painful stimuli in all 4 extremities                Assessment/Plan   Principal Problem:    Fluctuating mental status  Marybeth Duggan is a 78 y.o. female with PMH of HTN, HLD, hypothyroidism and recent fall with traumatic L. SDH ( 4/7-4/10) presenting a transfer from [facility] with new onset intermittent aphasia and right hand weakness concerning for seizure.  Exam significant for severe global aphasia and intact strength/sensation.  Head and MRI imaging show improvement in SDH and MLS with no other acute change.  Patient presentation of intermittent aphasia right arm and right facial weakness and numbness concerning for a focal seizure.  Epilepsy will continue to follow.      Recommendations:  - Please order continuous video EEG  - Please load with 2g IV Keppra  -Increase maintenance dose to 750 mg Keppra IV mg every 12 hours  -Please draw Keppra  levels right before p.m. dose (needs to be within 30 minutes of next dose for a true trough level)  -Please ensure seizure and fall precautions  -Please instruct nursing to pamella EEG when placed if there is concern for a seizure event  -Please reserve 2 mg IV Ativan for generalized tonic-clonic seizures lasting for more than 5 minutes or for 2 or more seizures in a 15-minutes without return to baseline (can use up to 2 doses in 15 minutes).   -[phone] if any questions or concerns        I spent 60 minutes in the professional and overall care of this patient.    Staffed with attending Dr. Borja in a.m.  Maureen Lambert MD  Neurology PGY 2

## 2024-04-18 NOTE — SIGNIFICANT EVENT
Neurosurgery will sign off at this time. Imaging shows improved SDH and MLS compared to previous and exam is likely secondary to another etiology. Can keep scheduled neurosurgical follow up. Rest per primary team and consulting teams. Please page [phone] with any questions or concerns.

## 2024-04-18 NOTE — PROGRESS NOTES
Marybeth Duggan is a 78 y.o. female on day 1 of admission presenting with Fluctuating mental status.    Patient admitted to the trauma service from 4/7 - 4/10 after fall. Patient discharge to [facility] home for additional supports as patient lived alone prior to fall. On 4/15, family noted incoherent speech and mild facial droop. Patient presented at [facility] [place], later transferred to [facility] for trauma eval. Patient pending speech eval. SW will continue to follow for assistance with dc planning needs.

## 2024-04-18 NOTE — CONSULTS
Inpatient consult to Neurosurgery  Consult performed by: Neal Cruz MD  Consult ordered by: Israel Hernandez DO          Reason For Consult  SDH    History Of Present Illness  Marybeth Duggan is a 78 y.o. female h/o HTN, HLD, hypothyroid, CAD (was on ASA), dizziness, urinary incontinence, 4/7 p/w syncopal fall, CTH L convexity acute SDH, rCTH stable, 4/10 discharged home, p/w  aphasia, dysarthria, staring spells, R sided weakness 4/16 MRI stable SDH, no acute infarct, CTH improved SDH & MLS.  Neurosurgery is consulted for evaluation of the patient in the setting of new onset episodic aphasia, dysarthria, confusion, and right-sided weakness.    Patient's daughter were at bedside.  Patient was aphasic/dysarthric.  Patient's daughter stated that she has been having episodic aphasia/dysarthria and some staring spells since Monday.  She was evaluated at the [facility] for possible stroke/TIA or worsening of the subdural hematoma.  Imaging obtained at [facility] shows no acute infarct and stable to improved subdural hematoma.  They also state that patient was on Keppra until Sunday.  Has not received Keppra since then.  They think that she the symptoms has started since he stopped taking Keppra.    Patient was previously on aspirin 81 for cardioprotection in the setting of CAD.  Per chart review, patient has not been on aspirin since last admission.    Of note, patient was at the [facility] for SDH and was discharged home on 4/10 with improved exam.    Imaging is personally reviewed and CTh is notable for improved SDH and midline shift.  MRI from 4/16/2024 is negative for acute infarct.     Past Medical History  She has a past medical history of Bilateral shoulder pain, Personal history of other diseases of the circulatory system (08/08/2013), Personal history of other mental and behavioral disorders (08/08/2013), Pure hypercholesterolemia, unspecified (09/19/2013), and Thyroid condition.    Surgical History  She has a past  surgical history that includes Other surgical history (10/22/2019); Other surgical history (10/22/2019); and Gallbladder surgery (11/30/2016).     Social History  She reports that she has never smoked. She has never used smokeless tobacco. She reports current alcohol use. She reports that she does not use drugs.    Family History  No family history on file.     Allergies  Patient has no known allergies.    Review of Systems   As listed above     Physical Exam  On room air  NAD  Awake, Ox1  OU3R, has slight L FD  Minimally verbal, does mumbles unintelligible words   Fcx4 >AG    Last Recorded Vitals  Blood pressure 119/75, pulse 65, temperature 36.2 °C (97.1 °F), temperature source Temporal, resp. rate 21, SpO2 98%.    Relevant Results  Results for orders placed or performed during the hospital encounter of 04/15/24 (from the past 24 hour(s))   CBC   Result Value Ref Range    WBC 9.0 4.4 - 11.3 x10*3/uL    nRBC 0.0 0.0 - 0.0 /100 WBCs    RBC 3.74 (L) 4.00 - 5.20 x10*6/uL    Hemoglobin 12.0 12.0 - 16.0 g/dL    Hematocrit 36.2 36.0 - 46.0 %    MCV 97 80 - 100 fL    MCH 32.1 26.0 - 34.0 pg    MCHC 33.1 32.0 - 36.0 g/dL    RDW 12.2 11.5 - 14.5 %    Platelets 312 150 - 450 x10*3/uL   Basic Metabolic Panel   Result Value Ref Range    Glucose 116 (H) 65 - 99 mg/dL    Sodium 138 133 - 145 mmol/L    Potassium 4.1 3.4 - 5.1 mmol/L    Chloride 102 97 - 107 mmol/L    Bicarbonate 25 24 - 31 mmol/L    Urea Nitrogen 17 8 - 25 mg/dL    Creatinine 0.70 0.40 - 1.60 mg/dL    eGFR 89 >60 mL/min/1.73m*2    Calcium 8.8 8.5 - 10.4 mg/dL    Anion Gap 11 <=19 mmol/L   Thyroid Stimulating Hormone   Result Value Ref Range    Thyroid Stimulating Hormone 3.89 0.27 - 4.20 mIU/L   Lipid Panel   Result Value Ref Range    Cholesterol 133 133 - 200 mg/dL    HDL-Cholesterol 43.0 (L) >50.0 mg/dL    Cholesterol/HDL Ratio 3.1 SEE COMMENT    LDL Calculated 68 65 - 130 mg/dL    Triglycerides 108 40 - 150 mg/dL   Carotid duplex bilateral   Result Value Ref  Range    BSA 2.08 m2   POCT GLUCOSE   Result Value Ref Range    POCT Glucose 152 (H) 74 - 99 mg/dL          Assessment/Plan     Marybeth Duggan is a 78 y.o. female h/o HTN, HLD, hypothyroid, CAD (was on ASA), dizziness, urinary incontinence, 4/7 p/w syncopal fall, CTH L convexity acute SDH, rCTH stable, 4/10 discharged home, p/w  aphasia, dysarthria, staring spells, R sided weakness 4/16 MRI stable SDH, no acute infarct, CTH improved SDH & MLS.  Neurosurgery is consulted for evaluation of the patient in the setting of new onset episodic aphasia, dysarthria, confusion, and right-sided weakness.    Patient neurological exam is not at the baseline (last exam during prior to discharge during her last admission).  CT head shows improved subdural hematoma and midline shift.  Patient's changing neurological exam is is likely from seizure, especially in the setting of symptom onset after last dose of Keppra.     Recs  Recommend neurology consult  Recommend video EEG  Keppra   Encephalopathy work up if deemed necessary  No acute neurosurgical intervention needed  Okay for DVT prophylaxis    Patient is discussed with chief resident.  Note is not final until signed by attending physician.    MD Neal Puga MD

## 2024-04-18 NOTE — PROGRESS NOTES
Speech-Language Pathology  Adult Inpatient Clinical Bedside Swallow Evaluation  Cognitive Language Evaluation    Patient Name: Marybeth Duggan  MRN: [id number]  Today's Date: 4/18/2024   Start Time: 1100  Stop Time: 1130  Time Calculation (min): 300    History of Present Illness:   78 y.o. female with PMH of HTN, HLD, hypothyroidism and recent fall with traumatic L. SDH ( 4/7-4/10) presenting a transfer from [facility] with new onset intermittent aphasia and right hand weakness concerning for seizure.  Exam significant for severe global aphasia and intact strength/sensation.  Head and MRI imaging show improvement in SDH and MLS with no other acute change.  Patient presentation of intermittent aphasia right arm and right facial weakness and numbness concerning for a focal seizure.      No seizure seen on initial review of EEG.  Intermittent aphasia in pt with known prior left SDH, c/f seizure, possible post-ictal aphasia.    Assessment:   Clinical bedside swallow evaluation completed.  Family at bedside providing history, as patient's first language is Greenlandic.  Occasional interpretation from family during interview, virtual  used later in session.    Patient following simple commands for oral motor assessment.  Structure/function WFL for speech and swallow.  Edentulous uppers, limited teeth lowers.  Family reports she has dentures but has not worn them for the past 2 weeks and are ill fitting.  She has been able to eat all textures without them.  Speech is slightly slurred per family.      Patient independently feeding, tolerating ice chips, thin liquids by tsp and straw sip without s/s of aspiration.  3oz water test completed without difficulty.  Consumed 100% applesauce cup.  Mildly increased mastication time with regular solids, no overt s/s of aspiration throughout duration of evaluation.  Mild oral stasis after regular solids, cleared with thin liquid wash.      Patient is safe to initiate oral diet as  outlined below.     Orders from MD requesting swallow as well as speech/language evaluation.  Due to language and cultural barriers, to ensure appropriate insight to performance would require [place] speaking SLP.  To assess basic receptive/expressive language as family voiced concerns of her ability to express herself, a virtual  was used.     Patient was observed to have decreased attention, requiring repetition of instructions/questions.  Patient required additional time for processing and delayed initiation of task/ increased response time.  Followed 1 and 2 step directions.  Oriented to person, place, month, and year.  Identified daughter and granddaughter at bedside and stated names.  Provided biographical information.      Automatic speech   Counting 1-21 completed skipping #12 per family report  Days of week completed without difficulty (delayed initiation of task)    Attempted simple responsive naming questions; however family reports  was also using some Faroese words and patient was not fully understanding the questions.   Session then stopped.     Education provided to family regarding patient imaging showing improvement with size of SDH, which, as it continues to improve will likely reflect in her communication.      Will continue to follow patient.  Recommend 1 additional visit swallow and ongoing treatment for cognitive communication.          Recommendations:  Regular solids  Thin liquids    Goals:   Patient will tolerate safest and least restrictive diet 100% of the time without clinical s/s of aspiration during length of stay  Patient will 2 step commands without requiring repetition with 50% accuracy during length of stay  Patient will initiate task/response within 5 seconds of instruction/question in 4/5 opportunities       Plan:  SLP Services Indicated: Yes  Frequency: 2x week  Discussed POC with patient, daughter, and granddaughter  SLP - OK to Discharge    Pain:    0-10  0 = No pain.     Inpatient Education:  Extensive education provided to patient, daughter, and granddaughter  regarding current swallow function, recommendations/results, and POC.      Consultations/Referrals/Coordination of Services:   N/A

## 2024-04-18 NOTE — SIGNIFICANT EVENT
Pt was transferred to the ICU for monitoring pending bed placement/transfer to [facility].  Pt currently has bed placement at this time, and is scheduled for critical care transport at 2305.      Pt is currently A&O x3 responding to questions appropriately.  Pt is mildly hard of hearing and also Luxembourgish speaking.  Pt two daughters are bedside and able to speak more with the patient.  Pt strength is equal bilaterally in the upper and lower extremities, no drift noted in any extremity.  Daughters have noted slowness to her speech from baseline but deny any slurred speech at this time.  Pt was unable to speak for a period of time earlier in the day, leading to increased concern.  Nursing attempted to contact Dr. Price with no response.  Hospitalist Dr. Gonsales was contacted by nursing.  Dr. Gonsales evaluated the pt and contacted neurosurgery with plan for pt to be transferred downtown.  Would like the pt in the ICU for closer monitoring pending transfer.     Continue neuro checks q1h prior to transfer.

## 2024-04-18 NOTE — SIGNIFICANT EVENT
Epilepsy Post-Rounding Updates:    Marybeth Duggan is a 78 y.o. female with PMH of HTN, HLD, hypothyroidism and recent fall with traumatic L. SDH ( 4/7-4/10) presenting a transfer from [facility] with new onset intermittent aphasia and right hand weakness concerning for seizure.  Exam significant for severe global aphasia and intact strength/sensation.  Head and MRI imaging show improvement in SDH and MLS with no other acute change.  Patient presentation of intermittent aphasia right arm and right facial weakness and numbness concerning for a focal seizure.     No seizure seen on initial review of EEG.    Impression: Intermittent aphasia in pt with known prior left SDH, c/f seizure, possible post-ictal aphasia.    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 dose tomorrow (needs to be within 30 minutes of next dose for a true trough level)  -Please ensure seizure and fall precautions  -Please instruct nursing to pamella EEG when placed if there is concern for a seizure event  -Please reserve 2 mg IV Ativan for generalized tonic-clonic seizures lasting for more than 5 minutes or for 2 or more seizures in a 15-minutes without return to baseline (can use up to 2 doses in 15 minutes).   -[place] [phone] if any questions or concerns   -Epilepsy will continue to follow    Skyler Bryant MD  Neurology, PGY-2

## 2024-04-18 NOTE — PROGRESS NOTES
[facility]  TRAUMA ICU - PROGRESS NOTE    Patient Name: Marybeth Duggan  MRN: [id number]  Admit Date: 417  : 1945  AGE: 78 y.o.   GENDER: female  ==============================================================================  MECHANISM OF INJURY:   Marybeth Duggan is a 77 y/o primarily Cro[place]-speaking female who was recently admitted to the trauma service from  - 4/10 after a fall backwards from standing off 1 step and subsequent left SDH. Pt was discharged in stable condition. Family reports episodes of expressive aphasia and right side weakness that started 4/15. [facility] .  Patient was transferred to [facility] [place] for higher level of care and vEEG.    LOC (yes/no?): No  Anticoagulant / Anti-platelet Rx? (for what dx?): ASA (hx of CAD)  Referring Facility Name (N/A for scene EMR run): [facility]    INJURIES:   Recent traumatic left subdural hematoma, 7 mm thickness, with 5 mm left-to-right midline shift 2/2 fall backwards off 1 step (hospitalized on trauma service from  - 4/10), stable with slight interval improvement  Residual Superficial Hematoma of Left Scalp related to above    OTHER MEDICAL PROBLEMS:  Hypertension  Hyperlipidemia  Hypothyroidism  Coronary Artery Disease (on ASA 81 mg for cardioprotection)  Urinary Incontinence  Anxiety  Osteoarthritis    INCIDENTAL FINDINGS:  None    PROCEDURES:      ==============================================================================  TODAY'S ASSESSMENT AND PLAN OF CARE:  Marybeth Duggan is a 78 y.o. female in the ICU due to: Frequent Neuro checks    Updated Plan Post Rounds:    NEURO/PAIN/SEDATION:     Traumatic SDH  - CT head  stable, no need for further imaging per NSGY   -> DVT Proph can be resumed, will hold ASA until  (14 days post bleed)   -> No neurosurgical interventions indicated   -> Tylenol PRN for headaches and pain    TIA vs Stroke  -  "Neurology/Epilepsy consulted   -> Continue Keppra 750 BID    -> Continue vEEG at this time    RESPIRATORY:   - No active issues  - SpO2 > 92%, currently on room air  - Incentive Spirometry Q1H while awake    CARDIOVASC:   - ICU telemetry monitoring  - SBP  mmHg, MAP > 65  - Will resume metoprolol with hold parameters  - Restarting Atorvastatin 80mg daily    GI:   - Reg diet  - BR  - No active issues    :   - Will discontinue mIVF when taking adequate PO intake  -  No active issues    HEMATOLOGIC:   - No active issues    ENDOCRINE:   - SSI  - Maintain euglycemia    MUSCULOSKELETAL/SKIN:   - No active issues    INFECTIOUS DISEASE:   - No active issues    GI PROPHYLAXIS:   - Not indicated at this time    DVT PROPHYLAXIS:   - Lovenox 30mg BID    DISPOSITION: Transfer to floor    Pt seen and Discussed with Hayden Mc CNP  Trauma Surgery  Floor: [id number] TICU: [id number]  Pager: [phone]    ==============================================================================  CHIEF COMPLAINT / OVERNIGHT EVENTS / HPI:   [facility] [place] for concern for seizure activity. Initially arrived with completely intact neurological exam. Then had sudden onset of severe expressive aphasia (incoherent speech), unclear if able to respond to commands vs mimicking, slight right hand weakness appreciated on repeat neurological exam. Neurosurgery and Neurology consults placed. cEEG monitoring ordered. Keppra 2 gram IV load given, followed by 750 BID for maintenance.     MEDICAL HISTORY / ROS:  Admission history and ROS reviewed. Pertinent changes as follows:      PHYSICAL EXAM:  Heart Rate:  [54-76]   Temp:  [35.4 °C (95.7 °F)-36.2 °C (97.1 °F)]   Resp:  [14-28]   BP: ()/()   Height:  [162.6 cm (5' 4\")]   Weight:  [97.4 kg (214 lb 11.7 oz)]   SpO2:  [94 %-100 %]   Physical Exam  Constitutional:       Appearance: She is not toxic-appearing.   HENT:      Head: Normocephalic.      " Comments: Old bruising to left side of face     Right Ear: External ear normal.      Left Ear: External ear normal.      Mouth/Throat:      Mouth: Mucous membranes are dry.   Eyes:      Conjunctiva/sclera: Conjunctivae normal.   Cardiovascular:      Rate and Rhythm: Regular rhythm. Bradycardia present.      Pulses: Normal pulses.   Pulmonary:      Breath sounds: Normal breath sounds.   Abdominal:      Palpations: Abdomen is soft.   Musculoskeletal:         General: Normal range of motion.      Cervical back: Normal range of motion. No tenderness.   Skin:     General: Skin is warm and dry.      Capillary Refill: Capillary refill takes less than 2 seconds.   Neurological:      General: No focal deficit present.      Mental Status: She is alert and oriented to person, place, and time.      Motor: No weakness.      Comments: Able to answer questions appropriately when using ILIANA to translate   Psychiatric:         Behavior: Behavior normal.         IMAGING SUMMARY:  (summary of new imaging findings, not a copy of dictation)  No new imaging    LABS:  Results from last 7 days   Lab Units 04/18/24  0049 04/17/24  0506 04/15/24  2111   WBC AUTO x10*3/uL 9.9 9.0 10.6   HEMOGLOBIN g/dL 11.8* 12.0 12.7   HEMATOCRIT % 33.7* 36.2 37.8   PLATELETS AUTO x10*3/uL 330 312 309   NEUTROS PCT AUTO % 65.1  --  66.0   LYMPHS PCT AUTO % 24.8  --  24.1   MONOS PCT AUTO % 6.9  --  7.2   EOS PCT AUTO % 2.4  --  1.9     Results from last 7 days   Lab Units 04/18/24  0148 04/15/24  2111   APTT seconds 22* 24.8   INR  1.1 1.0     Results from last 7 days   Lab Units 04/18/24  0049 04/17/24  0506 04/15/24  2111   SODIUM mmol/L 138 138 137   POTASSIUM mmol/L 4.3 4.1 4.3   CHLORIDE mmol/L 103 102 100   CO2 mmol/L 25 25 24   BUN mg/dL 24* 17 19   CREATININE mg/dL 0.89 0.70 0.80   CALCIUM mg/dL 8.6 8.8 9.2   PROTEIN TOTAL g/dL 6.2*  --  7.5   BILIRUBIN TOTAL mg/dL 0.8  --  0.6   ALK PHOS U/L 98  --  121   ALT U/L 16  --  17   AST U/L 17  --  18    GLUCOSE mg/dL 99 116* 102*     Results from last 7 days   Lab Units 04/18/24  0049 04/15/24  2111   BILIRUBIN TOTAL mg/dL 0.8 0.6         I have reviewed all medications, laboratory results, and imaging pertinent for today's encounter.

## 2024-04-19 ENCOUNTER — HOME CARE VISIT (OUTPATIENT)
Dept: HOME HEALTH SERVICES | Facility: HOME HEALTH | Age: 79
End: 2024-04-19

## 2024-04-19 LAB
GLUCOSE BLD MANUAL STRIP-MCNC: 118 MG/DL (ref 74–99)
LEVETIRACETAM SERPL-MCNC: 28 UG/ML (ref 10–40)

## 2024-04-19 PROCEDURE — 2500000001 HC RX 250 WO HCPCS SELF ADMINISTERED DRUGS (ALT 637 FOR MEDICARE OP)

## 2024-04-19 PROCEDURE — 99222 1ST HOSP IP/OBS MODERATE 55: CPT | Performed by: PHYSICAL MEDICINE & REHABILITATION

## 2024-04-19 PROCEDURE — 2500000004 HC RX 250 GENERAL PHARMACY W/ HCPCS (ALT 636 FOR OP/ED)

## 2024-04-19 PROCEDURE — 1200000002 HC GENERAL ROOM WITH TELEMETRY DAILY

## 2024-04-19 PROCEDURE — G0316 PR PROLONGED INPATIENT/OBSERVATION EM SVC EA 15 MIN: HCPCS | Performed by: INTERNAL MEDICINE

## 2024-04-19 PROCEDURE — 36415 COLL VENOUS BLD VENIPUNCTURE: CPT

## 2024-04-19 PROCEDURE — 2500000006 HC RX 250 W HCPCS SELF ADMINISTERED DRUGS (ALT 637 FOR ALL PAYERS): Mod: MUE

## 2024-04-19 PROCEDURE — 99232 SBSQ HOSP IP/OBS MODERATE 35: CPT | Performed by: SURGERY

## 2024-04-19 PROCEDURE — 82947 ASSAY GLUCOSE BLOOD QUANT: CPT

## 2024-04-19 PROCEDURE — 95715 VEEG EA 12-26HR INTMT MNTR: CPT

## 2024-04-19 PROCEDURE — 80177 DRUG SCRN QUAN LEVETIRACETAM: CPT

## 2024-04-19 PROCEDURE — 99223 1ST HOSP IP/OBS HIGH 75: CPT | Performed by: INTERNAL MEDICINE

## 2024-04-19 PROCEDURE — 95720 EEG PHY/QHP EA INCR W/VEEG: CPT | Performed by: STUDENT IN AN ORGANIZED HEALTH CARE EDUCATION/TRAINING PROGRAM

## 2024-04-19 PROCEDURE — 2500000006 HC RX 250 W HCPCS SELF ADMINISTERED DRUGS (ALT 637 FOR ALL PAYERS)

## 2024-04-19 RX ADMIN — SODIUM CHLORIDE 75 ML/HR: 9 INJECTION, SOLUTION INTRAVENOUS at 02:50

## 2024-04-19 RX ADMIN — ACETAMINOPHEN 975 MG: 325 TABLET ORAL at 08:52

## 2024-04-19 RX ADMIN — LEVETIRACETAM 750 MG: 500 INJECTION, SOLUTION INTRAVENOUS at 13:55

## 2024-04-19 RX ADMIN — SENNOSIDES AND DOCUSATE SODIUM 2 TABLET: 8.6; 5 TABLET ORAL at 20:24

## 2024-04-19 RX ADMIN — PAROXETINE 20 MG: 20 TABLET, FILM COATED ORAL at 10:22

## 2024-04-19 RX ADMIN — ACETAMINOPHEN 975 MG: 325 TABLET ORAL at 00:54

## 2024-04-19 RX ADMIN — ATORVASTATIN CALCIUM 80 MG: 80 TABLET, FILM COATED ORAL at 20:24

## 2024-04-19 RX ADMIN — ACETAMINOPHEN 975 MG: 325 TABLET ORAL at 19:02

## 2024-04-19 RX ADMIN — ENOXAPARIN SODIUM 30 MG: 100 INJECTION SUBCUTANEOUS at 20:24

## 2024-04-19 RX ADMIN — LEVETIRACETAM 750 MG: 500 INJECTION, SOLUTION INTRAVENOUS at 02:50

## 2024-04-19 RX ADMIN — ENOXAPARIN SODIUM 30 MG: 100 INJECTION SUBCUTANEOUS at 08:52

## 2024-04-19 RX ADMIN — OXYCODONE HYDROCHLORIDE 5 MG: 5 TABLET ORAL at 22:26

## 2024-04-19 RX ADMIN — LEVOTHYROXINE SODIUM 88 MCG: 0.09 TABLET ORAL at 10:22

## 2024-04-19 ASSESSMENT — PAIN SCALES - GENERAL
PAINLEVEL_OUTOF10: 5 - MODERATE PAIN
PAINLEVEL_OUTOF10: 0 - NO PAIN
PAINLEVEL_OUTOF10: 3
PAINLEVEL_OUTOF10: 6

## 2024-04-19 ASSESSMENT — PAIN - FUNCTIONAL ASSESSMENT
PAIN_FUNCTIONAL_ASSESSMENT: 0-10

## 2024-04-19 ASSESSMENT — PAIN DESCRIPTION - DESCRIPTORS: DESCRIPTORS: ACHING

## 2024-04-19 NOTE — PROGRESS NOTES
TCC assessment completed with patient's daughter. Explain role of TCC. Discussed PT recommendations for low intensity (home care). Stated she is not sure how well therapy in the home will work since there is a language barrier but she is willing to try. She is in agreement to use [facility].    Living Environment: Confirmed address in chart. Lives with daughter.  Primary Support Person: Daughter, Cale Mcclain, [phone]  PCP: Dr. Garay - last seen a few months ago.  Recent Falls: Two falls since December 2023  Assistive Devices: Walker  Oxygen: Denies  In-Home Services: Denies  Transportation: Daughter will transport home at discharge.  Pharmacy: [facility]    Lashanda Mccray RN, TCC

## 2024-04-19 NOTE — CARE PLAN
The patient's goals for the shift include      The clinical goals for the shift include To remain safe during shift

## 2024-04-19 NOTE — CONSULTS
Reason For Consult  TBI rehab    History Of Present Illness  Marybeth Duggan is a 78 y.o. female primarily Belizean speaking female who was recently admitted to Trauma on 4/7-4/10 after falling backwards from standing on 1 step and had a subsequent left SDH. She presented again to [facility] (4/16) for episodes of expressive aphasia and right sided weakness that started 4/15. CTH and MRI brain performed at [facility] was stable and patient was transferred to [facility] [place] [facility] on 4/17 for higher level of care. No neurosurgical intervention has been indicated. Neurology currently consulted for concern for seizure activity. PM&R was consulted for TBI rehab appropriateness.     PT (4/18) - Min A supine to sit and sit to supine, CGA sit to stand, Ambulated with rolling walker 25 feet  OT - has not evaluated   ST (4/18)- Difficult to assess given language barrier, patient skipped #12 when counting 1-21, regular diet, thin liquid  Precautions: Fall, seizure    Patient seen and evaluated at bedside with . She reports a headache and difficulty with her speech. Patient denies any weakness or numbness or tingling at this time. No chest pain or shortness of breath. Patient typically lives at home alone but plans to live with her daughter at discharge.     Social History:    Social supports: Daughter, 24 hour assistance available  Home situation: Lives in house, with 4 stairs to enter (with rails), and first floor B/B    Functional History:  Home DME: Cane, walker rolling or standar  Premorbid ADL's: independent   Premorbid Mobility: independent, PRN cane  Present: significant decrease in mobility and function and decreased cognition        Past Medical History  She has a past medical history of Bilateral shoulder pain, Personal history of other diseases of the circulatory system (08/08/2013), Personal history of other mental and behavioral disorders (08/08/2013), Pure hypercholesterolemia, unspecified (09/19/2013), and Thyroid  "condition.    Surgical History  She has a past surgical history that includes Other surgical history (10/22/2019); Other surgical history (10/22/2019); and Gallbladder surgery (11/30/2016).     Social History  She reports that she has never smoked. She has never used smokeless tobacco. She reports current alcohol use. She reports that she does not use drugs.    Family History  No family history on file.     Allergies  Patient has no known allergies.    Review of Systems  Complete review of systems obtained, negative unless otherwise indicated above.        Physical Exam  GENERAL:  Awake, alert, and oriented, no apparent distress, pleasant, and cooperative  PSYC: Mood is euthymic, affect is congruent  EAR, NOSE, THROAT:  moist membranes, anicteric sclera  LUNG: Nonlabored breathing, CTAB  HEART: No clubbing or cyanosis, RRR  ABDOMEN: Soft, nontender, non-distended   SKIN: Post surgical ecchymosis on left side of head   NEURO: Sensation is intact in the bilateral upper and lower extremities, possible expressive aphasia, negative Horrman;s   Strength:  C5: Deltoid 5/5 Left  5/5 Right  C6: Wrist Ext: 5/5 Left; 5/5 Right  C7: Triceps: 5/5 Left; 5/5 Right  C8: Finger flexion: 5/5 Left; 5/5 Right  T1: Interossei: 5/5 Left; 5/5 Right            L2: Hip flexors 5/5 Left; 5/5 Right  L3: Knee extension 5/5 Left; 5/5 Right   L4: Tib Ant. (Dorsiflexion) 5/5 Left; 5/5 Right  L5: EHL 5/5 Left; 5/5 Right  S1: Plantar flexion 5/5 Left; 5/5 RightStrength         Last Recorded Vitals  Blood pressure 144/76, pulse 68, temperature 36.1 °C (97 °F), temperature source Temporal, resp. rate 18, height 1.626 m (5' 4\"), weight 97.4 kg (214 lb 11.7 oz), SpO2 96%.    Relevant Results  CT head wo IV contrast    Result Date: 4/17/2024  Interpreted By:  Esteban Aguilar, STUDY: CT HEAD WO IV CONTRAST; 4/17/2024 12:34 pm   INDICATION: Signs/Symptoms:change in neuro status   COMPARISON: CT from 04/15/2024 and MRI from 04/16/2024   ACCESSION " NUMBER(S): [id number]   ORDERING CLINICIAN: MYRNA CARRANZA   TECHNIQUE: Axial images were obtained through the brain. No IV contrast was administered.   All CT examinations are performed with one or more of the following dose reduction techniques: Automated Exposure Control, adjustment of mA and/or kV according to patient size, or use of iterative reconstruction techniques.   FINDINGS: There is interval slight improvement of the previously described subdural hematoma along the left brain convexity measuring up to 5 mm in thickness (7 mm on the prior exam. There is associated minimal, 4 mm left to right midline shift, with minimal effacement of sulci over the left brain convexity. There is no transtentorial herniation. No new intracranial hemorrhagic foci are visualized. Left frontoparietal/temporal scalp hematoma is also again seen. The osseous structures are unremarkable.       Interval slight improvement of a subdural hematoma over the left brain convexity, with only minimal, 4 mm left to right midline shift. Stable large left scalp hematoma. No new/acute findings.   Signed by: Esteban Aguilar 4/17/2024 1:33 PM Dictation workstation:   [id number]    Carotid duplex bilateral    Result Date: 4/17/2024           [place] [address]            Phone [phone]  Vascular Lab Report  [facility] US CAROTID ARTERY DUPLEX BILATERAL Patient Name:     JAYA Mora                                       Physician: Study Date:       4/17/2024           Ordering          [id number] LUCY R                                       Provider:         DARCI MRN/PID:          [id number]            Fellow: Accession#:       [id number]        Technologist:     Marilyn Red RVT Date of           1945 / 78      Technologist 2: Birth/Age:        years Gender:           F                   Encounter#:       [id number] Admission Status: Inpatient           Location           [facility]                                       Performed:  Diagnosis/ICD: Episode of change in speech-R47.81 Indication:    Speech Disturbances, Other CPT Codes:     55065 Cerebrovascular Carotid Duplex scan complete  Pertinent History: Dysarthria; recent subdural hematoma following head trauma.  CONCLUSIONS: Right Carotid: Findings are consistent with less than 50% stenosis of the right proximal internal carotid artery. Right external carotid artery appears patent with no evidence of stenosis. The right vertebral artery is patent with antegrade flow. No evidence of hemodynamically significant stenosis in the right subclavian artery. Left Carotid: Findings are consistent with less than 50% stenosis of the left proximal internal carotid artery. Left external carotid artery appears patent with no evidence of stenosis. The left vertebral artery is patent with antegrade flow. No evidence of hemodynamically significant stenosis in the left subclavian artery. Additional Findings: Technically difficult exam due to body habitus, respirations and vessel tortuosity.  Imaging & Doppler Findings: Right Plaque Morph: The proximal right internal carotid artery demonstrates heterogenous and calcified plaque. The proximal right external carotid artery demonstrates heterogenous plaque. The proximal right common carotid artery demonstrates heterogenous plaque. The mid right common carotid artery demonstrates heterogenous plaque. The distal right common carotid artery demonstrates heterogenous and calcified plaque. Left Plaque Morph: The proximal left internal carotid artery demonstrates heterogenous and calcified plaque. The mid left common carotid artery demonstrates heterogenous plaque. The distal left common carotid artery demonstrates heterogenous and calcified plaque.   Right                      Left   PSV     EDV                PSV     EDV 31 cm/s           CCA P    59 cm/s 30 cm/s           CCA D    55 cm/s 49  cm/s 13 cm/s   ICA P    58 cm/s 21 cm/s 67 cm/s 18 cm/s   ICA M    55 cm/s 18 cm/s 79 cm/s 22 cm/s   ICA D    64 cm/s 18 cm/s 53 cm/s            ECA     72 cm/s 45 cm/s 11 cm/s Vertebral  30 cm/s 10 cm/s 82 cm/s         Subclavian 98 cm/s                Right Left ICA/CCA Ratio  1.6  1.1   Electronically signed by on at  ** Preliminary **     MR brain wo IV contrast    Result Date: 4/16/2024  Interpreted By:  Flako Malone, STUDY: MR BRAIN WO IV CONTRAST;  4/16/2024 6:45 pm   INDICATION: Signs/Symptoms:dysarthria.   COMPARISON: Recent head CT   ACCESSION NUMBER(S): [id number]   ORDERING CLINICIAN: LUCY BEJARANO   TECHNIQUE: Axial T2, FLAIR, DWI, gradient echo T2 and sagittal and coronal T1 weighted images of brain were acquired.   FINDINGS: Subdural mixed attenuation hematoma again detected measuring up to about 8 mm. There is a subcutaneous hematoma on the left frontal region measuring 6.8 x 2.2 cm. Minimal midline shift to the right of about 3 mm not significantly changed. No evidence of new hemorrhage. No findings of acute infarct. Pituitary region and craniocervical junction otherwise within normal limits. Punctate gradient susceptibility artifact seen in the left parietal region measures about 3 mm probably hemosiderin. Global volume loss and patchy periventricular low attenuation again seen.   Stable ventricular size.       Stable left subdural hematoma as well as left subcutaneous hematoma. The appearance of the brain is not significantly changed. Mild midline shift to the right again seen. No new hemorrhage is seen   No findings of acute infarct   MACRO: None   Signed by: Flako Malone 4/16/2024 7:57 PM Dictation workstation:   [id number]    ECG 12 lead    Result Date: 4/16/2024  Sinus rhythm with frequent atrial-paced complexes low  voltage precordial leads Inferior infarct , age undetermined Abnormal ECG When compared with ECG of 07-MAR-2023 14:59, Inferior infarct is now Present clinical  correlation Confirmed by Miguel Nicholson ([id number]) on 4/16/2024 12:51:11 PM    CT head wo IV contrast    Result Date: 4/15/2024  Interpreted By:  Saad Calle, STUDY: CT HEAD WO IV CONTRAST;  4/15/2024 8:52 pm   INDICATION: Signs/Symptoms:hx of head injury.   COMPARISON: 4/8/2024   ACCESSION NUMBER(S): [id number]   ORDERING CLINICIAN: TRISHA PALACIOS   TECHNIQUE: Contiguous axial images of the head were obtained without intravenous contrast.   FINDINGS: There is grossly stable size of left subdural hematoma which measures approximately 7 mm in thickness. The subdural hematoma is however decreased in attenuation in comparison to prior examination. There is grossly stable mass effect with approximately 4 mm left to right midline shift. There is cerebral atrophy and bilateral chronic white matter change. The ventricles are stable in size. No evidence of depressed calvarial fracture large left extracranial soft tissue hematoma is again noted.       Grossly stable size of left subdural hematoma which measures approximately 7 mm in thickness. There is stable mass effect with approximately 4 mm left to right midline shift.   Large extracranial scalp soft tissue hematoma is again noted.   MACRO: None   Signed by: Saad Calle 4/15/2024 9:25 PM Dictation workstation:   [id number]    CT head wo IV contrast    Result Date: 4/8/2024  Interpreted By:  Catrachito Vargas, STUDY: CT HEAD WO IV CONTRAST;  4/8/2024 8:17 am   INDICATION: Signs/Symptoms: stability of SDH.   COMPARISON: Head CT, 04/07/2024   ACCESSION NUMBER(S): [id number]   ORDERING CLINICIAN: AJNELL KINGSTON   TECHNIQUE: Noncontrast axial CT scan of the head was performed. Angled reformats in brain and bone windows were generated. The images were reviewed in bone, brain, blood and soft tissue windows.   FINDINGS: The heterogeneously hyperdense left subdural hematoma measures up to 8 mm in greatest diameter, similar to previous. A large scalp hematoma also appears  similar to previous. No new intracranial hemorrhage.   Mass effect on the left cerebral hemisphere with partial effacement of the sulci and left lateral ventricle, unchanged. Rightward midline shift measures up to 4 mm at the level of the foramen of Monro, unchanged. Patchy nonspecific low attenuation in the white matter is similar to previous and likely secondary to chronic small vessel ischemic disease. Generalized parenchymal volume loss with associated prominence of the ventricles and sulci, unchanged. The gray-white differentiation is intact. The basal cisterns are patent.   The calvarium is unremarkable. Bilateral lens replacements are again noted. Paranasal sinuses and mastoid air cells are clear.       Unchanged left subdural hematoma and associated mild rightward midline shift, no new intracranial hemorrhage.   MACRO: None   Signed by: Catrachito Vargas 4/8/2024 8:45 AM Dictation workstation:   [id number]     Assessment/Plan   Marybeth Duggan is a 78 y.o. female primarily Maori speaking female who was recently admitted to Trauma on 4/7-4/10 after falling backwards from standing on 1 step and had a subsequent left SDH. She presented again to [facility] (4/16) for episodes of expressive aphasia and right sided weakness that started 4/15. CTH and MRI brain performed at [facility] was stable and [place] [facility] [place] for higher level of care. No neurosurgical intervention has been indicated. Neurology currently consulted for concern for seizure activity. PM&R was consulted for TBI rehab appropriateness.     Recommendations:  # Hx of TBI  # Hx of Left SDH  - [facility] Level: 8    # Immobility   - Prevent secondary complications   - Skin protection: low air loss mattress, turn q 2 hours in bed, maintain bowel and bladder continence/containment  - Prevention of pulmonary complications: incentive spirometry and pulmonary toileting  - Maintain adequate nutrition and hydration  - Q shift PROM  of upper and lower extremities to prevent contracture    # Impaired mobility and Impaired independence with ADLs and I/ADLs  - Continue PT, working on bed mobility, transfers, balance, endurance, strength, gait, eval for appropriate assistive device  - Recommend OT Eval, to work  on functional cognition, functional mobility, upper limb strength/coordination, balance, endurance, eval for appropriate adaptive equipment, ADLs, and I/ADLs.    # Expressive Aphasia  - ST to continue to work with patient on cognition, language, and speech production    # Dispo  - Patient is not appropriate for acute inpatient rehabilitation at this time. Recommend subacute rehabilitation in SNF or home with home therapy if 24/7 care available. She would likely benefit from getting outpatient speech therapy   - Post rehab destination: anticipated home   - Must be off IV pain meds prior to transfer  - For questions, please contact via David Castro DO  Physical Medicine and Rehabilitation PGY-4  [facility]     Supervisory note:  Seen with Dr Castro, resident.  I saw and evaluated the patient. I personally obtained the key and critical portions of the history and physical exam. I reviewed the resident's documentation and discussed the patient with the resident. I agree with the resident's medical decision making as documented in the resident's note.       Reema Lockwood MD     Division of PM&R

## 2024-04-19 NOTE — PROGRESS NOTES
[facility]  TRAUMA ICU - PROGRESS NOTE    Patient Name: Marybeth Duggan  MRN: [id number]  Admit Date: 417  : 1945  AGE: 78 y.o.   GENDER: female  ==============================================================================  MECHANISM OF INJURY:   Marybeth Duggan is a 77 y/o primarily Cro[place]-speaking female who was recently admitted to the trauma service from  - 4/10 after a fall backwards from standing off 1 step and subsequent left SDH. Pt was discharged in stable condition. Family reports episodes of expressive aphasia and right side weakness that started 4/15. [facility] .  Patient was transferred to [facility] for higher level of care and vEEG.    LOC (yes/no?): No  Anticoagulant / Anti-platelet Rx? (for what dx?): ASA (hx of CAD)  Referring Facility Name (N/A for scene EMR run): [facility], [place]    INJURIES:   Recent traumatic left subdural hematoma, 7 mm thickness, with 5 mm left-to-right midline shift 2/2 fall backwards off 1 step (hospitalized on trauma service from  - 4/10), stable with slight interval improvement  Residual Superficial Hematoma of Left Scalp related to above    OTHER MEDICAL PROBLEMS:  Hypertension  Hyperlipidemia  Hypothyroidism  Coronary Artery Disease (on ASA 81 mg for cardioprotection)  Urinary Incontinence  Anxiety  Osteoarthritis    INCIDENTAL FINDINGS:  None    PROCEDURES:      ==============================================================================  TODAY'S ASSESSMENT AND PLAN OF CARE:      Traumatic SDH  - CT head  stable, no need for further imaging per NSGY   -> DVT Proph can be resumed, will hold ASA until  (14 days post bleed)   -> No neurosurgical interventions indicated   -> Tylenol PRN for headaches and pain  - yuri consulted, pending recs  -PM&R consulted for evaluation for possible TBI rehab, pending recs     TIA vs Stroke  - Neurology/Epilepsy  consulted   -> Continue Keppra 750 BID    -> Continue vEEG at this time    RESPIRATORY:   - No active issues  - SpO2 > 92%, currently on room air  - Incentive Spirometry Q1H while awake    CARDIOVASC:   - telemtry monitoring   - SBP  mmHg, MAP > 65  - Will resume metoprolol with hold parameters  - Restarting Atorvastatin 80mg daily    GI:   - Reg diet  - BR  - No active issues    :   - Will discontinue mIVF when taking adequate PO intake  -  No active issues    DVT PROPHYLAXIS:   - Lovenox 30mg BID    DISPOSITION: Transfer to floor    Pt seen and Discussed with Dr. Hernandez,    Ahmad A. Mejia, CNP  Trauma Surgery  Floor: [id number] TICU: [id number]  Pager: [phone]    ==============================================================================  CHIEF COMPLAINT / OVERNIGHT EVENTS / HPI:   [facility] for concern for seizure activity. Initially arrived with completely intact neurological exam. Then had sudden onset of severe expressive aphasia (incoherent speech), unclear if able to respond to commands vs mimicking, slight right hand weakness appreciated on repeat neurological exam. Neurosurgery and Neurology consults placed. cEEG monitoring ordered. Keppra 2 gram IV load given, followed by 750 BID for maintenance.     MEDICAL HISTORY / ROS:  Admission history and ROS reviewed. Pertinent changes as follows:      PHYSICAL EXAM:  Heart Rate:  [58-80]   Temp:  [35.8 °C (96.4 °F)-36.3 °C (97.3 °F)]   Resp:  [14-22]   BP: (121-183)/(62-83)   SpO2:  [95 %-99 %]   Physical Exam  Constitutional:       Appearance: She is not toxic-appearing.   HENT:      Head: Normocephalic.      Comments: Old bruising to left side of face     Right Ear: External ear normal.      Left Ear: External ear normal.      Mouth/Throat:      Mouth: Mucous membranes are dry.   Eyes:      Conjunctiva/sclera: Conjunctivae normal.   Cardiovascular:      Rate and Rhythm: Regular rhythm. Bradycardia present.      Pulses:  Normal pulses.   Pulmonary:      Breath sounds: Normal breath sounds.   Abdominal:      Palpations: Abdomen is soft.   Musculoskeletal:         General: Normal range of motion.      Cervical back: Normal range of motion. No tenderness.   Skin:     General: Skin is warm and dry.      Capillary Refill: Capillary refill takes less than 2 seconds.   Neurological:      General: No focal deficit present.      Mental Status: She is alert and oriented to person, place, and time.      Motor: No weakness.      Comments: Able to answer questions appropriately when using ILIANA to translate   Psychiatric:         Behavior: Behavior normal.         IMAGING SUMMARY:  (summary of new imaging findings, not a copy of dictation)  No new imaging    LABS:  Results from last 7 days   Lab Units 04/18/24  0049 04/17/24  0506 04/15/24  2111   WBC AUTO x10*3/uL 9.9 9.0 10.6   HEMOGLOBIN g/dL 11.8* 12.0 12.7   HEMATOCRIT % 33.7* 36.2 37.8   PLATELETS AUTO x10*3/uL 330 312 309   NEUTROS PCT AUTO % 65.1  --  66.0   LYMPHS PCT AUTO % 24.8  --  24.1   MONOS PCT AUTO % 6.9  --  7.2   EOS PCT AUTO % 2.4  --  1.9     Results from last 7 days   Lab Units 04/18/24  0148 04/15/24  2111   APTT seconds 22* 24.8   INR  1.1 1.0     Results from last 7 days   Lab Units 04/18/24  0049 04/17/24  0506 04/15/24  2111   SODIUM mmol/L 138 138 137   POTASSIUM mmol/L 4.3 4.1 4.3   CHLORIDE mmol/L 103 102 100   CO2 mmol/L 25 25 24   BUN mg/dL 24* 17 19   CREATININE mg/dL 0.89 0.70 0.80   CALCIUM mg/dL 8.6 8.8 9.2   PROTEIN TOTAL g/dL 6.2*  --  7.5   BILIRUBIN TOTAL mg/dL 0.8  --  0.6   ALK PHOS U/L 98  --  121   ALT U/L 16  --  17   AST U/L 17  --  18   GLUCOSE mg/dL 99 116* 102*     Results from last 7 days   Lab Units 04/18/24  0049 04/15/24  2111   BILIRUBIN TOTAL mg/dL 0.8 0.6         I have reviewed all medications, laboratory results, and imaging pertinent for today's encounter.

## 2024-04-19 NOTE — CONSULTS
Inpatient consult to Geriatric Medicine  Consult performed by: Sherri Ervin MD  Consult ordered by: Khoi Zelaya MD      Primary Team: Trauma    Admit Date: 4/17/2024    Emergency Contact:   Extended Emergency Contact Information  Primary Emergency Contact: VISHAL RANGEL  Address: [address] [place] [address] [facility] [place]  Home Phone: [phone]  Work Phone: [phone]  Mobile Phone: [phone]  Relation: Daughter     Reason For Consult: Re-admisison after fall on 4/7 with new aphasia, right sided weakness    History Of Present Illness:  Marybeth Duggan is a 78 y.o. female presenting with intermittent confusion with staring and expressive aphasia.  Patient has past medical history hypertension, hyperlipidemia, hypothyroidism, recently admitted and discharged on 4/10/2024 after she sustained a fall with resultant subdural hematoma with 5 mm midline shift and a large hematoma over the left lateral scalp with an overlying abrasion.  Patient was seen by neurosurgery in the admission and started on Keppra with a plan for follow-up CT head in 2 weeks.  She was discharged home to her daughter's house.  She presented back to the emergency room on 4/15/24 with intermittent staring, expressive aphasia/incoherent mumbling.  CT of the head in the emergency room showed a left subdural hematoma with 4 mm left-to-right midline shift, which was unchanged from previous. During the initial ospital stay, patient was noted with slurred speech.  Neurology was consulted in this hospital stay patient had MRI of the brain which showed a stable left subdural hematoma with mild midline shift.    History per patient:  Difficult to communicate due to Kyrgyz. She however appeared to understand some English. She denied any pain.     History per daughter:   Patient was discharged on 4/10/24 to her daughter's house. Per daughter, she was not quite herself. She preferred or did not want to talk to anybody.  Her daughter will coax her to talk to her friends. She noticed that her eyes will be staring or look like she was lost in thought.   About 1-2 days prior to her admisison, she mentioned tto her daughter that whilst on the phone with her sister, she cannot say the words and her words was mumbled. She She appeared to be quiet as she sat and she said she was thinking about what happened and will let the daughter know if it happens again. Soon after, she had 2nd episode which was mumbled.   Per daughter, patient at baseline has some cognitive concerns such as repeating herself.     What matters most to the patient: Her health, per daughter    Prior to Admission Meds  Prior to Admission Medications   Prescriptions Last Dose Informant Patient Reported? Taking?   PARoxetine (Paxil) 20 mg tablet  Child No No   Sig: Take 1 tablet (20 mg) by mouth once daily.   Patient taking differently: Take 1 tablet (20 mg) by mouth once daily in the morning.   acetaminophen (Tylenol) 325 mg tablet  Child No No   Sig: Take 2 by mouth every 6 hours for 5 days.   atorvastatin (Lipitor) 80 mg tablet  Child No No   Sig: TAKE 1 TABLET BY MOUTH AT  BEDTIME   bacitracin 500 unit/gram ointment  Child No No   Sig: Apply topically 3 times a day   cholecalciferol (Vitamin D-3) 25 MCG (1000 UT) tablet  Child Yes No   Sig: Take 1 tablet (25 mcg) by mouth once daily in the morning.   levothyroxine (Synthroid, Levoxyl) 88 mcg tablet  Child No No   Sig: Take 1 tablet (88 mcg) by mouth once daily.   Patient taking differently: Take 1 tablet (88 mcg) by mouth once daily in the morning. Take before meals.   metoprolol tartrate (Lopressor) 50 mg tablet  Child No No   Sig: Take 1 tablet by mouth 2 times a day.      Facility-Administered Medications: None        Current Meds in Hospital  Current Facility-Administered Medications   Medication Dose Route Frequency Provider Last Rate Last Admin    acetaminophen (Tylenol) tablet 975 mg  975 mg oral q6h LUN Ryan VALDIVIA  DO Brandi   975 mg at 04/19/24 0852    atorvastatin (Lipitor) tablet 80 mg  80 mg oral Nightly Ahmad A Mejia, APRN-CNP   80 mg at 04/18/24 2151    dextrose 50 % injection 12.5 g  12.5 g intravenous q15 min PRN Ahmad A Mejia, APRN-CNP        dextrose 50 % injection 25 g  25 g intravenous q15 min PRN Ahmad A Mejia, APRN-CNP        enoxaparin (Lovenox) syringe 30 mg  30 mg subcutaneous q12h Ahmad A Mejia, APRN-CNP   30 mg at 04/19/24 0852    glucagon (Glucagen) injection 1 mg  1 mg intramuscular q15 min PRN Ahmad A Mjeia, APRN-CNP        glucagon (Glucagen) injection 1 mg  1 mg intramuscular q15 min PRN Ahmad A Mejia, APRN-CNP        levETIRAcetam (Keppra) 750 mg in dextrose 5 % in water (D5W) 100 mL IVPB  750 mg intravenous q12h Ahmad A Mejia, APRN-CNP   Stopped at 04/19/24 0305    levothyroxine (Synthroid, Levoxyl) tablet 88 mcg  88 mcg oral Daily Ahmad A Mejia, APRN-CNP   88 mcg at 04/19/24 1022    metoprolol tartrate (Lopressor) injection 5 mg  5 mg intravenous q6h PRN Ahmad A Mejia, APRN-CNP        oxyCODONE (Roxicodone) immediate release tablet 5 mg  5 mg oral q4h PRN Ryan Paz, DO   5 mg at 04/18/24 1857    oxygen (O2) therapy   inhalation Continuous PRN - O2/gases Ahmad A Mejia, APRN-CNP        PARoxetine (Paxil) tablet 20 mg  20 mg oral Daily Ahmad A Mejia, APRN-CNP   20 mg at 04/19/24 1022    sennosides-docusate sodium (Keely-Colace) 8.6-50 mg per tablet 2 tablet  2 tablet oral Nightly Ryan Paz, DO   2 tablet at 04/18/24 2151    sodium chloride 0.9% infusion  75 mL/hr intravenous Continuous Ahmad A Mejia, APRN-CNP 75 mL/hr at 04/19/24 0623 75 mL/hr at 04/19/24 0623        The patient's outpatient electronic medical record (EMR) is reviewed, notable information includes:  4/4/24 - Patient complains of long painful toenails that hurt with pressure. Toenails were debriuided  3/29/24 - Cortisone injection to right shoulder  3/13/24 - Primary care office  visit  12/17/23- ED visit with concern for nasal fracture      Past Medical History  Past Medical History:   Diagnosis Date    Bilateral shoulder pain     Personal history of other diseases of the circulatory system 08/08/2013    History of hypertension    Personal history of other mental and behavioral disorders 08/08/2013    History of depression    Pure hypercholesterolemia, unspecified 09/19/2013    Low-density-lipoid-type (LDL) hyperlipoproteinemia    Thyroid condition         Surgical History  Past Surgical History:   Procedure Laterality Date    GALLBLADDER SURGERY  11/30/2016    Gallbladder Surgery    OTHER SURGICAL HISTORY  10/22/2019    Hip replacement    OTHER SURGICAL HISTORY  10/22/2019    Thyroidectomy total      Family History  Mother- Stomach cancer  Father -No known significant     Social History  -Alcohol use: Yes - occasionally  -Tobacco use: No  -Illicit drug use: No  -Exercise: Walks around  -Spiritual needs: Yes, Sabianist  -Marital Status:   Occupation: Retired from factory work/  Highest Level of Education: Less than High School  Community Resources: None  [place]: No  Current living environment: She was living by herself    Activities of Daily Living:  Basic ADLs: (I= independent, A= assistance, D= dependent)  Bathing: I, Dressing: I, Toileting: I, Transferring: D , walks with depends Continence: D , wears depends, been ongoing for 1 year, Feeding: I    Lowry Index:  4  Instrumental ADLs: (I= independent, A= assistance, D= dependent)  Ability to use phone: I, Shopping: I, Cooking: I, Housekeeping: I, Laundry: I, Transportation:  Never got a license , Medications: I, Handle Finances: I   Reading Scale:  8    Allergies  Patient has no known allergies.    Review of Systems  Review of System:  Constitutional:   -Night sweats/fever: No     -Weight change: No change    Integumentary: No rash    Ears, Nose, Throat:  -Hearing loss: Mild        Eyes:  -Vision loss: Wears for  reading    Cardiovascular:  -Chest Pain: No  -Orthopnea: No      -Paroxysmal nocturnal dyspnea: No  -Edema: no    Respiratory:   -Dyspnea: no  -Wheezing: no    Gastrointestinal:           -Appetite: good  -Difficulty chewing/ swallowing: good  -Heartburn: no  -Bowels: good    Genitourinary:   -Incontinence: yes    Musculoskeletal:  -Mobility: walks with cane  -Pain: Complains of bilateral knee pain, due for knee injections on 4/26/24    Neurological:  -Confusion: No  -Falls: No  -Gait: No  -Memory: See HPI  -Tremor: No    Psychiatric:  -Mood: Good  -Sleep: Occasional insomnia    Documents on file and valid:  Advance Directive/Living Will: No   Health Care Power of : No, next of kin is daughter, Sarahi  Code Status: Full Code    Physical Exam  GEN: Alert, oriented x3, not pale, not jaundiced, well hydrated  CVS: HS I +II, regular, no murmurs  RESP: Diminished air entry  ABD: Full, soft, BS present, nontender, no palpable organs,   EXT: No bilateral leg edema    Last Recorded Vitals      4/18/2024     8:00 PM 4/18/2024     9:00 PM 4/18/2024    10:00 PM 4/19/2024    12:00 AM 4/19/2024     1:00 AM 4/19/2024     2:48 AM 4/19/2024     7:40 AM   Vitals   Systolic 148 121 139 160 183 131 144   Diastolic 78 73 75 83 83 74 76   Heart Rate 68 59 64 64 69 70 68   Temp 36.2 °C (97.2 °F)   35.8 °C (96.4 °F)  36.3 °C (97.3 °F) 36.1 °C (97 °F)   Resp 20 14 18 15 22 22 18      Vitals:    04/18/24 0000   Weight: 97.4 kg (214 lb 11.7 oz)        Confusion Assessment Method(CAM) for diagnosis of delirium:    1.  Acute onset or fluctuating course: absent/present: Absent  2.  Inattention: absent/present: Absent  3.  Disorganized thinking: absent/present: Absent  4.  Altered level of consciousness: absent/present: Absent  CAM: negative    AT Score For Assessment of Delirium and Cognitive Impairment:    Alertness: 0  Normal(fully alert,but not agitated, throughout assessment)=0  Mild sleepiness for <10 seconds after walking, then  normal=0  Clearly abnormal=4  2.  AMT4: 0  No mistakes=0  One mistake=1  Two or more mistakes/untestable=2  3.  Attention: 0  Achieves seven months or more correctly=0  Starts but scores <7 months/ refuses to start=1  Untestable(cannot start because unwell, drowsy, inattentive)=2  4.  Acute: 0  No=0  Yes=4    Total Score: 0  4 or above: Possible delirium +/- cognitive impairment  1-3: Possible cognitive impairment  0: Delirium or severe cognitive impairment unlikely(but delirium still possible if (4) information incomplete)     Relevant Results  Lab Results   Component Value Date    TSH 3.89 04/17/2024    EXUPLCUD41 274 04/18/2024    VITD25 52 11/09/2023    HGBA1C 6.3 (H) 03/13/2024     Results from last 7 days   Lab Units 04/18/24  0148 04/18/24  0049 04/17/24  0506 04/15/24  2111   WBC AUTO x10*3/uL  --  9.9 9.0 10.6   HEMOGLOBIN g/dL  --  11.8* 12.0 12.7   HEMATOCRIT %  --  33.7* 36.2 37.8   CHOLESTEROL mg/dL  --   --  133  --    TRIGLYCERIDES mg/dL  --   --  108  --    HDL mg/dL  --   --  43.0*  --    ALT U/L  --  16  --  17   AST U/L  --  17  --  18   SODIUM mmol/L  --  138 138 137   POTASSIUM mmol/L  --  4.3 4.1 4.3   CHLORIDE mmol/L  --  103 102 100   CREATININE mg/dL  --  0.89 0.70 0.80   BUN mg/dL  --  24* 17 19   CO2 mmol/L  --  25 25 24   INR  1.1  --   --  1.0       Recent Imaging Results      Head/Brain Imaging  === Results for orders placed during the hospital encounter of 04/15/24 ===    CT head wo IV contrast [[id number]] 04/17/2024    Status: Normal  Interval slight improvement of a subdural hematoma over the left  brain convexity, with only minimal, 4 mm left to right midline shift.  Stable large left scalp hematoma. No new/acute findings.    Signed by: Esteban Aguilar 4/17/2024 1:33 PM  Dictation workstation:   [id number]  No results found for this or any previous visit.     DATA:  EKG: QTC  Encounter Date: 04/15/24   ECG 12 lead   Result Value    Ventricular Rate 66    Atrial Rate 66    MD Interval  152    QRS Duration 76    QT Interval 406    QTC Calculation(Bazett) 425    P Axis 42    R Axis -10    T Axis 24    QRS Count 10    Q Onset 221    P Onset 145    P Offset 203    T Offset 424    QTC Fredericia 419    Narrative    Sinus rhythm with frequent atrial-paced complexes  low  voltage precordial leads  Inferior infarct , age undetermined  Abnormal ECG  When compared with ECG of 07-MAR-2023 14:59,    Inferior infarct is now Present  clinical correlation  Confirmed by Miguel Nicholson ([id number]) on 4/16/2024 12:51:11 PM     Anti-psychotics in 48 hours:  Opioids/Benzodiazepines in 48 hours:  Anticholinergics on board:Yes - Paxil  Restraints:No  Indwelling catheters:No  Last BM:  UO in 24 hours:  Activity in the past 24 hours:  Need for ambulatory devices:    Assessment/Plan   This is a/an 78 y.o. year old female, with past medical history relevant for recent SDH on 4/7/24, presenting for intermittent confusion, , being seen in geriatric consultation for fall, recent SDH.     Principal Problem:    Fluctuating mental status    1. Intermittent expressive aphasia, right facial and upper extremity weakness, concerning for focal seizure  2. Recent Acute fall, 2nd fall in 6 months  3. Acute left subdural hematoma  4. Left scalp superficial hematoma  5. Hypertension, fairly uncontrolled  6. Hyperlipidemia  7 Hypothyroidism  8. CAD  9. Anxiety  10. Osteoarthritis    Plan:  Continue with continuous EEG per neurology  Continue per neurosurgery recommendations of conservative management  Continue with Keppra BID  Resume patient on home metoprolol -start from 12.5mg BID  Recommend checking orthostatic vitals in am  Agree with PT/OT evaluations  She is at risk for acute delirium, recommend continuing with delirium precautions  Consider scheduling Tylenol 975mg TID for pain control due to language barrier, just in case she is unable to cannot fully express when she is in pain  SW consult for healthcare power of  paperwork-  discussed with daughter    Care Transitions:  -Recommended level for discharge: Low intensity level of continued care  -Home going considerations: Patient is an assist x1  -Primary care physician: Dr. Ashwin Garay    Goals of Care:  -Health care power of :None  -Living will:No  Code status:Full code - daughter to discuss with patient    4M AGE-FRIENDLY INITIATIVE:  What matters most to patient: Her health  Medications: Keppra, Paxil  Mentation: Cam negative, 4AT 0  Mobility: Assist x1, PT/OT eval, [facility] 19,       Geriatric medicine will continue to follow the patient. Thank you for allowing geriatric medicine to be involved in the care of your patient. Geriatric medicine consultation team is available during work hours Monday through Friday. For any emergency issues requiring immediate assistance over the weekend, please page Geriatrics pager [phone]    Consult Billing Time  Prep time on date of patient encounter(minutes):30  Time directly with patient/family/caregiver(minutes):50  Documentation time(minutes):30  TOTAL TIME(minutes):110    Sherri Ervin MD

## 2024-04-20 LAB
ALBUMIN SERPL BCP-MCNC: 3.2 G/DL (ref 3.4–5)
ANION GAP SERPL CALC-SCNC: 13 MMOL/L (ref 10–20)
BASOPHILS # BLD AUTO: 0.03 X10*3/UL (ref 0–0.1)
BASOPHILS NFR BLD AUTO: 0.4 %
BUN SERPL-MCNC: 14 MG/DL (ref 6–23)
CALCIUM SERPL-MCNC: 8.8 MG/DL (ref 8.6–10.6)
CHLORIDE SERPL-SCNC: 105 MMOL/L (ref 98–107)
CO2 SERPL-SCNC: 25 MMOL/L (ref 21–32)
CREAT SERPL-MCNC: 0.69 MG/DL (ref 0.5–1.05)
EGFRCR SERPLBLD CKD-EPI 2021: 89 ML/MIN/1.73M*2
EOSINOPHIL # BLD AUTO: 0.21 X10*3/UL (ref 0–0.4)
EOSINOPHIL NFR BLD AUTO: 2.5 %
ERYTHROCYTE [DISTWIDTH] IN BLOOD BY AUTOMATED COUNT: 12.2 % (ref 11.5–14.5)
GLUCOSE SERPL-MCNC: 110 MG/DL (ref 74–99)
HCT VFR BLD AUTO: 37.2 % (ref 36–46)
HGB BLD-MCNC: 11.9 G/DL (ref 12–16)
IMM GRANULOCYTES # BLD AUTO: 0.03 X10*3/UL (ref 0–0.5)
IMM GRANULOCYTES NFR BLD AUTO: 0.4 % (ref 0–0.9)
LYMPHOCYTES # BLD AUTO: 1.77 X10*3/UL (ref 0.8–3)
LYMPHOCYTES NFR BLD AUTO: 21.2 %
MAGNESIUM SERPL-MCNC: 1.86 MG/DL (ref 1.6–2.4)
MCH RBC QN AUTO: 32.4 PG (ref 26–34)
MCHC RBC AUTO-ENTMCNC: 32 G/DL (ref 32–36)
MCV RBC AUTO: 101 FL (ref 80–100)
MONOCYTES # BLD AUTO: 0.54 X10*3/UL (ref 0.05–0.8)
MONOCYTES NFR BLD AUTO: 6.5 %
NEUTROPHILS # BLD AUTO: 5.77 X10*3/UL (ref 1.6–5.5)
NEUTROPHILS NFR BLD AUTO: 69 %
NRBC BLD-RTO: 0 /100 WBCS (ref 0–0)
PHOSPHATE SERPL-MCNC: 2.9 MG/DL (ref 2.5–4.9)
PLATELET # BLD AUTO: 314 X10*3/UL (ref 150–450)
POTASSIUM SERPL-SCNC: 3.9 MMOL/L (ref 3.5–5.3)
RBC # BLD AUTO: 3.67 X10*6/UL (ref 4–5.2)
SODIUM SERPL-SCNC: 139 MMOL/L (ref 136–145)
WBC # BLD AUTO: 8.4 X10*3/UL (ref 4.4–11.3)

## 2024-04-20 PROCEDURE — 95715 VEEG EA 12-26HR INTMT MNTR: CPT

## 2024-04-20 PROCEDURE — 1200000002 HC GENERAL ROOM WITH TELEMETRY DAILY

## 2024-04-20 PROCEDURE — 2500000001 HC RX 250 WO HCPCS SELF ADMINISTERED DRUGS (ALT 637 FOR MEDICARE OP)

## 2024-04-20 PROCEDURE — 2500000004 HC RX 250 GENERAL PHARMACY W/ HCPCS (ALT 636 FOR OP/ED)

## 2024-04-20 PROCEDURE — 83735 ASSAY OF MAGNESIUM: CPT | Performed by: NURSE PRACTITIONER

## 2024-04-20 PROCEDURE — 99232 SBSQ HOSP IP/OBS MODERATE 35: CPT | Performed by: SURGERY

## 2024-04-20 PROCEDURE — 2500000006 HC RX 250 W HCPCS SELF ADMINISTERED DRUGS (ALT 637 FOR ALL PAYERS): Mod: MUE

## 2024-04-20 PROCEDURE — 80069 RENAL FUNCTION PANEL: CPT | Performed by: NURSE PRACTITIONER

## 2024-04-20 PROCEDURE — 95720 EEG PHY/QHP EA INCR W/VEEG: CPT | Performed by: STUDENT IN AN ORGANIZED HEALTH CARE EDUCATION/TRAINING PROGRAM

## 2024-04-20 PROCEDURE — 2500000006 HC RX 250 W HCPCS SELF ADMINISTERED DRUGS (ALT 637 FOR ALL PAYERS)

## 2024-04-20 PROCEDURE — 85025 COMPLETE CBC W/AUTO DIFF WBC: CPT | Performed by: NURSE PRACTITIONER

## 2024-04-20 PROCEDURE — 36415 COLL VENOUS BLD VENIPUNCTURE: CPT | Performed by: NURSE PRACTITIONER

## 2024-04-20 RX ADMIN — LEVETIRACETAM 750 MG: 500 INJECTION, SOLUTION INTRAVENOUS at 14:11

## 2024-04-20 RX ADMIN — PAROXETINE 20 MG: 20 TABLET, FILM COATED ORAL at 09:43

## 2024-04-20 RX ADMIN — LEVOTHYROXINE SODIUM 88 MCG: 0.09 TABLET ORAL at 09:43

## 2024-04-20 RX ADMIN — ENOXAPARIN SODIUM 30 MG: 100 INJECTION SUBCUTANEOUS at 09:43

## 2024-04-20 RX ADMIN — SENNOSIDES AND DOCUSATE SODIUM 2 TABLET: 8.6; 5 TABLET ORAL at 20:56

## 2024-04-20 RX ADMIN — ATORVASTATIN CALCIUM 80 MG: 80 TABLET, FILM COATED ORAL at 20:56

## 2024-04-20 RX ADMIN — LEVETIRACETAM 750 MG: 500 INJECTION, SOLUTION INTRAVENOUS at 02:00

## 2024-04-20 RX ADMIN — ACETAMINOPHEN 975 MG: 325 TABLET ORAL at 04:36

## 2024-04-20 RX ADMIN — ACETAMINOPHEN 975 MG: 325 TABLET ORAL at 15:29

## 2024-04-20 RX ADMIN — ENOXAPARIN SODIUM 30 MG: 100 INJECTION SUBCUTANEOUS at 20:56

## 2024-04-20 ASSESSMENT — COGNITIVE AND FUNCTIONAL STATUS - GENERAL
TOILETING: A LITTLE
PERSONAL GROOMING: A LITTLE
TURNING FROM BACK TO SIDE WHILE IN FLAT BAD: A LITTLE
DRESSING REGULAR UPPER BODY CLOTHING: A LITTLE
MOBILITY SCORE: 18
STANDING UP FROM CHAIR USING ARMS: A LITTLE
WALKING IN HOSPITAL ROOM: A LITTLE
CLIMB 3 TO 5 STEPS WITH RAILING: A LITTLE
MOBILITY SCORE: 18
MOVING FROM LYING ON BACK TO SITTING ON SIDE OF FLAT BED WITH BEDRAILS: A LITTLE
DRESSING REGULAR LOWER BODY CLOTHING: A LITTLE
DAILY ACTIVITIY SCORE: 19
TURNING FROM BACK TO SIDE WHILE IN FLAT BAD: A LITTLE
STANDING UP FROM CHAIR USING ARMS: A LITTLE
MOVING FROM LYING ON BACK TO SITTING ON SIDE OF FLAT BED WITH BEDRAILS: A LITTLE
CLIMB 3 TO 5 STEPS WITH RAILING: A LITTLE
MOVING TO AND FROM BED TO CHAIR: A LITTLE
HELP NEEDED FOR BATHING: A LITTLE
WALKING IN HOSPITAL ROOM: A LITTLE
MOVING TO AND FROM BED TO CHAIR: A LITTLE

## 2024-04-20 ASSESSMENT — PAIN SCALES - GENERAL
PAINLEVEL_OUTOF10: 0 - NO PAIN
PAINLEVEL_OUTOF10: 0 - NO PAIN

## 2024-04-20 ASSESSMENT — PAIN - FUNCTIONAL ASSESSMENT
PAIN_FUNCTIONAL_ASSESSMENT: 0-10
PAIN_FUNCTIONAL_ASSESSMENT: 0-10

## 2024-04-20 NOTE — CARE PLAN
The patient's goals for the shift include      The clinical goals for the shift include pt. will remain pain free.

## 2024-04-20 NOTE — PROGRESS NOTES
[facility]  TRAUMA ICU - PROGRESS NOTE    Patient Name: Marybeth Duggan  MRN: [id number]  Admit Date: 417  : 1945  AGE: 78 y.o.   GENDER: female  ==============================================================================  MECHANISM OF INJURY:   Marybeth Duggan is a 79 y/o primarily Cro[place]-speaking female who was recently admitted to the trauma service from  - 4/10 after a fall backwards from standing off 1 step and subsequent left SDH. Pt was discharged in stable condition. Family reports episodes of expressive aphasia and right side weakness that started 4/15. [facility] .  Patient was transferred to [facility] for higher level of care and vEEG.    LOC (yes/no?): No  Anticoagulant / Anti-platelet Rx? (for what dx?): ASA (hx of CAD)  Referring Facility Name (N/A for scene EMR run): [facility]    INJURIES:   Recent traumatic left subdural hematoma, 7 mm thickness, with 5 mm left-to-right midline shift 2/2 fall backwards off 1 step (hospitalized on trauma service from  - 4/10), stable with slight interval improvement  Residual Superficial Hematoma of Left Scalp related to above    OTHER MEDICAL PROBLEMS:  Hypertension  Hyperlipidemia  Hypothyroidism  Coronary Artery Disease (on ASA 81 mg for cardioprotection)  Urinary Incontinence  Anxiety  Osteoarthritis    INCIDENTAL FINDINGS:  None    PROCEDURES:  None    ==============================================================================  TODAY'S ASSESSMENT AND PLAN OF CARE:  Marybeth Duggan is a 78 y.o. female in the ICU due to: Frequent Neuro checks    Updated Plan Post Rounds:    NEURO/PAIN/SEDATION:     Traumatic SDH  - CT head  stable, no need for further imaging per NSGY   -> DVT Proph can be resumed, will hold ASA until  (14 days post bleed)   -> No neurosurgical interventions indicated    TIA vs Stroke  - Neurology/Epilepsy consulted   ->  Continue Keppra 750 BID    -> Continue vEEG at this time  - Appreciate ICU care  ==============================================================================  CHIEF COMPLAINT / OVERNIGHT EVENTS / HPI:   [facility] activity. Initially arrived with completely intact neurological exam. Then had sudden onset of severe expressive aphasia (incoherent speech), unclear if able to respond to commands vs mimicking, slight right hand weakness appreciated on repeat neurological exam. Neurosurgery and Neurology consults placed. cEEG monitoring ordered. Keppra 2 gram IV load given, followed by 750 BID for maintenance.     MEDICAL HISTORY / ROS:  Admission history and ROS reviewed. Pertinent changes as follows:      PHYSICAL EXAM:  Heart Rate:  [68-89]   Temp:  [36.1 °C (97 °F)-36.6 °C (97.9 °F)]   Resp:  [18]   BP: (143-166)/(72-86)   SpO2:  [94 %-100 %]   Physical Exam  Constitutional:       Appearance: She is not toxic-appearing.   HENT:      Head: Normocephalic.      Comments: Old bruising to left side of face     Right Ear: External ear normal.      Left Ear: External ear normal.      Mouth/Throat:      Mouth: Mucous membranes are dry.   Eyes:      Conjunctiva/sclera: Conjunctivae normal.   Cardiovascular:      Rate and Rhythm: Regular rhythm. Bradycardia present.      Pulses: Normal pulses.   Pulmonary:      Breath sounds: Normal breath sounds.   Abdominal:      Palpations: Abdomen is soft.   Musculoskeletal:         General: Normal range of motion.      Cervical back: Normal range of motion. No tenderness.   Skin:     General: Skin is warm and dry.      Capillary Refill: Capillary refill takes less than 2 seconds.   Neurological:      General: No focal deficit present.      Mental Status: She is alert and oriented to person, place, and time.      Motor: No weakness.      Comments: Able to answer questions appropriately when using ILIANA to translate   Psychiatric:          Behavior: Behavior normal.         IMAGING SUMMARY:  (summary of new imaging findings, not a copy of dictation)  No new imaging    LABS:  Results from last 7 days   Lab Units 04/18/24  0049 04/17/24  0506 04/15/24  2111   WBC AUTO x10*3/uL 9.9 9.0 10.6   HEMOGLOBIN g/dL 11.8* 12.0 12.7   HEMATOCRIT % 33.7* 36.2 37.8   PLATELETS AUTO x10*3/uL 330 312 309   NEUTROS PCT AUTO % 65.1  --  66.0   LYMPHS PCT AUTO % 24.8  --  24.1   MONOS PCT AUTO % 6.9  --  7.2   EOS PCT AUTO % 2.4  --  1.9     Results from last 7 days   Lab Units 04/18/24  0148 04/15/24  2111   APTT seconds 22* 24.8   INR  1.1 1.0     Results from last 7 days   Lab Units 04/18/24  0049 04/17/24  0506 04/15/24  2111   SODIUM mmol/L 138 138 137   POTASSIUM mmol/L 4.3 4.1 4.3   CHLORIDE mmol/L 103 102 100   CO2 mmol/L 25 25 24   BUN mg/dL 24* 17 19   CREATININE mg/dL 0.89 0.70 0.80   CALCIUM mg/dL 8.6 8.8 9.2   PROTEIN TOTAL g/dL 6.2*  --  7.5   BILIRUBIN TOTAL mg/dL 0.8  --  0.6   ALK PHOS U/L 98  --  121   ALT U/L 16  --  17   AST U/L 17  --  18   GLUCOSE mg/dL 99 116* 102*     Results from last 7 days   Lab Units 04/18/24  0049 04/15/24  2111   BILIRUBIN TOTAL mg/dL 0.8 0.6         I have reviewed all medications, laboratory results, and imaging pertinent for today's encounter.

## 2024-04-20 NOTE — PROGRESS NOTES
[facility]  TRAUMA ICU - PROGRESS NOTE    Patient Name: Marybeth Duggan  MRN: [id number]  Admit Date: 417  : 1945  AGE: 78 y.o.   GENDER: female  ==============================================================================  MECHANISM OF INJURY:   Marybeth Duggan is a 79 y/o primarily Cro[place]-speaking female who was recently admitted to the trauma service from  - 4/10 after a fall backwards from standing off 1 step and subsequent left SDH. Pt was discharged in stable condition. Family reports episodes of expressive aphasia and right side weakness that started 4/15. Patient [facility] .  Patient was transferred to [facility] for higher level of care and vEEG.    LOC (yes/no?): No  Anticoagulant / Anti-platelet Rx? (for what dx?): ASA (hx of CAD)  Referring Facility Name (N/A for scene EMR run): [facility]    INJURIES:   Recent traumatic left subdural hematoma, 7 mm thickness, with 5 mm left-to-right midline shift 2/2 fall backwards off 1 step (hospitalized on trauma service from  - 4/10), stable with slight interval improvement  Residual Superficial Hematoma of Left Scalp related to above    OTHER MEDICAL PROBLEMS:  Hypertension  Hyperlipidemia  Hypothyroidism  Coronary Artery Disease (on ASA 81 mg for cardioprotection)  Urinary Incontinence  Anxiety  Osteoarthritis    INCIDENTAL FINDINGS:  None    PROCEDURES:      ==============================================================================  TODAY'S ASSESSMENT AND PLAN OF CARE:      Traumatic SDH  - CT head  stable, no need for further imaging per NSGY   -> DVT Proph can be resumed, will hold ASA until  (14 days post bleed)   -> No neurosurgical interventions indicated   -> Tylenol PRN for headaches and pain  -PM&R consulted, stated that patient is not candidate for TBI rehab at this time. Would be better suited for SNF/vs home with therapy     TIA vs  Stroke  - Neurology/Epilepsy consulted   -> Continue Keppra 750 BID    -> Continue vEEG at this time   - > EEG without any epileptiform changes; possible post-ictal aphasia   --> follow up with epilepsy as outpatient, epilepsy team signed off at this time     RESPIRATORY:   - No active issues  - SpO2 > 92%, currently on room air  - Incentive Spirometry Q1H while awake    CARDIOVASC:   - telemtry monitoring   - SBP  mmHg, MAP > 65  - Will resume metoprolol with hold parameters  - Restarting Atorvastatin 80mg daily    GI:   - Reg diet  - BR  - No active issues    :   - Will discontinue mIVF when taking adequate PO intake  -  No active issues    DVT PROPHYLAXIS:   - Lovenox 30mg BID    Dispo: RNF. Family requesting SNF at this time. Will reach out to social work for SNF options.     D/w attending surgeon Dr. Zelaya.   ==============================================================================  CHIEF COMPLAINT / OVERNIGHT EVENTS / HPI:   No acute events overnight. Tolerating regular diet, voiding adequately.     MEDICAL HISTORY / ROS:  Admission history and ROS reviewed. Pertinent changes as follows:      PHYSICAL EXAM:  Heart Rate:  [80-89]   Temp:  [36.3 °C (97.4 °F)-36.6 °C (97.9 °F)]   Resp:  [18]   BP: (122-169)/(74-94)   SpO2:  [94 %-97 %]   Physical Exam  Constitutional:       Appearance: She is not toxic-appearing.   HENT:      Head: Normocephalic.      Comments: Old bruising to left side of face     Right Ear: External ear normal.      Left Ear: External ear normal.      Mouth/Throat:      Mouth: Mucous membranes are dry.   Eyes:      Conjunctiva/sclera: Conjunctivae normal.   Cardiovascular:      Rate and Rhythm: Regular rhythm. Bradycardia present.      Pulses: Normal pulses.   Pulmonary:      Breath sounds: Normal breath sounds.   Abdominal:      Palpations: Abdomen is soft.   Musculoskeletal:         General: Normal range of motion.      Cervical back: Normal range of motion. No tenderness.    Skin:     General: Skin is warm and dry.      Capillary Refill: Capillary refill takes less than 2 seconds.   Neurological:      General: No focal deficit present.      Mental Status: She is alert and oriented to person, place, and time.      Motor: No weakness.      Comments: Able to answer questions appropriately when using ILIANA to translate   Psychiatric:         Behavior: Behavior normal.         IMAGING SUMMARY:  (summary of new imaging findings, not a copy of dictation)  No new imaging    LABS:  Results from last 7 days   Lab Units 04/20/24  0712 04/18/24  0049 04/17/24  0506 04/15/24  2111   WBC AUTO x10*3/uL 8.4 9.9 9.0 10.6   HEMOGLOBIN g/dL 11.9* 11.8* 12.0 12.7   HEMATOCRIT % 37.2 33.7* 36.2 37.8   PLATELETS AUTO x10*3/uL 314 330 312 309   NEUTROS PCT AUTO % 69.0 65.1  --  66.0   LYMPHS PCT AUTO % 21.2 24.8  --  24.1   MONOS PCT AUTO % 6.5 6.9  --  7.2   EOS PCT AUTO % 2.5 2.4  --  1.9     Results from last 7 days   Lab Units 04/18/24  0148 04/15/24  2111   APTT seconds 22* 24.8   INR  1.1 1.0     Results from last 7 days   Lab Units 04/20/24  0712 04/18/24  0049 04/17/24  0506 04/15/24  2111   SODIUM mmol/L 139 138 138 137   POTASSIUM mmol/L 3.9 4.3 4.1 4.3   CHLORIDE mmol/L 105 103 102 100   CO2 mmol/L 25 25 25 24   BUN mg/dL 14 24* 17 19   CREATININE mg/dL 0.69 0.89 0.70 0.80   CALCIUM mg/dL 8.8 8.6 8.8 9.2   PROTEIN TOTAL g/dL  --  6.2*  --  7.5   BILIRUBIN TOTAL mg/dL  --  0.8  --  0.6   ALK PHOS U/L  --  98  --  121   ALT U/L  --  16  --  17   AST U/L  --  17  --  18   GLUCOSE mg/dL 110* 99 116* 102*     Results from last 7 days   Lab Units 04/18/24  0049 04/15/24  2111   BILIRUBIN TOTAL mg/dL 0.8 0.6         I have reviewed all medications, laboratory results, and imaging pertinent for today's encounter.

## 2024-04-20 NOTE — CARE PLAN
The patient's goals for the shift include      The clinical goals for the shift include To remain free from seizures, falls and injury    Over the shift, the patient is making adequate progress towards stated care plan goals

## 2024-04-20 NOTE — SIGNIFICANT EVENT
Epilepsy Team       Marybeth Duggan is a 78 y.o. female with PMH of HTN, HLD, hypothyroidism and recent fall with traumatic L. SDH ( 4/7-4/10) presented a transfer from [facility] with new onset intermittent aphasia and right hand weakness concerning for seizure. Exam significant for severe global aphasia and intact strength/sensation. Head and MRI imaging show improvement in SDH and MLS with no other acute change. Patient presentation of intermittent aphasia right arm and right facial weakness and numbness concerning for a focal seizure.     EEG did not show any epileptiform discharges.     Impression: Intermittent aphasia in pt with known prior left SDH, c/f seizure, possible post-ictal aphasia.     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     Epilepsy team will sign-off.     Please page with any further questions or concerns.   Epilepsy team pager: [phone]     Panda Hurley MD  Neurology Resident PGY3

## 2024-04-21 PROCEDURE — 1200000002 HC GENERAL ROOM WITH TELEMETRY DAILY

## 2024-04-21 PROCEDURE — 2500000006 HC RX 250 W HCPCS SELF ADMINISTERED DRUGS (ALT 637 FOR ALL PAYERS): Mod: MUE

## 2024-04-21 PROCEDURE — 2500000001 HC RX 250 WO HCPCS SELF ADMINISTERED DRUGS (ALT 637 FOR MEDICARE OP)

## 2024-04-21 PROCEDURE — 2500000004 HC RX 250 GENERAL PHARMACY W/ HCPCS (ALT 636 FOR OP/ED)

## 2024-04-21 PROCEDURE — 97165 OT EVAL LOW COMPLEX 30 MIN: CPT | Mod: GO

## 2024-04-21 PROCEDURE — 99232 SBSQ HOSP IP/OBS MODERATE 35: CPT | Performed by: SURGERY

## 2024-04-21 PROCEDURE — 2500000006 HC RX 250 W HCPCS SELF ADMINISTERED DRUGS (ALT 637 FOR ALL PAYERS)

## 2024-04-21 RX ORDER — METOPROLOL TARTRATE 25 MG/1
25 TABLET, FILM COATED ORAL 2 TIMES DAILY
Status: DISCONTINUED | OUTPATIENT
Start: 2024-04-21 | End: 2024-04-22 | Stop reason: HOSPADM

## 2024-04-21 RX ORDER — METOPROLOL TARTRATE 50 MG/1
50 TABLET ORAL 2 TIMES DAILY
Status: DISCONTINUED | OUTPATIENT
Start: 2024-04-21 | End: 2024-04-21

## 2024-04-21 RX ADMIN — ACETAMINOPHEN 975 MG: 325 TABLET ORAL at 14:47

## 2024-04-21 RX ADMIN — METOPROLOL TARTRATE 25 MG: 25 TABLET, FILM COATED ORAL at 20:35

## 2024-04-21 RX ADMIN — METOPROLOL TARTRATE 25 MG: 25 TABLET, FILM COATED ORAL at 08:48

## 2024-04-21 RX ADMIN — LEVETIRACETAM 750 MG: 500 INJECTION, SOLUTION INTRAVENOUS at 01:02

## 2024-04-21 RX ADMIN — LEVETIRACETAM 750 MG: 500 INJECTION, SOLUTION INTRAVENOUS at 13:01

## 2024-04-21 RX ADMIN — SENNOSIDES AND DOCUSATE SODIUM 2 TABLET: 8.6; 5 TABLET ORAL at 20:35

## 2024-04-21 RX ADMIN — LEVOTHYROXINE SODIUM 88 MCG: 0.09 TABLET ORAL at 08:48

## 2024-04-21 RX ADMIN — ATORVASTATIN CALCIUM 80 MG: 80 TABLET, FILM COATED ORAL at 20:35

## 2024-04-21 RX ADMIN — ENOXAPARIN SODIUM 30 MG: 100 INJECTION SUBCUTANEOUS at 08:48

## 2024-04-21 RX ADMIN — ENOXAPARIN SODIUM 30 MG: 100 INJECTION SUBCUTANEOUS at 20:35

## 2024-04-21 RX ADMIN — PAROXETINE 20 MG: 20 TABLET, FILM COATED ORAL at 08:48

## 2024-04-21 ASSESSMENT — PAIN - FUNCTIONAL ASSESSMENT
PAIN_FUNCTIONAL_ASSESSMENT: 0-10

## 2024-04-21 ASSESSMENT — COGNITIVE AND FUNCTIONAL STATUS - GENERAL
TOILETING: A LITTLE
MOVING FROM LYING ON BACK TO SITTING ON SIDE OF FLAT BED WITH BEDRAILS: A LITTLE
TOILETING: A LITTLE
DRESSING REGULAR LOWER BODY CLOTHING: A LOT
HELP NEEDED FOR BATHING: A LOT
DRESSING REGULAR LOWER BODY CLOTHING: A LOT
DRESSING REGULAR UPPER BODY CLOTHING: A LITTLE
HELP NEEDED FOR BATHING: A LOT
DAILY ACTIVITIY SCORE: 17
PERSONAL GROOMING: A LITTLE
MOVING FROM LYING ON BACK TO SITTING ON SIDE OF FLAT BED WITH BEDRAILS: A LITTLE
MOBILITY SCORE: 18
WALKING IN HOSPITAL ROOM: A LITTLE
DAILY ACTIVITIY SCORE: 17
TURNING FROM BACK TO SIDE WHILE IN FLAT BAD: A LITTLE
MOVING TO AND FROM BED TO CHAIR: A LITTLE
PERSONAL GROOMING: A LITTLE
WALKING IN HOSPITAL ROOM: A LITTLE
MOBILITY SCORE: 18
STANDING UP FROM CHAIR USING ARMS: A LITTLE
TURNING FROM BACK TO SIDE WHILE IN FLAT BAD: A LITTLE
CLIMB 3 TO 5 STEPS WITH RAILING: A LITTLE
MOVING TO AND FROM BED TO CHAIR: A LITTLE
DRESSING REGULAR UPPER BODY CLOTHING: A LITTLE
STANDING UP FROM CHAIR USING ARMS: A LITTLE
CLIMB 3 TO 5 STEPS WITH RAILING: A LITTLE

## 2024-04-21 ASSESSMENT — PAIN SCALES - GENERAL
PAINLEVEL_OUTOF10: 0 - NO PAIN

## 2024-04-21 ASSESSMENT — ACTIVITIES OF DAILY LIVING (ADL)
ADL_ASSISTANCE: INDEPENDENT
BATHING_ASSISTANCE: MINIMAL

## 2024-04-21 NOTE — CARE PLAN
Problem: Pain  Goal: My pain/discomfort is manageable  Outcome: Progressing     Problem: Safety  Goal: Patient will be injury free during hospitalization  Outcome: Progressing  Goal: I will remain free of falls  Outcome: Progressing     Problem: Daily Care  Goal: Daily care needs are met  Outcome: Progressing     Problem: Psychosocial Needs  Goal: Demonstrates ability to cope with hospitalization/illness  Outcome: Progressing  Goal: Collaborate with me, my family, and caregiver to identify my specific goals  Outcome: Progressing     Problem: Discharge Barriers  Goal: My discharge needs are met  Outcome: Progressing     Problem: Pain - Adult  Goal: Verbalizes/displays adequate comfort level or baseline comfort level  Outcome: Progressing     Problem: Safety - Adult  Goal: Free from fall injury  Outcome: Progressing     Problem: Discharge Planning  Goal: Discharge to home or other facility with appropriate resources  Outcome: Progressing     Problem: Chronic Conditions and Co-morbidities  Goal: Patient's chronic conditions and co-morbidity symptoms are monitored and maintained or improved  Outcome: Progressing     Problem: Skin  Goal: Participates in plan/prevention/treatment measures  Outcome: Progressing  Goal: Prevent/minimize sheer/friction injuries  Outcome: Progressing  Goal: Promote/optimize nutrition  Outcome: Progressing  Goal: Promote skin healing  Outcome: Progressing     Problem: Fall/Injury  Goal: Not fall by end of shift  Outcome: Progressing  Goal: Be free from injury by end of the shift  Outcome: Progressing  Goal: Verbalize understanding of personal risk factors for fall in the hospital  Outcome: Progressing  Goal: Verbalize understanding of risk factor reduction measures to prevent injury from fall in the home  Outcome: Progressing  Goal: Use assistive devices by end of the shift  Outcome: Progressing  Goal: Pace activities to prevent fatigue by end of the shift  Outcome: Progressing     Problem:  Pain  Goal: Takes deep breaths with improved pain control throughout the shift  Outcome: Progressing  Goal: Turns in bed with improved pain control throughout the shift  Outcome: Progressing  Goal: Walks with improved pain control throughout the shift  Outcome: Progressing  Goal: Performs ADL's with improved pain control throughout shift  Outcome: Progressing  Goal: Participates in PT with improved pain control throughout the shift  Outcome: Progressing  Goal: Free from opioid side effects throughout the shift  Outcome: Progressing  Goal: Free from acute confusion related to pain meds throughout the shift  Outcome: Progressing   The patient's goals for the shift include      The clinical goals for the shift include To remain free from fall/injury and increase activity    Over the shift, the patient did not make progress toward the following goals. Barriers to progression include language. Recommendations to address these barriers include slowly explain or write or google

## 2024-04-21 NOTE — PROGRESS NOTES
Marybeth Duggan is a 78 y.o. female on day 4 of admission presenting with Fluctuating mental status.    Per MD, mbr medically ready for DC home when approved by [facility].    Message sent to on call nurse.    Nasra Magana RN TCC weekend  epic messenger      [facility] able to accept and SOC 24-48 hours.

## 2024-04-21 NOTE — CARE PLAN
The patient's goals for the shift include      The clinical goals for the shift include To remain free from fall/injury and increase activity    Over the shift, the patient is making adequate progress towards stated care plan goals

## 2024-04-21 NOTE — CARE PLAN
Problem: Pain  Goal: My pain/discomfort is manageable  Outcome: Progressing     Problem: Safety  Goal: Patient will be injury free during hospitalization  Outcome: Progressing  Goal: I will remain free of falls  Outcome: Progressing     Problem: Daily Care  Goal: Daily care needs are met  Outcome: Progressing     Problem: Psychosocial Needs  Goal: Demonstrates ability to cope with hospitalization/illness  Outcome: Progressing  Goal: Collaborate with me, my family, and caregiver to identify my specific goals  Outcome: Progressing     Problem: Discharge Barriers  Goal: My discharge needs are met  Outcome: Progressing     Problem: Pain - Adult  Goal: Verbalizes/displays adequate comfort level or baseline comfort level  Outcome: Progressing     Problem: Safety - Adult  Goal: Free from fall injury  Outcome: Progressing     Problem: Discharge Planning  Goal: Discharge to home or other facility with appropriate resources  Outcome: Progressing     Problem: Chronic Conditions and Co-morbidities  Goal: Patient's chronic conditions and co-morbidity symptoms are monitored and maintained or improved  Outcome: Progressing     Problem: Skin  Goal: Participates in plan/prevention/treatment measures  Outcome: Progressing  Goal: Prevent/minimize sheer/friction injuries  Outcome: Progressing  Goal: Promote/optimize nutrition  Outcome: Progressing  Goal: Promote skin healing  Outcome: Progressing     Problem: Fall/Injury  Goal: Not fall by end of shift  Outcome: Progressing  Goal: Be free from injury by end of the shift  Outcome: Progressing  Goal: Verbalize understanding of personal risk factors for fall in the hospital  Outcome: Progressing  Goal: Verbalize understanding of risk factor reduction measures to prevent injury from fall in the home  Outcome: Progressing  Goal: Use assistive devices by end of the shift  Outcome: Progressing  Goal: Pace activities to prevent fatigue by end of the shift  Outcome: Progressing     Problem:  Pain  Goal: Takes deep breaths with improved pain control throughout the shift  Outcome: Progressing  Goal: Turns in bed with improved pain control throughout the shift  Outcome: Progressing  Goal: Walks with improved pain control throughout the shift  Outcome: Progressing  Goal: Performs ADL's with improved pain control throughout shift  Outcome: Progressing  Goal: Participates in PT with improved pain control throughout the shift  Outcome: Progressing  Goal: Free from opioid side effects throughout the shift  Outcome: Progressing  Goal: Free from acute confusion related to pain meds throughout the shift  Outcome: Progressing   The patient's goals for the shift include      The clinical goals for the shift include To remain free from seizures, falls and injury    Over the shift, the patient did not make progress toward the following goals. Barriers to progression include language Recommendations to address these barriers include interpretor

## 2024-04-21 NOTE — PROGRESS NOTES
[facility]  TRAUMA ICU - PROGRESS NOTE    Patient Name: Marybeth Duggan  MRN: [id number]  Admit Date: 417  : 1945  AGE: 78 y.o.   GENDER: female  ==============================================================================  MECHANISM OF INJURY:   Marybeth Duggan is a 77 y/o primarily [place]-speaking female who was recently admitted to the trauma service from  - 4/10 after a fall backwards from standing off 1 step and subsequent left SDH. Pt was discharged in stable condition. Family reports episodes of expressive aphasia and right side weakness that started 4/15. [facility] .  Patient was transferred to [facility] for higher level of care and vEEG.    LOC (yes/no?): No  Anticoagulant / Anti-platelet Rx? (for what dx?): ASA (hx of CAD)  Referring Facility Name (N/A for scene EMR run): [facility]    INJURIES:   Recent traumatic left subdural hematoma, 7 mm thickness, with 5 mm left-to-right midline shift 2/2 fall backwards off 1 step (hospitalized on trauma service from  - 4/10), stable with slight interval improvement  Residual Superficial Hematoma of Left Scalp related to above    OTHER MEDICAL PROBLEMS:  Hypertension  Hyperlipidemia  Hypothyroidism  Coronary Artery Disease (on ASA 81 mg for cardioprotection)  Urinary Incontinence  Anxiety  Osteoarthritis    INCIDENTAL FINDINGS:  None    PROCEDURES:      ==============================================================================  TODAY'S ASSESSMENT AND PLAN OF CARE:      Traumatic SDH  - CT head  stable, no need for further imaging per NSGY   -> DVT Proph can be resumed, will hold ASA until  (14 days post bleed)   -> No neurosurgical interventions indicated   -> Tylenol PRN for headaches and pain  -PM&R consulted, stated that patient is not candidate for TBI rehab at this time. Would be better suited for SNF/vs home with therapy     TIA vs  Stroke  - Neurology/Epilepsy consulted   -> Continue Keppra 750 BID    -> Continue vEEG at this time   - > EEG without any epileptiform changes; possible post-ictal aphasia   --> follow up with epilepsy as outpatient, epilepsy team signed off at this time     RESPIRATORY:   - No active issues  - SpO2 > 92%, currently on room air  - Incentive Spirometry Q1H while awake    CARDIOVASC:   - telemtry monitoring   - SBP  mmHg, MAP > 65  - Will resume metoprolol with hold parameters  - Restarting Atorvastatin 80mg daily    GI:   - Reg diet  - BR  - No active issues    :   - Will discontinue mIVF when taking adequate PO intake  -  No active issues    DVT PROPHYLAXIS:   - Lovenox 30mg BID    Dispo: RNF. Family requesting SNF at this time. Will reach out to social work for SNF options.     D/w attending surgeon Dr. Zelaya.   ==============================================================================  CHIEF COMPLAINT / OVERNIGHT EVENTS / HPI:   No acute events overnight. Tolerating regular diet, voiding adequately.     MEDICAL HISTORY / ROS:  Admission history and ROS reviewed. Pertinent changes as follows:      PHYSICAL EXAM:  Heart Rate:  []   Temp:  [36.3 °C (97.4 °F)-37.3 °C (99.1 °F)]   Resp:  [17-20]   BP: (117-181)/(74-92)   SpO2:  [94 %-97 %]   Physical Exam  Constitutional:       Appearance: She is not toxic-appearing.   HENT:      Head: Normocephalic.      Comments: Old bruising to left side of face     Right Ear: External ear normal.      Left Ear: External ear normal.      Mouth/Throat:      Mouth: Mucous membranes are dry.   Eyes:      Conjunctiva/sclera: Conjunctivae normal.   Cardiovascular:      Rate and Rhythm: Regular rhythm. Bradycardia present.      Pulses: Normal pulses.   Pulmonary:      Breath sounds: Normal breath sounds.   Abdominal:      Palpations: Abdomen is soft.   Musculoskeletal:         General: Normal range of motion.      Cervical back: Normal range of motion. No tenderness.    Skin:     General: Skin is warm and dry.      Capillary Refill: Capillary refill takes less than 2 seconds.   Neurological:      General: No focal deficit present.      Mental Status: She is alert and oriented to person, place, and time.      Motor: No weakness.      Comments: Able to answer questions appropriately when using ILIANA to translate   Psychiatric:         Behavior: Behavior normal.         IMAGING SUMMARY:  (summary of new imaging findings, not a copy of dictation)  No new imaging    LABS:  Results from last 7 days   Lab Units 04/20/24  0712 04/18/24  0049 04/17/24  0506 04/15/24  2111   WBC AUTO x10*3/uL 8.4 9.9 9.0 10.6   HEMOGLOBIN g/dL 11.9* 11.8* 12.0 12.7   HEMATOCRIT % 37.2 33.7* 36.2 37.8   PLATELETS AUTO x10*3/uL 314 330 312 309   NEUTROS PCT AUTO % 69.0 65.1  --  66.0   LYMPHS PCT AUTO % 21.2 24.8  --  24.1   MONOS PCT AUTO % 6.5 6.9  --  7.2   EOS PCT AUTO % 2.5 2.4  --  1.9     Results from last 7 days   Lab Units 04/18/24  0148 04/15/24  2111   APTT seconds 22* 24.8   INR  1.1 1.0     Results from last 7 days   Lab Units 04/20/24  0712 04/18/24  0049 04/17/24  0506 04/15/24  2111   SODIUM mmol/L 139 138 138 137   POTASSIUM mmol/L 3.9 4.3 4.1 4.3   CHLORIDE mmol/L 105 103 102 100   CO2 mmol/L 25 25 25 24   BUN mg/dL 14 24* 17 19   CREATININE mg/dL 0.69 0.89 0.70 0.80   CALCIUM mg/dL 8.8 8.6 8.8 9.2   PROTEIN TOTAL g/dL  --  6.2*  --  7.5   BILIRUBIN TOTAL mg/dL  --  0.8  --  0.6   ALK PHOS U/L  --  98  --  121   ALT U/L  --  16  --  17   AST U/L  --  17  --  18   GLUCOSE mg/dL 110* 99 116* 102*     Results from last 7 days   Lab Units 04/18/24  0049 04/15/24  2111   BILIRUBIN TOTAL mg/dL 0.8 0.6

## 2024-04-21 NOTE — PROGRESS NOTES
Occupational Therapy    Evaluation    Patient Name: Marybeth Duggan  MRN: [id number]  Today's Date: 4/21/2024  Time Calculation  Start Time: 0942  Stop Time: 1001  Time Calculation (min): 19 min        Assessment:  Prognosis: Good  Barriers to Discharge: None  Evaluation/Treatment Tolerance: Patient tolerated treatment well  Medical Staff Made Aware: Yes  End of Session Communication: Bedside nurse, PCT/NA/CTA  End of Session Patient Position: Up in chair, Alarm on  OT Assessment Results: Decreased ADL status, Decreased endurance, Decreased functional mobility  Prognosis: Good  Barriers to Discharge: None  Evaluation/Treatment Tolerance: Patient tolerated treatment well  Medical Staff Made Aware: Yes  Strengths: Ability to acquire knowledge, Attitude of self, Premorbid level of function, Support of Caregivers  Barriers to Participation: Comorbidities  Plan:  Treatment Interventions: ADL retraining, Functional transfer training, Endurance training, Patient/family training, Equipment evaluation/education, Compensatory technique education  OT Frequency: 2 times per week  OT Discharge Recommendations: Low intensity level of continued care  Equipment Recommended upon Discharge:  (pt owns all necessary DME from OT standpoint)  OT Recommended Transfer Status: Assist of 1  OT - OK to Discharge:  (eval complete)  Treatment Interventions: ADL retraining, Functional transfer training, Endurance training, Patient/family training, Equipment evaluation/education, Compensatory technique education    Subjective   Current Problem:  1. Fluctuating mental status  EEG        General:  General  Reason for Referral: Recent fall, pw aphasia, RUE weakness and possible sz activity, AMS  Past Medical History Relevant to Rehab: PMH of HTN, HLD, hypothyroidism and recent fall with traumatic L. SDH ( 4/7-4/10)  Family/Caregiver Present: No  Prior to Session Communication: Bedside nurse  Patient Position Received: Bed, 3 rail up, Alarm off, not on  at start of session  Preferred Learning Style: visual  General Comment: Pleasant and agreeable to therapy. Limited by language barrier (speaks Greek, MARTII down), utilized google translate as well as gestures to communicate  Precautions:  Medical Precautions: Fall precautions, Seizure precautions  Precautions Comment: SBP     Pain:  Pain Assessment  Pain Assessment: 0-10  Pain Score: 0 - No pain    Objective   Cognition:  Overall Cognitive Status: Within Functional Limits  Arousal/Alertness: Appropriate responses to stimuli  Orientation Level:  (grossly assessed, appears WNL)  Following Commands: Follows one step commands without difficulty  Safety Judgment: Decreased awareness of need for safety precautions  Problem Solving: Assistance required to identify errors made  Insight: Mild           Home Living:  Type of Home: House  Lives With:  (lives alone at baseline, has been staying at dtr's home since previous d/c)  Home Adaptive Equipment: Cane, Walker rolling or standard  Home Layout: One level  Home Access: Stairs to enter with rails  Entrance Stairs-Number of Steps: 4  Prior Function:  Level of [place]: Independent with ADLs and functional transfers, Needs assistance with homemaking  Receives Help From: Family (dtr)  ADL Assistance: Independent  Homemaking Assistance:  (dtr assists)  Ambulatory Assistance:  (uses cane at baseline)    ADL:  Eating Assistance: Independent  Grooming Assistance: Stand by  Grooming Deficit: Supervision/safety (standing hand hygiene at sink)  Bathing Assistance: Minimal  Bathing Deficit:  (anticipated for safety)  UE Dressing Assistance: Stand by  UE Dressing Deficit:  (gown)  LE Dressing Assistance: Moderate  LE Dressing Deficit:  (socks)  Toileting Assistance with Device: Minimal  Toileting Deficit:  (aldair care and clothing management)  Activity Tolerance:  Endurance: Tolerates 10 - 20 min exercise with multiple rests  Bed Mobility/Transfers: Bed Mobility  Bed  Mobility: Yes  Bed Mobility 1  Bed Mobility 1: Supine to sitting  Level of Assistance 1: Close supervision  Bed Mobility Comments 1: HOB elevated, visual cueing for positioning    Transfers  Transfer: Yes  Transfer 1  Transfer From 1: Bed to  Transfer to 1: Stand  Technique 1: Sit to stand  Transfer Device 1: Walker  Transfer Level of Assistance 1: Contact guard  Trials/Comments 1: Instructions for hand placement  Transfers 2  Transfer From 2: Commode-standard to  Transfer to 2: Sit, Stand  Technique 2: Sit to stand, Stand to sit  Transfer Device 2: Walker  Transfer Level of Assistance 2: Contact guard  Trials/Comments 2: tactile cueing for use of grab bar to assist  Transfers 3  Transfer From 3: Stand to  Transfer to 3: Chair with arms  Technique 3: Stand to sit  Transfer Device 3: Walker  Transfer Level of Assistance 3:  (SBA)  Trials/Comments 3: Tactile cueing for positioning for controlled seated descent      Functional Mobility:  Functional Mobility  Functional Mobility Performed: Yes  Functional Mobility 1  Comments 1: Pt completed funcitonal distance to/from restroom with SBA to CGA, intermittent visual cueing for positioning within walker and device management with obstacles  Sitting Balance:  Static Sitting Balance  Static Sitting-Balance Support: No upper extremity supported  Static Sitting-Level of Assistance: Close supervision     Vision:Vision - Basic Assessment  Current Vision: No visual deficits  Sensation:  Light Touch:  (limited assessment 2/2 language barrier)  Strength:  Strength Comments: Functional strength grossly WFL  Perception:  Inattention/Neglect: Appears intact  Initiation: Appears intact  Motor Planning: Appears intact  Perseveration: Not present  Coordination:  Movements are Fluid and Coordinated: Yes   Hand Function:  Gross Grasp: Functional  Coordination: Functional  Extremities: RUE   RUE : Within Functional Limits and LUE   LUE: Within Functional Limits    Outcome Measures:[facility]  Daily Activity  Putting on and taking off regular lower body clothing: A lot  Bathing (including washing, rinsing, drying): A lot  Putting on and taking off regular upper body clothing: A little  Toileting, which includes using toilet, bedpan or urinal: A little  Taking care of personal grooming such as brushing teeth: A little  Eating Meals: None  Daily Activity - Total Score: 17    Brief Confusion Assessment Method (bCAM)  Feature 1: Altered Mental Status or Fluctuating Course: No  CAM Result: CAM -    Education Documentation  Body Mechanics, taught by Alessia Lei OT at 4/21/2024 12:59 PM.  Learner: Patient  Readiness: Acceptance  Method: Explanation, Demonstration  Response: Verbalizes Understanding, Demonstrated Understanding, Needs Reinforcement    ADL Training, taught by Alessia Lei OT at 4/21/2024 12:59 PM.  Learner: Patient  Readiness: Acceptance  Method: Explanation, Demonstration  Response: Verbalizes Understanding, Demonstrated Understanding, Needs Reinforcement    EDUCATION:  Education  Individual(s) Educated: Patient  Education Provided:  (role of therapy)  Patient Response to Education: Patient/Caregiver Verbalized Understanding of Information    Goals:  Encounter Problems       Encounter Problems (Active)       ADLs       Patient with complete lower body dressing with modified independent level of assistance donning and doffing all LE clothes  with PRN adaptive equipment (Progressing)       Start:  04/21/24    Expected End:  05/05/24            Patient will complete daily grooming tasks with modified independent level of assistance and PRN adaptive equipment while standing. (Progressing)       Start:  04/21/24    Expected End:  05/05/24            Patient will complete toileting including hygiene clothing management/hygiene with independent level of assistance. (Progressing)       Start:  04/21/24    Expected End:  05/05/24               MOBILITY       Patient will perform Functional  mobility Household distances/Community Distances with modified independent level of assistance and LRAD in order to improve safety and functional mobility. (Progressing)       Start:  04/21/24    Expected End:  05/05/24               TRANSFERS       Patient will perform bed mobility modified independent level of assistance in order to improve safety and independence with mobility (Progressing)       Start:  04/21/24    Expected End:  05/05/24            Patient will complete functional transfer to chair/commode with least restrictive device with modified independent level of assistance. (Progressing)       Start:  04/21/24    Expected End:  05/05/24

## 2024-04-22 ENCOUNTER — DOCUMENTATION (OUTPATIENT)
Dept: HOME HEALTH SERVICES | Facility: HOME HEALTH | Age: 79
End: 2024-04-22

## 2024-04-22 ENCOUNTER — HOME HEALTH ADMISSION (OUTPATIENT)
Dept: HOME HEALTH SERVICES | Facility: HOME HEALTH | Age: 79
End: 2024-04-22
Payer: MEDICARE

## 2024-04-22 VITALS
RESPIRATION RATE: 18 BRPM | TEMPERATURE: 97.7 F | HEART RATE: 78 BPM | SYSTOLIC BLOOD PRESSURE: 143 MMHG | BODY MASS INDEX: 36.66 KG/M2 | WEIGHT: 214.73 LBS | DIASTOLIC BLOOD PRESSURE: 83 MMHG | HEIGHT: 64 IN | OXYGEN SATURATION: 96 %

## 2024-04-22 PROCEDURE — 2500000001 HC RX 250 WO HCPCS SELF ADMINISTERED DRUGS (ALT 637 FOR MEDICARE OP)

## 2024-04-22 PROCEDURE — 2500000006 HC RX 250 W HCPCS SELF ADMINISTERED DRUGS (ALT 637 FOR ALL PAYERS)

## 2024-04-22 PROCEDURE — 2500000006 HC RX 250 W HCPCS SELF ADMINISTERED DRUGS (ALT 637 FOR ALL PAYERS): Mod: MUE

## 2024-04-22 PROCEDURE — 99238 HOSP IP/OBS DSCHRG MGMT 30/<: CPT | Performed by: SURGERY

## 2024-04-22 PROCEDURE — 2500000004 HC RX 250 GENERAL PHARMACY W/ HCPCS (ALT 636 FOR OP/ED)

## 2024-04-22 PROCEDURE — 99232 SBSQ HOSP IP/OBS MODERATE 35: CPT | Performed by: INTERNAL MEDICINE

## 2024-04-22 PROCEDURE — 97530 THERAPEUTIC ACTIVITIES: CPT | Mod: GP | Performed by: STUDENT IN AN ORGANIZED HEALTH CARE EDUCATION/TRAINING PROGRAM

## 2024-04-22 PROCEDURE — 97116 GAIT TRAINING THERAPY: CPT | Mod: GP | Performed by: STUDENT IN AN ORGANIZED HEALTH CARE EDUCATION/TRAINING PROGRAM

## 2024-04-22 RX ORDER — LEVETIRACETAM 750 MG/1
750 TABLET ORAL 2 TIMES DAILY
Qty: 60 TABLET | Refills: 0 | Status: SHIPPED | OUTPATIENT
Start: 2024-04-22 | End: 2024-04-26 | Stop reason: ALTCHOICE

## 2024-04-22 RX ORDER — LEVETIRACETAM 750 MG/1
750 TABLET ORAL 2 TIMES DAILY
Qty: 200 TABLET | Refills: 0 | Status: SHIPPED | OUTPATIENT
Start: 2024-04-22 | End: 2024-04-26 | Stop reason: ALTCHOICE

## 2024-04-22 RX ORDER — LEVETIRACETAM 750 MG/1
750 TABLET ORAL 2 TIMES DAILY
Qty: 60 TABLET | Refills: 1 | Status: SHIPPED | OUTPATIENT
Start: 2024-04-22 | End: 2024-05-29 | Stop reason: ALTCHOICE

## 2024-04-22 RX ADMIN — ACETAMINOPHEN 975 MG: 325 TABLET ORAL at 12:26

## 2024-04-22 RX ADMIN — PAROXETINE 20 MG: 20 TABLET, FILM COATED ORAL at 09:27

## 2024-04-22 RX ADMIN — LEVOTHYROXINE SODIUM 88 MCG: 0.09 TABLET ORAL at 09:27

## 2024-04-22 RX ADMIN — LEVETIRACETAM 750 MG: 500 INJECTION, SOLUTION INTRAVENOUS at 01:39

## 2024-04-22 RX ADMIN — ENOXAPARIN SODIUM 30 MG: 100 INJECTION SUBCUTANEOUS at 09:27

## 2024-04-22 RX ADMIN — METOPROLOL TARTRATE 25 MG: 25 TABLET, FILM COATED ORAL at 09:27

## 2024-04-22 ASSESSMENT — PAIN - FUNCTIONAL ASSESSMENT
PAIN_FUNCTIONAL_ASSESSMENT: 0-10
PAIN_FUNCTIONAL_ASSESSMENT: 0-10

## 2024-04-22 ASSESSMENT — COGNITIVE AND FUNCTIONAL STATUS - GENERAL
CLIMB 3 TO 5 STEPS WITH RAILING: A LITTLE
TURNING FROM BACK TO SIDE WHILE IN FLAT BAD: A LITTLE
WALKING IN HOSPITAL ROOM: A LITTLE
MOBILITY SCORE: 18
MOVING FROM LYING ON BACK TO SITTING ON SIDE OF FLAT BED WITH BEDRAILS: A LITTLE
STANDING UP FROM CHAIR USING ARMS: A LITTLE
MOVING TO AND FROM BED TO CHAIR: A LITTLE

## 2024-04-22 ASSESSMENT — PAIN SCALES - GENERAL
PAINLEVEL_OUTOF10: 5 - MODERATE PAIN
PAINLEVEL_OUTOF10: 0 - NO PAIN
PAINLEVEL_OUTOF10: 0 - NO PAIN

## 2024-04-22 NOTE — DISCHARGE SUMMARY
Discharge Diagnosis  Fluctuating mental status    Issues Requiring Follow-Up  Trauma Surgery Clinic  Neurosurgery Clinic  Epilepsy Clinic  Primary Care Provider     Test Results Pending At Discharge  Pending Labs       Order Current Status    Vitamin B1, whole blood In process            Hospital Course  Marybeth Duggan is a 79 y/o primarily [place]-speaking female who was recently admitted to the trauma service from 4/7 - 4/10 after a fall backwards from standing off 1 step and striking the left side of her head/face on the floor resulting in a traumatic left subdural hematoma and a superficial hematoma of her left scalp. She was neurologically intact throughout the course of her admission. CT Head imaging was stable. Evaluated by neurosurgery, non-operatively managed, recommended Keppra 500 mg BID x 7 days, holding home ASA until post-bleed day 14 (4/21), and 2-week outpatient follow-up with repeat CT Head at that time. Patient was discharged home with home health care/family assistance on 4/10. On discharge, she was neurologically intact, and medically stable.   Patient represented to the hospital on 4/15 after incoherent speech and new onset weakness of her upper extremity. She had no new episodes of fall, and had completed her 7-day course of Keppra. She underwent repeat CT and MRI, which was interpreted as stable compared to her most recent imaging on 4/8.   Patient was evaluated by neurology who recommend patient with continuous video EEG, as well as loading and maintenance with Keppra. Patient was evaluated by physical therapy and occupational therapy, who recommended to have low intensity level of continued care. Geriatrics also evaluated patient for assistance with medical management of her comorbidities. Patient did not have any epileptiform discharges on her video EEG during her hospital stay. Re-presentation with weakness and aphasia was likely secondary to possible post-ictal aphasia. At time of  discharge, patient was tolerating regular diet, ambulating appropriately, and voiding adequately. Pain was well-controlled. Patient will continue on keppra 750mg BID, and will follow up with Epilepsy outpatient. Patient will also follow up with Neurosurgery, Trauma Surgery clinic and her Primary Care Provider.     Pertinent Physical Exam At Time of Discharge  Constitutional: NAD  HEENT: old bruising to left side of face, small, resolving hematoma  Respiratory: unlabored breathing on room air  Cardiovascular: nontachycardic  Abdomen: soft, nontender, nondistended  Skin: warm and dry  MSK: normal ROM     Home Medications     Medication List      START taking these medications     levETIRAcetam 750 mg tablet; Commonly known as: Keppra; Take 1 tablet   (750 mg) by mouth 2 times a day.     CHANGE how you take these medications     levothyroxine 88 mcg tablet; Commonly known as: Synthroid, Levoxyl; Take   1 tablet (88 mcg) by mouth once daily.; What changed: when to take this   PARoxetine 20 mg tablet; Commonly known as: Paxil; Take 1 tablet (20 mg)   by mouth once daily.; What changed: when to take this     CONTINUE taking these medications     acetaminophen 325 mg tablet; Commonly known as: Tylenol; Take 2 by mouth   every 6 hours for 5 days.   atorvastatin 80 mg tablet; Commonly known as: Lipitor; TAKE 1 TABLET BY   MOUTH AT  BEDTIME   bacitracin 500 unit/gram ointment; Apply topically 3 times a day   cholecalciferol 25 MCG (1000 UT) tablet; Commonly known as: Vitamin D-3   metoprolol tartrate 50 mg tablet; Commonly known as: Lopressor; Take 1   tablet by mouth 2 times a day.       Outpatient Follow-Up  Future Appointments   Date Time Provider Department Lookout   4/26/2024  1:15 PM Sunday Goldsmith MD [id number] [facility]   9/25/2024  2:40 PM Ashwin Garay MD [id number] [facility]   Trauma Surgery Clinic  Neurosurgery Clinic  Epilepsy Clinic  Primary Care Provider     Diamante Gibbons MD

## 2024-04-22 NOTE — PROGRESS NOTES
Physical Therapy    Physical Therapy Treatment    Patient Name: Marybeth Duggan  MRN: [id number]  Today's Date: 4/22/2024  Room: [address] [place], [place] [address]A  Time Calculation  Start Time: 1250  Stop Time: 1330  Time Calculation (min): 40 min       Assessment/Plan   PT Assessment  End of Session Communication: Bedside nurse  End of Session Patient Position: Up in chair (family present)     PT Plan  Treatment/Interventions: Bed mobility, Transfer training, Gait training, Stair training, Balance training, Strengthening, Endurance training, Range of motion, Therapeutic exercise, Therapeutic activity  PT Plan: Skilled PT  PT Frequency: 5 times per week  PT Discharge Recommendations: Low intensity level of continued care  Equipment Recommended upon Discharge:  (Owns necessary equipment)  PT Recommended Transfer Status: Assist x1, Assistive device  PT - OK to Discharge: Yes      General Visit Information:   PT  Visit  PT Received On: 04/22/24  Prior to Session Communication: Bedside nurse  Patient Position Received: Bed, 3 rail up, Alarm off, not on at start of session  Family/Caregiver Present: Yes  Caregiver Feedback: daughters, expressing some concern with patient effort and motivation at home compared to mobilization in hospital.     Subjective   Subjective: Patient is alert, agreeable to PT.  In good spirits, family expressing some questions about home vs SNF based on functional capacity at home prior to admission.  Precautions:  Precautions  Medical Precautions: Fall precautions  Vital Signs:  Vital Signs  BP: 143/83 (sitting 153/81, standing 141/77)  Patient Position: Lying    Objective   Pain:  Pain Assessment  Pain Score: 5 - Moderate pain (post 5)  Pain Type: Acute pain  Pain Location: Head  Pain Orientation: Left  Cognition:  Cognition  Orientation Level: Oriented X4  Lines/Tubes/Drains:  External Urinary Catheter Female (Active)   Number of days: 6     Continuous Medications/Drips:       PT Treatments:     Therapeutic  Activity  Therapeutic Activity Performed: Yes  Therapeutic Activity 1: static stand eyes closed 15s x 3 supervision no UE support  Therapeutic Activity 2: 360 turn L no AD CGA x 2  Therapeutic Activity 3: 360 turn L c RW supervision x 1     Bed Mobility 1  Bed Mobility 1: Supine to sitting  Level of Assistance 1: Close supervision  Bed Mobility Comments 1: HOB 30, rail  Ambulation/Gait Training  Ambulation/Gait Training Performed: Yes  Ambulation/Gait Training 1  Surface 1: Level tile  Device 1: No device  Assistance 1: Close supervision  Quality of Gait 1:  (decreased gait speed, low guard)  Comments/Distance (ft) 1: 20' x 2  Ambulation/Gait Training 2  Surface 2: Level tile  Device 2: Rolling walker  Assistance 2: Close supervision  Quality of Gait 2:  (WFL)  Comments/Distance (ft) 2: 25' x 2, 80', 120'  Transfers  Transfer: Yes  Transfer 1  Transfer From 1: Sit to  Transfer to 1: Stand  Transfer Device 1:  (no device)  Transfer Level of Assistance 1: Close supervision  Trials/Comments 1: x3  Transfers 2  Transfer From 2: Stand to  Transfer to 2: Sit  Transfer Device 2:  (no device)  Transfer Level of Assistance 2: Close supervision  Trials/Comments 2: x3  Transfers 3  Transfer From 3: Bed to  Transfer to 3: Bed  Transfer Device 3:  (no device)  Transfer Level of Assistance 3: Close supervision  Trials/Comments 3: x2  Transfers 4  Transfer From 4: Sit to  Transfer to 4: Stand  Transfer Device 4: Walker  Transfer Level of Assistance 4: Close supervision  Trials/Comments 4: x4  Transfers 5  Transfer From 5: Stand to  Transfer to 5: Sit  Transfer Device 5: Walker  Transfer Level of Assistance 5: Close supervision  Trials/Comments 5: x4  Transfers 6  Transfer From 6: Bed to  Transfer to 6: Chair with arms  Transfer Device 6: Walker  Transfer Level of Assistance 6: Close supervision  Trials/Comments 6: x1  Stairs  Stairs: Yes  Stairs  Rails 1: Bilateral  Device 1: Railing  Assistance 1: Close  supervision  Comment/Number of Steps 1: 4 (step to)          Outcome Measures:  [facility] Basic Mobility  Turning from your back to your side while in a flat bed without using bedrails: A little  Moving from lying on your back to sitting on the side of a flat bed without using bedrails: A little  Moving to and from bed to chair (including a wheelchair): A little  Standing up from a chair using your arms (e.g. wheelchair or bedside chair): A little  To walk in hospital room: A little  Climbing 3-5 steps with railing: A little  Basic Mobility - Total Score: 18                 [place]  Sitting Balance: Steady, safe  Arises: Able, uses arms to help  Attempts to Arise: Able to arise, one attempt  Immediate Standing Balance (First 5 Seconds): Steady without walker or other support  Standing Balance: Steady but wide stance, uses cane or other support  Nudged: Steady without walker or other support  Eyes Closed: Steady  Turned 360 Degrees: Steadiness: Steady  Turned 360 Degrees: Continuity of Steps: Discontinuous steps  Sitting Down: Uses arms or not a smooth motion  Balance Score: 12  Initiation of Gait: No hesitancy  Step Height: R Swing Foot: Right foot complete clears floor  Step Length: R Swing Foot: Passes left stance foot  Step Height: L Swing Foot: Left foot complete clears floor  Step Length: L Swing Foot: Passes right stance foot  Step Symmetry: Right and left step appear equal  Step Continuity: Steps appear continuous  Path: Mild/moderate deviation or uses walking aid  Trunk: No sway but flexion of knees or back or spreads arms out while walking  Walking Time: Heels apart  Gait Score: 9  Total Score: 21          Education Documentation  Body Mechanics, taught by Arias Kirk PT at 4/22/2024  3:24 PM.  Learner: Patient  Readiness: Acceptance  Method: Explanation  Response: Needs Reinforcement    Mobility Training, taught by Arias Kirk PT at 4/22/2024  3:24 PM.  Learner: Patient  Readiness:  Acceptance  Method: Explanation  Response: Needs Reinforcement    Education Comments  No comments found.          OP EDUCATION:       Encounter Problems       Encounter Problems (Active)       PT Problem       Patient will complete supine to sit and sit to supine Supervision  (Progressing)       Start:  04/18/24    Expected End:  05/02/24            Patient will perform sit<>stand transfer with LRAD, and Supervision  (Progressing)       Start:  04/18/24    Expected End:  05/02/24            Patient will ambulate >150' with LRAD and Supervision  (Progressing)       Start:  04/18/24    Expected End:  05/02/24            Patient will ascend/descend 4 steps with Right Rail Ascending and supervision  (Progressing)       Start:  04/18/24    Expected End:  05/02/24               Pain - Adult              Assessment: Patient is progressing Well with therapy this date.  Patient able to complete multiple standing, gait, and balance activities this date.  No LOB noted, minimal WARNER with stable vitals main limiting factor.  Reviewed current functional presentation with patient and family in room.  Patient remains appropriate for LOW intensity therapy when medically appropriate for discharge from acute stay.  Will continue to follow.      04/22/24 at 3:25 PM   Arias Kirk, PT   Rehab Office: [phone]

## 2024-04-22 NOTE — PROGRESS NOTES
I refilled this patient's meds. Please call and let know.      Subjective   Follow-up on intermittent aphasia:    Patient was seen and examined.  No acute event overnight.  Patient stated that her pain was okay. Bps have been swinging.  No seizures seen in this admission.      Objective     Current Facility-Administered Medications   Medication Dose Route Frequency Provider Last Rate Last Admin    acetaminophen (Tylenol) tablet 975 mg  975 mg oral q6h PRN Ryan Paz DO   975 mg at 04/21/24 1447    atorvastatin (Lipitor) tablet 80 mg  80 mg oral Nightly Ahmad A Mejia, APRN-CNP   80 mg at 04/21/24 2035    dextrose 50 % injection 12.5 g  12.5 g intravenous q15 min PRN Ahmad A Mejia, APRN-CNP        dextrose 50 % injection 25 g  25 g intravenous q15 min PRN Ahmad A Mejia, APRN-CNP        enoxaparin (Lovenox) syringe 30 mg  30 mg subcutaneous q12h Ahmad A Mejia, APRN-CNP   30 mg at 04/22/24 0927    glucagon (Glucagen) injection 1 mg  1 mg intramuscular q15 min PRN Ahmad A Mejia, APRN-CNP        glucagon (Glucagen) injection 1 mg  1 mg intramuscular q15 min PRN Ahmad A Mejia, APRN-CNP        levETIRAcetam (Keppra) 750 mg in dextrose 5 % in water (D5W) 100 mL IVPB  750 mg intravenous q12h Ahmad A Mejia, APRN-CNP   Stopped at 04/22/24 0154    levothyroxine (Synthroid, Levoxyl) tablet 88 mcg  88 mcg oral Daily Ahmad A Mejia, APRN-CNP   88 mcg at 04/22/24 0927    metoprolol tartrate (Lopressor) tablet 25 mg  25 mg oral BID Diamante Gibbons MD   25 mg at 04/22/24 0927    oxyCODONE (Roxicodone) immediate release tablet 5 mg  5 mg oral q4h PRN Ryan Paz, DO   5 mg at 04/19/24 2226    oxygen (O2) therapy   inhalation Continuous PRN - O2/gases Ahmad A Mejia, APRN-CNP        PARoxetine (Paxil) tablet 20 mg  20 mg oral Daily Ahmad A Mejia, APRN-CNP   20 mg at 04/22/24 0927    sennosides-docusate sodium (Keely-Colace) 8.6-50 mg per tablet 2 tablet  2 tablet oral Nightly Ryan Paz DO   2 tablet at 04/21/24 2035       Physical Exam  GEN: Alert,  oriented to self, place and ?time, not pale, not jaundiced, well hydrated, large hematoma over the left frontotemporal region  CVS: HS I +II, regular, no murmurs  RESP: Diminished air entry  ABD: Full, soft, BS present, nontender, no palpable organs,   EXT: No bilateral leg edema    Confusion Assessment Method(CAM) for diagnosis of delirium:    1.  Acute onset or fluctuating course: absent/present: Absent  2.  Inattention: absent/present: Absent  3.  Disorganized thinking: absent/present: Absent  4.  Altered level of consciousness: absent/present: Absent  CAM: negative    AT Score For Assessment of Delirium and Cognitive Impairment:    Alertness: 0  Normal(fully alert,but not agitated, throughout assessment)=0  Mild sleepiness for <10 seconds after walking, then normal=0  Clearly abnormal=4  2.  AMT4: 0  No mistakes=0  One mistake=1  Two or more mistakes/untestable=2  3.  Attention: 0  Achieves seven months or more correctly=0  Starts but scores <7 months/ refuses to start=1  Untestable(cannot start because unwell, drowsy, inattentive)=2  4.  Acute: 0  No=0  Yes=4    Total Score: 0  4 or above: Possible delirium +/- cognitive impairment  1-3: Possible cognitive impairment  0: Delirium or severe cognitive impairment unlikely(but delirium still possible if (4) information incomplete)      Last Recorded Vitals      4/21/2024    11:37 AM 4/21/2024     3:06 PM 4/21/2024     7:50 PM 4/22/2024    12:05 AM 4/22/2024     3:10 AM 4/22/2024     7:00 AM 4/22/2024     9:21 AM   Vitals   Systolic 117 151 128 149 142 117 179   Diastolic 76 84 75 80 80 68 89   Heart Rate 75 69 69 65 72 85 78   Temp 37 °C (98.6 °F) 36.8 °C (98.2 °F) 36.6 °C (97.9 °F) 36.4 °C (97.5 °F) 36.3 °C (97.3 °F) 37.4 °C (99.3 °F) 36.5 °C (97.7 °F)   Resp 17 17 17 17 17 17 18      Vitals:    04/18/24 0000   Weight: 97.4 kg (214 lb 11.7 oz)        Relevant Results  Lab Results   Component Value Date    TSH 3.89 04/17/2024    [id number] 274 04/18/2024    VITD25  52 11/09/2023    HGBA1C 6.3 (H) 03/13/2024     Results from last 7 days   Lab Units 04/20/24  0712 04/18/24  0148 04/18/24  0049 04/17/24  0506 04/15/24  2111   WBC AUTO x10*3/uL 8.4  --  9.9 9.0 10.6   HEMOGLOBIN g/dL 11.9*  --  11.8* 12.0 12.7   HEMATOCRIT % 37.2  --  33.7* 36.2 37.8   CHOLESTEROL mg/dL  --   --   --  133  --    TRIGLYCERIDES mg/dL  --   --   --  108  --    HDL mg/dL  --   --   --  43.0*  --    ALT U/L  --   --  16  --  17   AST U/L  --   --  17  --  18   SODIUM mmol/L 139  --  138 138 137   POTASSIUM mmol/L 3.9  --  4.3 4.1 4.3   CHLORIDE mmol/L 105  --  103 102 100   CREATININE mg/dL 0.69  --  0.89 0.70 0.80   BUN mg/dL 14  --  24* 17 19   CO2 mmol/L 25  --  25 25 24   INR   --  1.1  --   --  1.0          EEG  IMPRESSION    This vEEG is normal. No epileptiform discharges or lateralizing signs are seen.    A full report will be scanned into the patient's chart at a later time.    This report has been interpreted and electronically signed by    DATA:  EKG: QTC  Encounter Date: 04/15/24   ECG 12 lead   Result Value    Ventricular Rate 66    Atrial Rate 66    NE Interval 152    QRS Duration 76    QT Interval 406    QTC Calculation(Bazett) 425    P Axis 42    R Axis -10    T Axis 24    QRS Count 10    Q Onset 221    P Onset 145    P Offset 203    T Offset 424    QTC Fredericia 419    Narrative    Sinus rhythm with frequent atrial-paced complexes  low  voltage precordial leads  Inferior infarct , age undetermined  Abnormal ECG  When compared with ECG of 07-MAR-2023 14:59,    Inferior infarct is now Present  clinical correlation  Confirmed by Miguel Nicholson ([id number]) on 4/16/2024 12:51:11 PM      Anti-psychotics in 48 hours: No  Opioids/Benzodiazepines in 48 hours: No  Anticholinergics on board:Yes - Paxil  Restraints:No  Indwelling catheters:No  Last BM:4/21/24  UO in 24 hours:875 mls  Activity in the past 24 hours:Out of bed to chair  Need for ambulatory devices: walker       Assessment/Plan   78 y.o.  female, with past medical history relevant for recent SDH on 4/7/24, presenting for intermittent confusion, aphasia, being seen in geriatric consultation for re-admission.        Principal Problem:    Fluctuating mental status    1. Intermittent expressive aphasia, right facial and upper extremity weakness, concerning for focal seizure, likely related to traumatic subdural hematoma, no seizures seen this admission  2. Recent Acute fall, 2nd fall in 6 months  3. Acute left subdural hematoma  4. Left scalp superficial hematoma  5. Hypertension, fairly uncontrolled  6. Hyperlipidemia  7 Hypothyroidism  8. CAD  9. Anxiety  10. Osteoarthritis     Plan:  Continue with Keppra BID  Continue metoprolol 25 mg twice daily   Recommend checking orthostatic vitals prior to discharge   Okay with discharge.  PT and OT evaluation to home with home health   Continue to recommend Tylenol 975 3 times daily for pain control for couple of days and then as needed   SW consult follow-up with daughter for healthcare power of  paperwork     Care Transitions:  -Recommended level for discharge: Low intensity level of continued care  -Home going considerations: Patient is an assist x1  -Primary care physician: Dr. Ashwin Garay     Goals of Care:  -Health care power of :None  -Living will:No  -Code status: Full code - daughter to discuss with patient     4M AGE-FRIENDLY INITIATIVE:  What matters most to patient: Her health  Medications: Keppra, Paxil  Mentation: Cam negative, 4AT 0  Mobility: Assist x1, PT/OT petra, [facility] 19,        Geriatric medicine will continue to follow the patient. Thank you for allowing geriatric medicine to be involved in the care of your patient. Geriatric medicine consultation team is available during work hours Monday through Friday. For any emergency issues requiring immediate assistance over the weekend, please page Geriatrics pager [phone]    Sherri Ervin MD

## 2024-04-22 NOTE — DISCHARGE INSTRUCTIONS
[facility]  DISCHARGE INSTRUCTIONS    You were admitted to [facility] from 4/16/24 - 4/22/24 for management of your weakness and dysarthria. Your MRI and CT of your head revealed improvement and decrease in size of your head bleed you sustained during your last hospital admission. Your intermittent aphasia and weakness may have been a result of a post-seizure state. You underwent monitoring of your brain during this hospital admission, which did not reveal any seizure changes during your hospital stay.     GENERAL INSTRUCTIONS  1) Diet: You may resume your regular home diet     2) Activity:   - Move around as you are able  You will work with Physical Therapy and Occupational Therapy at home to help regain your strength, balance, and endurance.     3) Medications:   - Take tylenol 650 mg every 6 hours as needed for your pain. You can alternate this every 3 hours with Ibuprofen (example: take tylenol at 6 am, noon, 6 pm and take ibuprofen at 9 am, 3 pm, 9 pm).   - You were started on Keppra (anti-seizure medication) while you were admitted in the hospital. You were started on a dose of 750 mg twice a day. You will continue to take this dosage twice a day upon discharge, until you follow up outpatient with the Epilepsy Clinic.  - You can continue resuming all your home medications, including your aspirin, atorvastatin, Vitamin D, synthroid, metoprolol tartrate, and paroxetine.   Please reduce your metoprolol dose to 25mg twice a day, instead of 50 mg twice a day.     4) Return precautions -- Call the on call numbers listed for Trauma surgery below if you have any of the following symptoms:  [phone]  - Fever > 100.5 F (>38 C), chills  -uncontrolled nausea and/or vomiting  -Chest pain  -new or worsening shortness of breathe  -worsening weakness or inability to speak  -have new bruising, rashes, blistering on your body  -new numbness/tingling, loss  of feeling in any part of your body  -new or worsening swelling  -uncontrolled pain    6) Wound care:  You have a superficial hematoma of your left scalp. This swelling and bruising will decrease over time.     ADDITIONAL INSTRUCTIONS    FOLLOW-UP APPOINTMENTS:  Trauma Surgery Clinic  You will follow-up with this clinic in 2 after discharge. If you do not hear from them within 1 week of discharge with appointment information, please call [phone] to schedule your appointment.    Neurology Clinic ([facility])  You will follow-up with this clinic in 2 weeks after discharge. If you do not hear from them within 1 week of discharge with appointment information, please call [phone] to schedule your appointment.    Neurosurgery Clinic  You will follow-up with this clinic in 2 weeks after discharge. If you do not hear from them within 1 week of discharge with appointment information, please call [phone]  to schedule your appointment.    - Primary Care Provider  Please follow up with your primary care provider after discharge to ensure continuity of care for your medical comorbidities.

## 2024-04-22 NOTE — HOSPITAL COURSE
Marybeth Duggan is a 77 y/o primarily Cro[place]-speaking female who was recently admitted to the trauma service from 4/7 - 4/10 after a fall backwards from standing off 1 step and striking the left side of her head/face on the floor resulting in a traumatic left subdural hematoma and a superficial hematoma of her left scalp. She was neurologically intact throughout the course of her admission. CT Head imaging was stable. Evaluated by neurosurgery, non-operatively managed, recommended Keppra 500 mg BID x 7 days, holding home ASA until post-bleed day 14 (4/21), and 2-week outpatient follow-up with repeat CT Head at that time. Patient was discharged home with home health care/family assistance on 4/10. On discharge, she was neurologically intact, and medically stable.   Patient represented to the hospital on 4/15 after incoherent speech and new onset weakness of her upper extremity. She had no new episodes of fall, and had completed her 7-day course of Keppra. She underwent repeat CT and MRI, which was interpreted as stable compared to her most recent imaging on 4/8.   Patient was evaluated by neurology who recommend patient with continuous video EEG, as well as loading and maintenance with Keppra. Patient was evaluated by physical therapy and occupational therapy, who recommended to have low intensity level of continued care. Geriatrics also evaluated patient for assistance with medical management of her comorbidities. Patient did not have any epileptiform discharges on her video EEG during her hospital stay. Re-presentation with weakness and aphasia was likely secondary to possible post-ictal aphasia. At time of discharge, patient was tolerating regular diet, ambulating appropriately, and voiding adequately. Pain was well-controlled. Patient will continue on keppra 750mg BID, and will follow up with Epilepsy outpatient. Patient will also follow up with Neurosurgery, Trauma Surgery clinic and her Primary Care Provider.

## 2024-04-22 NOTE — NURSING NOTE
Patient discharged home. Daughters at the bedside present for discharge instructions. Addressed questions, comments and concerns. Patient and family given prescriptions. IV removed. Belongings given to patient. Patient transportation is daughters.

## 2024-04-22 NOTE — PROGRESS NOTES
Transitional Care Coordinator Note: Per medical team (trauma) patient is medically ready. Discharge dispo: Plan for patient to discharge home with HC. TCC contacted [facility] intake nurse to confirm acceptance and SOC date, awaiting response. TCC contacted patient's daughter Cale Mcclain [phone] regarding transportation. Per LENAjonathan plan to assist patient with transportation, however expressed concerns about patient discharging home as patient has discharge home with HC from a previous admission and feels patient may need SNF vs AR placement. TCC contacted PT team as family requested to meet with therapy to discuss recs.     UPDATE 4/22/2024 12:37  Per [facility] referral processed with SOC 24-48hrs.       Jessica Cruz RN BSN   Transitional Care Coordinator       UPDATE 4/22/2024 14:02  Patient evaluated by PT rec remains low intensity. Patient's daughters at bedside during PT assessment and agreeable to discharge plan. [facility] updated on home address [id number] [place] [address]. Team to provide script for FWW, family agreeable to paper script. TCC educated patient's family on how to obtain walker from local [facility]. Patient's daughter expressed understanding.     Jessica Cruz RN BSN   Transitional Care Coordinator

## 2024-04-22 NOTE — HH CARE COORDINATION
Home Care received a Referral for Physical Therapy and Occupational Therapy. We have processed the referral for a Start of Care on 04/24.     If you have any questions or concerns, please feel free to contact us at [phone]. Follow the prompts, enter your five digit zip code, and you will be directed to your care team on [facility] 1.

## 2024-04-23 ENCOUNTER — PATIENT OUTREACH (OUTPATIENT)
Dept: PRIMARY CARE | Facility: CLINIC | Age: 79
End: 2024-04-23

## 2024-04-23 LAB — VIT B1 PYROPHOSHATE BLD-SCNC: 163 NMOL/L (ref 70–180)

## 2024-04-23 NOTE — PROGRESS NOTES
Discharge Facility:    [facility]   Discharge Diagnosis:    Fluctuating mental status   Admission Date:   4/17/2  Discharge Date:   4/22/24     PCP Appointment Date:   task to office for open appt   Specialist Appointment Date:    dtr to schedule with [facility] Encounter and Summary:   Linked   See discharge assessment below for further details        Issues Requiring Follow-Up  Trauma Surgery Clinic  Neurosurgery Clinic  Epilepsy Clinic  Primary Care Provider     Engagement  Call Start Time: 1214 (cm spoke with DTR) (4/23/2024 12:14 PM)    Medications  Medications reviewed with patient/caregiver?: Yes (4/23/2024 12:14 PM)  Is the patient having any side effects they believe may be caused by any medication additions or changes?: No (4/23/2024 12:14 PM)  Does the patient have all medications ordered at discharge?: Yes (4/23/2024 12:14 PM)  Care Management Interventions: No intervention needed (4/11/2024  9:35 AM)  Prescription Comments: new script fopr Keppra (4/23/2024 12:14 PM)  Is the patient taking all medications as directed (includes completed medication regime)?: Yes (4/23/2024 12:14 PM)  Care Management Interventions: Provided patient education (4/23/2024 12:14 PM)  Medication Comments: Pt denies problems obtaining or affording medication (4/23/2024 12:14 PM)    Appointments  Does the patient have a primary care provider?: Yes (4/23/2024 12:14 PM)  Care Management Interventions: Advised patient to make appointment (4/23/2024 12:14 PM)  Has the patient kept scheduled appointments due by today?: Yes (4/11/2024  9:35 AM)  Care Management Interventions: Advised to schedule with specialist (4/23/2024 12:14 PM)    Self Management  What is the home health agency?: [facility] (4/23/2024 12:14 PM)  Has home health visited the patient within 72 hours of discharge?: Call prior to 72 hours (4/23/2024 12:14 PM)  What Durable Medical Equipment (DME) was ordered?: n/a (4/23/2024 12:14  PM)    Patient Teaching  Does the patient have access to their discharge instructions?: Yes (4/23/2024 12:14 PM)  Care Management Interventions: Reviewed instructions with patient (4/23/2024 12:14 PM)  What is the patient's perception of their health status since discharge?: Improving (4/23/2024 12:14 PM)  Is the patient/caregiver able to teach back the hierarchy of who to call/visit for symptoms/problems? PCP, Specialist, Home Health nurse, Urgent Care, ED, 911: Yes (4/23/2024 12:14 PM)  Patient/Caregiver Education Comments: This CM spoke with pt's daughter via phone. Dtr reports pt doing well at home since discharge. New meds reviewed. Denies CP and SOB.  Dtr states pt is improving. CM provided dtr  my contact info for any additional concerns or questions. (4/23/2024 12:14 PM)    Wrap Up  Wrap Up Additional Comments: Pt was admitted to [facility] 4/7-4/10/2024 after a fall resulting in subdural hematoma. Spoke with pt daughter Cale who reports pt is doing well since discharge. Pt discharged with [facility]. Cale denies any questions or issues with medication and reports pt has all of her prescriptions. Cale reports understanding of discharge instructions. Cale states she is going to call to schedule neurosurgery follow up appt and ct scan; she declines PCP follow up for pt at this time. Cale denies any questions, needs, or concerns. She is encouraged to call if questions or needs arise. (4/11/2024  9:35 AM)  Call End Time: 0939 (4/11/2024  9:35 AM)

## 2024-04-25 ENCOUNTER — HOME CARE VISIT (OUTPATIENT)
Dept: HOME HEALTH SERVICES | Facility: HOME HEALTH | Age: 79
End: 2024-04-25
Payer: MEDICARE

## 2024-04-25 VITALS
SYSTOLIC BLOOD PRESSURE: 120 MMHG | RESPIRATION RATE: 16 BRPM | OXYGEN SATURATION: 96 % | HEART RATE: 58 BPM | DIASTOLIC BLOOD PRESSURE: 68 MMHG | TEMPERATURE: 98.2 F

## 2024-04-25 PROBLEM — R47.81 SLURRED SPEECH: Status: ACTIVE | Noted: 2024-04-25

## 2024-04-25 PROCEDURE — 0023 HH SOC

## 2024-04-25 PROCEDURE — G0151 HHCP-SERV OF PT,EA 15 MIN: HCPCS

## 2024-04-25 SDOH — HEALTH STABILITY: PHYSICAL HEALTH
EXERCISE COMMENTS: PT INSTRUCTED PATIENT (AND CAREGIVERS) IN SEATED EXERCISES IN ORDER TO IMPROVE LE STRENGTH AND FUNCTIONAL PERFORMANCE, 1X10:  - KNEE EXTENSION  - KNEE FLEXION  - HIP FLEXION  - HIP ABDUCTION/ADDUCTION  - PF/DF

## 2024-04-25 ASSESSMENT — ACTIVITIES OF DAILY LIVING (ADL)
AMBULATION_DISTANCE/DURATION_TOLERATED: 100 FT.
AMBULATION ASSISTANCE ON FLAT SURFACES: 1
OASIS_M1830: 03
ENTERING_EXITING_HOME: CONTACT GUARD ASSIST

## 2024-04-25 ASSESSMENT — ENCOUNTER SYMPTOMS
OCCASIONAL FEELINGS OF UNSTEADINESS: 1
PERSON REPORTING PAIN: PATIENT
PAIN: 1

## 2024-04-26 ENCOUNTER — HOSPITAL ENCOUNTER (OUTPATIENT)
Dept: RADIOLOGY | Facility: CLINIC | Age: 79
Discharge: HOME | End: 2024-04-26
Payer: MEDICARE

## 2024-04-26 ENCOUNTER — OFFICE VISIT (OUTPATIENT)
Dept: ORTHOPEDIC SURGERY | Facility: CLINIC | Age: 79
End: 2024-04-26
Payer: MEDICARE

## 2024-04-26 VITALS — WEIGHT: 214.73 LBS | BODY MASS INDEX: 36.86 KG/M2

## 2024-04-26 DIAGNOSIS — G89.29 BILATERAL CHRONIC KNEE PAIN: Primary | ICD-10-CM

## 2024-04-26 DIAGNOSIS — M25.562 BILATERAL CHRONIC KNEE PAIN: Primary | ICD-10-CM

## 2024-04-26 DIAGNOSIS — R52 PAIN: ICD-10-CM

## 2024-04-26 DIAGNOSIS — M25.561 BILATERAL CHRONIC KNEE PAIN: Primary | ICD-10-CM

## 2024-04-26 DIAGNOSIS — M17.0 BILATERAL PRIMARY OSTEOARTHRITIS OF KNEE: ICD-10-CM

## 2024-04-26 PROCEDURE — 2500000004 HC RX 250 GENERAL PHARMACY W/ HCPCS (ALT 636 FOR OP/ED): Performed by: ORTHOPAEDIC SURGERY

## 2024-04-26 PROCEDURE — 73560 X-RAY EXAM OF KNEE 1 OR 2: CPT | Mod: BILATERAL PROCEDURE | Performed by: ORTHOPAEDIC SURGERY

## 2024-04-26 PROCEDURE — 99213 OFFICE O/P EST LOW 20 MIN: CPT | Performed by: ORTHOPAEDIC SURGERY

## 2024-04-26 PROCEDURE — 2500000005 HC RX 250 GENERAL PHARMACY W/O HCPCS: Performed by: ORTHOPAEDIC SURGERY

## 2024-04-26 PROCEDURE — 73560 X-RAY EXAM OF KNEE 1 OR 2: CPT | Mod: 50

## 2024-04-26 PROCEDURE — 1160F RVW MEDS BY RX/DR IN RCRD: CPT | Performed by: ORTHOPAEDIC SURGERY

## 2024-04-26 PROCEDURE — 1111F DSCHRG MED/CURRENT MED MERGE: CPT | Performed by: ORTHOPAEDIC SURGERY

## 2024-04-26 PROCEDURE — 1159F MED LIST DOCD IN RCRD: CPT | Performed by: ORTHOPAEDIC SURGERY

## 2024-04-26 PROCEDURE — 1036F TOBACCO NON-USER: CPT | Performed by: ORTHOPAEDIC SURGERY

## 2024-04-26 PROCEDURE — 20610 DRAIN/INJ JOINT/BURSA W/O US: CPT | Performed by: ORTHOPAEDIC SURGERY

## 2024-04-26 RX ORDER — TRIAMCINOLONE ACETONIDE 40 MG/ML
40 INJECTION, SUSPENSION INTRA-ARTICULAR; INTRAMUSCULAR
Status: COMPLETED | OUTPATIENT
Start: 2024-04-26 | End: 2024-04-26

## 2024-04-26 RX ORDER — LIDOCAINE HYDROCHLORIDE 10 MG/ML
1 INJECTION INFILTRATION; PERINEURAL
Status: COMPLETED | OUTPATIENT
Start: 2024-04-26 | End: 2024-04-26

## 2024-04-26 RX ADMIN — TRIAMCINOLONE ACETONIDE 40 MG: 400 INJECTION, SUSPENSION INTRA-ARTICULAR; INTRAMUSCULAR at 13:34

## 2024-04-26 RX ADMIN — LIDOCAINE HYDROCHLORIDE 1 ML: 10 INJECTION, SOLUTION INFILTRATION; PERINEURAL at 13:34

## 2024-04-26 ASSESSMENT — ENCOUNTER SYMPTOMS
LOSS OF SENSATION IN FEET: 0
DEPRESSION: 0
OCCASIONAL FEELINGS OF UNSTEADINESS: 1

## 2024-04-26 ASSESSMENT — PAIN - FUNCTIONAL ASSESSMENT: PAIN_FUNCTIONAL_ASSESSMENT: 0-10

## 2024-04-26 ASSESSMENT — PATIENT HEALTH QUESTIONNAIRE - PHQ9
1. LITTLE INTEREST OR PLEASURE IN DOING THINGS: NOT AT ALL
SUM OF ALL RESPONSES TO PHQ9 QUESTIONS 1 AND 2: 0
2. FEELING DOWN, DEPRESSED OR HOPELESS: NOT AT ALL

## 2024-04-26 ASSESSMENT — PAIN DESCRIPTION - DESCRIPTORS: DESCRIPTORS: ACHING

## 2024-04-26 ASSESSMENT — LIFESTYLE VARIABLES: TOTAL SCORE: 0

## 2024-04-26 ASSESSMENT — PAIN SCALES - GENERAL: PAINLEVEL_OUTOF10: 5 - MODERATE PAIN

## 2024-04-26 NOTE — PROGRESS NOTES
Subjective      Chief Complaint   Patient presents with    Right Knee - Pain    Left Knee - Pain        No surgery found     HPI  This 78 year old patient presents today with  bilateral knee pain. The patient states that this bilateral knee pain has been present for years. The patient rates the knee pain as 8. The patient states that knee pain is worse with and aggravated by activity and bearing weight. The patient states the knee is giving way and locking.The patient has tried ibuprofen and tylenol with no relief. Patient requests a discussion of further treatment options on examination today.     CARDIOLOGY:   Negative for chest pain, shortness of breath.   RESPIRATORY:   Negative for chest pain, shortness of breath.   MUSCULOSKELETAL:   See HPI for details.   NEUROLOGY:   Negative for tingling, numbness, weakness.    Objective    There were no vitals filed for this visit.    Knee Exam  Constitutional: Appears stated age. No apparent distress  Labored Breathing: No  Psychiatric: Normal mood and effect.   Neurological: alert and oriented x3  Skin: intact  MUSCULOSKELETAL: Neck: No tenderness. No pain or limitation with range of motion. Back: No tenderness. Straight leg test negative bilaterally. bilateral knee: There is diffuse tenderness over the knee. diminished range of motion McMurrays is negative. Anterior drawer and lachmans are negative. There is not an effusion present. The knee is stable to valgus and varus stressing. The patient walks with a painful gait favoring the bilateral knee while walking.    Standing AP x-rays of both knees and lateral x-rays of the left and right knees done and read in office today show osteoarthritis of the right and left knees.     Patient ID: Marybeth Duggan is a 78 y.o. female.    L Inj/Asp: bilateral knee on 4/26/2024 1:34 PM  Indications: pain  Details: 22 G needle, medial approach  Medications (Right): 40 mg triamcinolone acetonide 40 mg/mL; 1 mL lidocaine 10 mg/mL (1  %)  Medications (Left): 40 mg triamcinolone acetonide 40 mg/mL; 1 mL lidocaine 10 mg/mL (1 %)  Outcome: tolerated well, no immediate complications  Procedure, treatment alternatives, risks and benefits explained, specific risks discussed. Immediately prior to procedure a time out was called to verify the correct patient, procedure, equipment, support staff and site/side marked as required. Patient was prepped and draped in the usual sterile fashion.         Marybeth was seen today for pain and pain.  Diagnoses and all orders for this visit:  Bilateral chronic knee pain (Primary)  Bilateral primary osteoarthritis of knee  Options are discussed with the patient in detail. The patient is instructed regarding activity modification, ice, physician directed at home gentle strengthening and ROM exercises, and the appropriate use of Tylenol as needed for pain with its potential adverse reactions and side effects. The patient understands. The patient states that despite all the treatment listed above that this left and right knee pain is debilitating and  requests a discussion of further options. Cortisone injection to the left and right knee is discussed in the office today. This is done in the office today. See procedures below. Return as needed, Please note that this report has been produced using speech recognition software.  It may contain errors related to grammar, punctuation or spelling.  Electronically signed, but not reviewed.       Sunday Goldsmith MD

## 2024-04-30 ENCOUNTER — HOME CARE VISIT (OUTPATIENT)
Dept: HOME HEALTH SERVICES | Facility: HOME HEALTH | Age: 79
End: 2024-04-30
Payer: MEDICARE

## 2024-04-30 ENCOUNTER — APPOINTMENT (OUTPATIENT)
Dept: RADIOLOGY | Facility: HOSPITAL | Age: 79
End: 2024-04-30
Payer: MEDICARE

## 2024-04-30 VITALS
HEART RATE: 57 BPM | DIASTOLIC BLOOD PRESSURE: 64 MMHG | SYSTOLIC BLOOD PRESSURE: 128 MMHG | TEMPERATURE: 97.3 F | OXYGEN SATURATION: 96 %

## 2024-04-30 PROCEDURE — G0151 HHCP-SERV OF PT,EA 15 MIN: HCPCS

## 2024-04-30 PROCEDURE — G0152 HHCP-SERV OF OT,EA 15 MIN: HCPCS

## 2024-04-30 RX ORDER — METOPROLOL TARTRATE 50 MG/1
50 TABLET ORAL 2 TIMES DAILY
Qty: 30 TABLET | Refills: 0 | Status: CANCELLED | OUTPATIENT
Start: 2024-04-30

## 2024-04-30 SDOH — HEALTH STABILITY: PHYSICAL HEALTH
EXERCISE COMMENTS: PT INSTRUCTED PATIENT IN STANDING THER EX 1X10 INCLUDING:  - HIP FLEXION  - HIP ABDUCTION  - HIP EXTENSION  - KNEE FLEXION  - SEATED KNEE EXTENSION  - ANKLE PF/DF    PATIENT HAD TO TAKE A SEATED REST BREAK AFTER PERFORMING TWO STANDING EXERCISES DUE

## 2024-04-30 SDOH — HEALTH STABILITY: PHYSICAL HEALTH
EXERCISE COMMENTS: TO FATIGUE.  PATIENT TOLERATED NEW EXERCISES WELL.  PT EDUCATED PATIENT/CAREGIVER ON IMPORTANCE OF PATIENT PERFORMING HEP DAILY TO MAXIMIZE STRENGTH GAINS.

## 2024-04-30 ASSESSMENT — ENCOUNTER SYMPTOMS
PERSON REPORTING PAIN: PATIENT
DENIES PAIN: 1
PERSON REPORTING PAIN: PATIENT
DENIES PAIN: 1

## 2024-05-02 ENCOUNTER — APPOINTMENT (OUTPATIENT)
Dept: PRIMARY CARE | Facility: CLINIC | Age: 79
End: 2024-05-02
Payer: MEDICARE

## 2024-05-02 ENCOUNTER — HOME CARE VISIT (OUTPATIENT)
Dept: HOME HEALTH SERVICES | Facility: HOME HEALTH | Age: 79
End: 2024-05-02
Payer: MEDICARE

## 2024-05-02 PROCEDURE — G0180 MD CERTIFICATION HHA PATIENT: HCPCS | Performed by: SURGERY

## 2024-05-06 ENCOUNTER — APPOINTMENT (OUTPATIENT)
Dept: NEUROSURGERY | Facility: CLINIC | Age: 79
End: 2024-05-06
Payer: MEDICARE

## 2024-05-06 ENCOUNTER — OFFICE VISIT (OUTPATIENT)
Dept: PRIMARY CARE | Facility: CLINIC | Age: 79
End: 2024-05-06
Payer: MEDICARE

## 2024-05-06 VITALS
HEIGHT: 64 IN | DIASTOLIC BLOOD PRESSURE: 74 MMHG | RESPIRATION RATE: 18 BRPM | BODY MASS INDEX: 35.24 KG/M2 | SYSTOLIC BLOOD PRESSURE: 134 MMHG | HEART RATE: 78 BPM | WEIGHT: 206.4 LBS

## 2024-05-06 DIAGNOSIS — I10 ESSENTIAL HYPERTENSION: ICD-10-CM

## 2024-05-06 DIAGNOSIS — S06.5XAA TRAUMATIC INTRACRANIAL SUBDURAL HEMORRHAGE (MULTI): ICD-10-CM

## 2024-05-06 DIAGNOSIS — Z09 HOSPITAL DISCHARGE FOLLOW-UP: Primary | ICD-10-CM

## 2024-05-06 PROCEDURE — 1159F MED LIST DOCD IN RCRD: CPT | Performed by: INTERNAL MEDICINE

## 2024-05-06 PROCEDURE — 1160F RVW MEDS BY RX/DR IN RCRD: CPT | Performed by: INTERNAL MEDICINE

## 2024-05-06 PROCEDURE — 99214 OFFICE O/P EST MOD 30 MIN: CPT | Performed by: INTERNAL MEDICINE

## 2024-05-06 PROCEDURE — 1111F DSCHRG MED/CURRENT MED MERGE: CPT | Performed by: INTERNAL MEDICINE

## 2024-05-06 PROCEDURE — 3078F DIAST BP <80 MM HG: CPT | Performed by: INTERNAL MEDICINE

## 2024-05-06 PROCEDURE — 1036F TOBACCO NON-USER: CPT | Performed by: INTERNAL MEDICINE

## 2024-05-06 PROCEDURE — 99495 TRANSJ CARE MGMT MOD F2F 14D: CPT | Performed by: INTERNAL MEDICINE

## 2024-05-06 PROCEDURE — 3075F SYST BP GE 130 - 139MM HG: CPT | Performed by: INTERNAL MEDICINE

## 2024-05-06 ASSESSMENT — ENCOUNTER SYMPTOMS
DEPRESSION: 0
LOSS OF SENSATION IN FEET: 0
OCCASIONAL FEELINGS OF UNSTEADINESS: 0

## 2024-05-06 NOTE — ASSESSMENT & PLAN NOTE
EEG revealed no signs of seizure  Brain imaging revealed no extension of hematoma or acute cerebrovascular event  We discussed possible symptoms likely due to postconcussive syndrome in light of head trauma and this may take weeks to resolve.  Keep follow-up with neurology  Continue Keppra at current dose

## 2024-05-06 NOTE — PROGRESS NOTES
"Subjective   Patient ID: Marybeth Duggan is a 78 y.o. female who presents for Follow-up (Hospital discharge/).    HPI   She presents for follow-up after fall sustaining traumatic subdural hematoma followed by single seizure-like episode.  Today patient complains of intermittent dizziness and blurred vision.  Speech is back to normal per her daughter.  She denies headache, extremity weakness.    Review of Systems  12 point ROS completed, negative except for mentioned in HPI      Objective   /74   Pulse 78   Resp 18   Ht 1.626 m (5' 4\")   Wt 93.6 kg (206 lb 6.4 oz)   BMI 35.43 kg/m²     Physical Exam  Vitals reviewed.   Constitutional:       Appearance: Normal appearance. She is normal weight.   HENT:      Right Ear: Tympanic membrane and external ear normal.      Left Ear: Tympanic membrane and external ear normal.   Eyes:      Extraocular Movements: Extraocular movements intact.      Conjunctiva/sclera: Conjunctivae normal.      Pupils: Pupils are equal, round, and reactive to light.   Cardiovascular:      Rate and Rhythm: Normal rate and regular rhythm.      Pulses: Normal pulses.   Pulmonary:      Effort: Pulmonary effort is normal.      Breath sounds: Normal breath sounds.   Abdominal:      General: Bowel sounds are normal.      Palpations: Abdomen is soft.   Musculoskeletal:         General: Normal range of motion.      Cervical back: Normal range of motion.   Skin:     General: Skin is warm and dry.   Neurological:      General: No focal deficit present.      Mental Status: She is oriented to person, place, and time.   Psychiatric:         Mood and Affect: Mood normal.         Behavior: Behavior normal.           Assessment/Plan   Problem List Items Addressed This Visit             ICD-10-CM    Essential hypertension I10     BP elevated in office today.  Patient checks at home usually within normal range however she was rushing to today's visit this is because her to be anxious.  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  Keep follow-up with neurology  Continue Keppra at current dose         Hospital discharge follow-up - Primary Z09     I have reviewed the associated notes and investigations for the patients recent hospitalization. I have also reviewed and updated the patients medication list as indicated.          Keep follow-up

## 2024-05-06 NOTE — ASSESSMENT & PLAN NOTE
BP elevated in office today.  Patient checks at home usually within normal range however she was rushing to today's visit this is because her to be anxious.  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

## 2024-05-07 ASSESSMENT — ACTIVITIES OF DAILY LIVING (ADL)
HOME_HEALTH_OASIS: 01
OASIS_M1830: 02

## 2024-05-08 ENCOUNTER — PATIENT OUTREACH (OUTPATIENT)
Dept: PRIMARY CARE | Facility: CLINIC | Age: 79
End: 2024-05-08
Payer: MEDICARE

## 2024-05-08 NOTE — PROGRESS NOTES
Call regarding appt. with PCP on (5/6/24 after hospitalization. Cm spoke with dtr.   At time of outreach call the patients dtr feels as if the pts  condition has (improved/since last visit.  Hhc is finished  dtr and pt continue to do exercises to increase strength. No questions or concerns at this time.

## 2024-05-14 ENCOUNTER — APPOINTMENT (OUTPATIENT)
Dept: PRIMARY CARE | Facility: CLINIC | Age: 79
End: 2024-05-14
Payer: MEDICARE

## 2024-05-14 DIAGNOSIS — F41.9 ANXIETY AND DEPRESSION: ICD-10-CM

## 2024-05-14 DIAGNOSIS — F32.A ANXIETY AND DEPRESSION: ICD-10-CM

## 2024-05-14 RX ORDER — PAROXETINE HYDROCHLORIDE 20 MG/1
20 TABLET, FILM COATED ORAL DAILY
Qty: 90 TABLET | Refills: 1 | Status: SHIPPED | OUTPATIENT
Start: 2024-05-14

## 2024-05-22 ENCOUNTER — HOSPITAL ENCOUNTER (OUTPATIENT)
Dept: RADIOLOGY | Facility: HOSPITAL | Age: 79
Discharge: HOME | End: 2024-05-22
Payer: MEDICARE

## 2024-05-22 DIAGNOSIS — S06.5XAA SUBDURAL HEMATOMA (MULTI): ICD-10-CM

## 2024-05-22 PROCEDURE — 70450 CT HEAD/BRAIN W/O DYE: CPT | Performed by: RADIOLOGY

## 2024-05-22 PROCEDURE — 70450 CT HEAD/BRAIN W/O DYE: CPT

## 2024-05-29 ENCOUNTER — OFFICE VISIT (OUTPATIENT)
Dept: NEUROLOGY | Facility: CLINIC | Age: 79
End: 2024-05-29
Payer: MEDICARE

## 2024-05-29 VITALS
HEIGHT: 64 IN | BODY MASS INDEX: 35.34 KG/M2 | WEIGHT: 207 LBS | HEART RATE: 54 BPM | SYSTOLIC BLOOD PRESSURE: 132 MMHG | DIASTOLIC BLOOD PRESSURE: 72 MMHG

## 2024-05-29 DIAGNOSIS — R56.1: Primary | ICD-10-CM

## 2024-05-29 DIAGNOSIS — S06.5XAD: ICD-10-CM

## 2024-05-29 DIAGNOSIS — R41.82 FLUCTUATING MENTAL STATUS: ICD-10-CM

## 2024-05-29 PROCEDURE — 1159F MED LIST DOCD IN RCRD: CPT | Performed by: PSYCHIATRY & NEUROLOGY

## 2024-05-29 PROCEDURE — 1036F TOBACCO NON-USER: CPT | Performed by: PSYCHIATRY & NEUROLOGY

## 2024-05-29 PROCEDURE — 3078F DIAST BP <80 MM HG: CPT | Performed by: PSYCHIATRY & NEUROLOGY

## 2024-05-29 PROCEDURE — 1160F RVW MEDS BY RX/DR IN RCRD: CPT | Performed by: PSYCHIATRY & NEUROLOGY

## 2024-05-29 PROCEDURE — 3075F SYST BP GE 130 - 139MM HG: CPT | Performed by: PSYCHIATRY & NEUROLOGY

## 2024-05-29 PROCEDURE — 99215 OFFICE O/P EST HI 40 MIN: CPT | Performed by: PSYCHIATRY & NEUROLOGY

## 2024-05-29 RX ORDER — BISMUTH SUBSALICYLATE 262 MG
1 TABLET,CHEWABLE ORAL DAILY
COMMUNITY

## 2024-05-29 ASSESSMENT — ENCOUNTER SYMPTOMS
FATIGUE: 0
SPEECH DIFFICULTY: 0
SHORTNESS OF BREATH: 0
FEVER: 0
SINUS PAIN: 0
WOUND: 0
PALPITATIONS: 0
ARTHRALGIAS: 0
RHINORRHEA: 0
DIZZINESS: 0
DYSPHORIC MOOD: 0
MYALGIAS: 0
SINUS PRESSURE: 0
NERVOUS/ANXIOUS: 0
SEIZURES: 0
CHILLS: 0
LIGHT-HEADEDNESS: 0
WHEEZING: 0
COUGH: 0

## 2024-05-29 NOTE — PATIENT INSTRUCTIONS
You were seen today because you had a couple of episodes of seizure-like activity after a head injury.      We do not see a need to restart medication today.  However, if you have additional seizures, we should restart the medication.  If she has seizures, let our office know and we will send a prescription to restart the Keppra.      We will arrange a follow up visit with an  available for cognitive testing.

## 2024-05-29 NOTE — PROGRESS NOTES
"Subjective   Patient ID: Marybeth Duggan is a 78 y.o. female with past medical history of fall and subsequent subdural hematoma who presents for Fluctuating Mental Status ([facility] on 4/17/2024).    Here today to follow up after fall with subdural hematoma.  Patient has limited English proficiency, accompanied by her daughter who assists with language.  Had some episodes of altered mental status after head injury which were consistent with likely seizure activity.     Today, reports, she is mostly feeling better.  Previously, had numbness and weakness of right hand, facial droop, and had difficulty speaking, but these have all resolved.  Not having any further headaches.  Vision is still a little bit blurry, but is slowly improving over time.  Stayed on Keppra for one month, but ran out about a week ago and had stopped it.      Has not had any breakthrough seizures since stopping medication.      Daughter also interested in MMSE or MoCA for patient.          Review of Systems   Constitutional:  Negative for chills, fatigue and fever.   HENT:  Negative for congestion, rhinorrhea, sinus pressure and sinus pain.    Respiratory:  Negative for cough, shortness of breath and wheezing.    Cardiovascular:  Negative for chest pain, palpitations and leg swelling.   Musculoskeletal:  Negative for arthralgias and myalgias.   Skin:  Negative for pallor, rash and wound.   Neurological:  Negative for dizziness, seizures, speech difficulty and light-headedness.   Psychiatric/Behavioral:  Negative for dysphoric mood. The patient is not nervous/anxious.        Objective     Visit Vitals  /72 (BP Location: Left arm, Patient Position: Sitting, BP Cuff Size: Large adult)   Pulse 54   Ht 1.626 m (5' 4\")   Wt 93.9 kg (207 lb)   BMI 35.53 kg/m²   OB Status Menopausal   Smoking Status Never   BSA 2.06 m²         Physical Exam  Constitutional:       Appearance: Normal appearance.   HENT:      Head: Normocephalic and atraumatic.      Right " Ear: External ear normal.      Left Ear: External ear normal.      Nose: Nose normal.      Mouth/Throat:      Mouth: Mucous membranes are moist.      Pharynx: Oropharynx is clear.   Cardiovascular:      Pulses: Normal pulses.   Pulmonary:      Effort: Pulmonary effort is normal.   Skin:     General: Skin is warm and dry.   Neurological:      Mental Status: She is alert.      Cranial Nerves: Cranial nerves 2-12 are intact.      Motor: Motor strength is normal.     Gait: Gait is intact.   Psychiatric:         Mood and Affect: Mood normal.         Speech: Speech normal.         Behavior: Behavior normal.     Neurologic Exam     Mental Status   Speech: speech is normal     Cranial Nerves   Cranial nerves II through XII intact.     Motor Exam   Muscle bulk: normal  Overall muscle tone: normal    Strength   Strength 5/5 throughout.     Sensory Exam   Light touch normal.     Gait, Coordination, and Reflexes     Gait  Gait: normal       Assessment/Plan   Problem List Items Addressed This Visit    None  Visit Diagnoses       Post traumatic seizure (Multi)    -  Primary    Fluctuating mental status        Post-traumatic subdural hematoma, with unknown loss of consciousness status, subsequent encounter            Will not restart Keppra at this time.  Patient to avoid driving, ladders, heavy machinery.  If she has another seizure, we will resume this medication long-term.      Will need to arrange a follow up appointment and have a  or MARRTI available for cognitive evaluation.     Patient seen and discussed with attending physician, Dr Gloria Crowley, DO PGY3  Family Medicine

## 2024-05-31 ENCOUNTER — OFFICE VISIT (OUTPATIENT)
Dept: UROLOGY | Facility: CLINIC | Age: 79
End: 2024-05-31
Payer: MEDICARE

## 2024-05-31 VITALS — DIASTOLIC BLOOD PRESSURE: 86 MMHG | HEART RATE: 58 BPM | SYSTOLIC BLOOD PRESSURE: 153 MMHG

## 2024-05-31 DIAGNOSIS — N39.41 URGE URINARY INCONTINENCE: Primary | ICD-10-CM

## 2024-05-31 DIAGNOSIS — R39.9 UTI SYMPTOMS: ICD-10-CM

## 2024-05-31 DIAGNOSIS — N81.89 PELVIC FLOOR WEAKNESS: ICD-10-CM

## 2024-05-31 DIAGNOSIS — L90.0 LICHEN SCLEROSUS ET ATROPHICUS: ICD-10-CM

## 2024-05-31 DIAGNOSIS — N95.2 VAGINAL ATROPHY: ICD-10-CM

## 2024-05-31 LAB
POC APPEARANCE, URINE: ABNORMAL
POC BILIRUBIN, URINE: NEGATIVE
POC BLOOD, URINE: ABNORMAL
POC COLOR, URINE: YELLOW
POC GLUCOSE, URINE: NEGATIVE MG/DL
POC KETONES, URINE: ABNORMAL MG/DL
POC LEUKOCYTES, URINE: ABNORMAL
POC NITRITE,URINE: NEGATIVE
POC PH, URINE: 5.5 PH
POC PROTEIN, URINE: NEGATIVE MG/DL
POC SPECIFIC GRAVITY, URINE: >=1.03
POC UROBILINOGEN, URINE: 0.2 EU/DL

## 2024-05-31 PROCEDURE — 1036F TOBACCO NON-USER: CPT | Performed by: OBSTETRICS & GYNECOLOGY

## 2024-05-31 PROCEDURE — 99213 OFFICE O/P EST LOW 20 MIN: CPT | Performed by: OBSTETRICS & GYNECOLOGY

## 2024-05-31 PROCEDURE — 87086 URINE CULTURE/COLONY COUNT: CPT | Performed by: OBSTETRICS & GYNECOLOGY

## 2024-05-31 PROCEDURE — 81003 URINALYSIS AUTO W/O SCOPE: CPT | Mod: 91 | Performed by: OBSTETRICS & GYNECOLOGY

## 2024-05-31 PROCEDURE — 1160F RVW MEDS BY RX/DR IN RCRD: CPT | Performed by: OBSTETRICS & GYNECOLOGY

## 2024-05-31 PROCEDURE — 1159F MED LIST DOCD IN RCRD: CPT | Performed by: OBSTETRICS & GYNECOLOGY

## 2024-05-31 PROCEDURE — 3077F SYST BP >= 140 MM HG: CPT | Performed by: OBSTETRICS & GYNECOLOGY

## 2024-05-31 PROCEDURE — 3079F DIAST BP 80-89 MM HG: CPT | Performed by: OBSTETRICS & GYNECOLOGY

## 2024-05-31 RX ORDER — CIPROFLOXACIN 250 MG/1
250 TABLET, FILM COATED ORAL 2 TIMES DAILY
Qty: 14 TABLET | Refills: 0 | Status: SHIPPED | OUTPATIENT
Start: 2024-05-31 | End: 2024-06-07

## 2024-05-31 NOTE — PATIENT INSTRUCTIONS
Please start your ciprofloxacin therapy now.  You will be contacted with the results of urine culture should you require alternate therapy.    We discussed PTNS and the Revi system.  You have been provided information on both.  Please contact the clinic should you wish to proceed in this direction.    [phone]

## 2024-05-31 NOTE — PROGRESS NOTES
Subjective   Patient ID: Marybeth Duggan is a 78 y.o. female who presents for No chief complaint on file..  HPI  78-year-old with concerns for vaginal atrophy, lichen sclerosis, history of yeast vaginitis, pelvic floor weakness, and urge urinary incontinence having undergone 100 units of Botox 09/07/23    Unfortunately when the patient arrived for her urodynamic testing 1/4/2024 it appears that the machine was broken.  She was not rescheduled. She did not have any benefits with her Botox therapy. She is noting worsening urgency and frequency complaints. She notes 2-3 episodes of nocturia with  enuresis. She voids every hour during the day. -She is feeling some burning with urination recently.    She recently had a fall due to dehydration. She is consuming 24 oz of fluid a day.    She denies any vaginal complaints, no abnormal vaginal bleeding or discharge. She is utilizing the vaginal estrogen cream.      She denies any bowel related complaints, no fecal or flatal incontinence.     She has no other complaints.     From Previous note  77-year-old with concerns for vaginal atrophy, lichen sclerosis, history of yeast vaginitis, pelvic floor weakness, and urge urinary incontinence having undergone 100 units of Botox 09/07/23     Unfortunately the patient is not having any benefits with her intradetrusor Botox, she notes 3-4 episodes of nocturia but denies any enuresis. She voids every hour during the day. PVR is O and Urinalysis is negative.     She denies any vaginal complaints, no abnormal vaginal bleeding or discharge. She is utilizing the vaginal estrogen cream.      She denies any bowel related complaints, no fecal or flatal incontinence.     She has no other complaints.     From previous note  77-year-old with concerns for vaginal atrophy, lichen sclerosis, history of yeast vaginitis, pelvic floor weakness, and urge urinary incontinence having undergone 100 units of Botox today 09/07/23     She has no other  complaints.      From previous note  77-year-old with concerns for vaginal atrophy, lichen sclerosis, history of yeast vaginitis, pelvic floor weakness, and urge urinary incontinence.     She is utilizing Gemtesa but has not noticed improvement. She continues to note urgency and frequency. She urinates every 2 hours during the day and 3 or 4 times at night. She changes pads twice during day and night. She has leakage. She denies any UTI like symptoms.     She denies any vaginal complaints, no abnormal vaginal bleeding or discharge. She is utilizing the vaginal estrogen cream.      She denies any bowel related complaints, no fecal or flatal incontinence.     She has no other complaints.     From previous note   77-year-old with concerns for vaginal atrophy, lichen sclerosis, yeast vaginitis, pelvic floor weakness, and urge urinary incontinence.     The patient speaks Ivorian and presents with her daughter as her . She had been utilizing the Oxybutynin therapy with benefits, however she notes dry mouth complaint with it. She continues to note urinary urgency and frequency. She denies any UTI like symptoms.      She denies any vaginal complaints, no abnormal vaginal bleeding or discharge. She is utilizing the vaginal estrogen cream.      She denies any bowel related complaints, no fecal or flatal incontinence.     She has no other complaints.        From previous note   77- year-old patient presenting as a referral from  with complaints of urgency, frequency and urge related incontinence.     The patient speaks Ivorian and presents with her daughter as her . The patient complaints of urinary urgency and frequency for the past year. She notes 3-4 episodes of nocturia with enuresis and utilizes diapers which is wet in the morning. She voids every 1-2 hours during the day with incontinence between voids. She utilizes pads to avoid accidents and she changes is 2-3 times during the day. She does  note rare leaking on laughing, cough or sneezing. She denies any history of nephrolithiasis, gross hematuria or chronic recurrent UTIs.      She is not sexually active but denies any vaginal complaints, no abnormal vaginal bleeding or discharge. She does note some vaginal dryness and irritation. She denies any bulge complaints, no pressure or pulling.     She denies any bowel related complaints, no fecal or flatal incontinence.     She has no other complaints    Review of Systems  Constitutional: No fever, No chills and No fatigue.   Eyes: No vision problems and No dryness of the eyes.   ENT: No dry mouth, No hearing loss and No nosebleeds.   Cardiovascular: No chest pain, No palpitations and No orthopnea.   Respiratory: No shortness of breath, No cough and No wheezing.   Gastrointestinal: No abdominal pain, No constipation, No nausea, No diarrhea, No vomiting and No melena.   Genitourinary: As noted in HPI.   Musculoskeletal: No back pain, No myalgias, No muscle weakness, No joint swelling and No leg edema.   Integumentary: No rashes, No skin lesion and No itching.   Neurological: No headache, No numbness and No dizziness.   Psychiatric: No sleep disturbances, No anxiety and No depression.   Endocrine: No hot flashes, No loss of hair and No hirsutism.   Hematologic/Lymphatic: No swollen glands, No tendency for easy bleeding and No tendency for easy bruising.   All other systems have been reviewed and are negative for complaint.        Objective   Physical Exam  PHYSICAL EXAMINATION:  No LMP recorded. (Menstrual status: Menopausal).  There is no height or weight on file to calculate BMI.  /86 (BP Location: Right arm)   Pulse 58   General Appearance: well appearing  Neuro: Alert and oriented   HEENT: mucous membranes moist, neck supple  Resp: No respiratory distress, normal work of breathing  MSK: normal range of motion, gait appropriate    Assessment/Plan     78-year-old with concerns for vaginal atrophy,  "lichen sclerosis, history of yeast vaginitis, pelvic floor weakness, and urge urinary incontinence having undergone 100 units of Botox 09/07/23 with persistent urge urinary incontinence complaints     1. We have previously discussed the patient's vaginal complaints. We discussed the safety, efficacy, proper utilization of vaginal estrogen therapy.  We discussed restarting her vaginal estrogen therapy. We also discussed my concerns for lichen sclerosis.  She is no longer taking her clobetasol therapy and we we will readdress restarting this at follow-up visits.     2. We discussed her pelvic floor weakness. She may benefit from pelvic floor physical therapy. She is not interested in this at this time.     3. Unfortunately the patient had no significant benefits in her lower urinary tract symptoms following her Botox 100 units 9/7/2023.. We have previously discussed the importance of fluid management strategies and timed voiding. The patient noted some improvements with oxybutynin but noted intolerable dry mouth.  She had no significant benefits with Gemtesa.  Unfortunate the patient was unable to proceed with urodynamics as the machine was broken.  We discussed her lower urinary tract symptoms at length today and her alternate therapies.  She does not want any \"surgery\".  We discussed the possibility of PTNS as well as the Revi device.  She is not interested in the InterStim device.  She was provided information on these options and will consider this moving forward.     4. The patient is noting worsening burning and lower urinary tract concerns for UTI.  Urinalysis is equivocal.  She will be empirically started on ciprofloxacin now.  Pending urine culture.    5.  The patient will contact the clinic should she wish to proceed with PTNS or Revi.  She will be contacted with the results of her urine culture should she require alternate therapy.     GAURI Venegas MD     Scribe Attestation  By signing my name " below, I, Caroline Castillo attest that this documentation has been prepared under the direction and in the presence of Jefferson Venegas MD. All medical record entries made by the Scribe were at my direction or personally dictated by me. I have reviewed the chart and agree that the record accurately reflects my personal performance of the history, physical exam, discussion and plan.

## 2024-06-03 LAB — BACTERIA UR CULT: ABNORMAL

## 2024-06-11 ENCOUNTER — PATIENT OUTREACH (OUTPATIENT)
Dept: PRIMARY CARE | Facility: CLINIC | Age: 79
End: 2024-06-11
Payer: MEDICARE

## 2024-06-13 ENCOUNTER — APPOINTMENT (OUTPATIENT)
Dept: PRIMARY CARE | Facility: CLINIC | Age: 79
End: 2024-06-13
Payer: MEDICARE

## 2024-06-18 DIAGNOSIS — E03.9 HYPOTHYROIDISM, UNSPECIFIED TYPE: ICD-10-CM

## 2024-06-18 RX ORDER — LEVOTHYROXINE SODIUM 88 UG/1
88 TABLET ORAL
Qty: 90 TABLET | Refills: 0 | Status: SHIPPED | OUTPATIENT
Start: 2024-06-18 | End: 2024-09-16

## 2024-07-12 ENCOUNTER — PATIENT OUTREACH (OUTPATIENT)
Dept: PRIMARY CARE | Facility: CLINIC | Age: 79
End: 2024-07-12
Payer: MEDICARE

## 2024-07-25 ENCOUNTER — APPOINTMENT (OUTPATIENT)
Dept: NEUROLOGY | Facility: CLINIC | Age: 79
End: 2024-07-25
Payer: MEDICARE

## 2024-07-25 DIAGNOSIS — G31.84 MILD COGNITIVE IMPAIRMENT: Primary | ICD-10-CM

## 2024-07-25 PROCEDURE — 99215 OFFICE O/P EST HI 40 MIN: CPT | Performed by: PSYCHIATRY & NEUROLOGY

## 2024-07-25 ASSESSMENT — MINI MENTAL STATE EXAM
SUM ALL MMSE QUESTIONS FOR TOTAL SCORE [OUT OF 30].: 20
SAY:  READ THE WORDS ON THE PAGE AND THEN DO WHAT IT SAYS.  THEN HAND THE PERSON THE SHEET WITH CLOSE YOUR EYES ON IT.  IF THE SUBJECT READS AND DOES NOT CLOSE THEIR EYES, REPEAT UP TO THREE TIMES.  SCORE ONLY IF SUBJECT CLOSES EYES.: 3 CORRECT
SAY: I WOULD LIKE YOU TO REPEAT THIS PHRASE AFTER ME: NO IFS, ANDS, OR BUTS.: 1 CORRECT
NAME OR REPEAT 3 OBJECTS - (APPLE, TABLE, PENNY) OR (BALL, TREE, FLAG): 3 CORRECT
PLEASE COPY THIS PICTURE (NOTE ALL 10 ANGLES MUST BE PRESENT AND TWO MUST INTERSECT): 0 CORRECT
WHAT IS THE YEAR, SEASON, DATE, DAY, AND MONTH: 4 CORRECT
PLACE DESIGN, ERASER AND PENCIL IN FRONT OF THE PERSON.  SAY:  COPY THIS DESIGN PLEASE.  SHOW: DESIGN. ALLOW: MULTIPLE TRIES. WAIT UNTIL PERSON IS FINISHED AND HANDS IT BACK. SCORE: ONLY FOR DIAGRAM WITH 4-SIDED FIGURE BETWEEN TWO 5-SIDED FIGURES: 1 CORRECT
WHAT STATE, COUNTRY, CITY, HOSPITAL, FLOOR: 2 CORRECT
RECALL THE 3 OBJECTS FROM ABOVE (APPLE, TABLE, PENNY) OR (BALL, TREE, FLAG): 2 CORRECT
HAND THE PERSON A PENCIL AND PAPER. SAY:  WRITE ANY COMPLETE SENTENCE ON THAT PIECE OF PAPER. (NOTE: THE SENTENCE MUST MAKE SENSE.  IGNORE SPELLING ERRORS): 1 CORRECT
SHOW: PENCIL [OBJECT] ASK: WHAT IS THIS CALLED?: 2 CORRECT
SPELL THE WORD WORLD FORWARD AND BACKWARDS OR SERIAL 7S: 1 CORRECT

## 2024-07-25 NOTE — PROGRESS NOTES
Subjective   Marybeth Duggan is a 78 y.o.   female.  HPI  This is a 78 year old woman here for a follow up appointment to evaluate her mental status- her daughter Cale is with her today.  Her daughter is concerned about her memory.  She lives independently, takes care of herself.  She has an 8th grade education, previously worked in a factory, now retired.  FH: no dementia in family.  Imaging done in 4/24: MRI of the brain without contrast: left SDH  Lab work: since 4/24: normal vitamin B12 and TSH.    She has had progressive short term memory impairment over 2-3 years.  Objective   Neurological Exam  Alert and pleasant older woman.  Normal Turkish speaking woman.  Physical Exam  I personally reviewed laboratory, radiographic, and medical studies which were pertinent for nothing.    Assessment/Plan

## 2024-07-25 NOTE — PATIENT INSTRUCTIONS
You appear to have mild cognitive impairment, which may be slowly progressive.    Please keep active, follow up in one year,  and reassess her condition.  Thank you for coming into my office today.

## 2024-08-12 ENCOUNTER — APPOINTMENT (OUTPATIENT)
Dept: CARDIOLOGY | Facility: HOSPITAL | Age: 79
End: 2024-08-12
Payer: MEDICARE

## 2024-08-12 ENCOUNTER — APPOINTMENT (OUTPATIENT)
Dept: RADIOLOGY | Facility: HOSPITAL | Age: 79
End: 2024-08-12
Payer: MEDICARE

## 2024-08-12 ENCOUNTER — HOSPITAL ENCOUNTER (OUTPATIENT)
Facility: HOSPITAL | Age: 79
Setting detail: OBSERVATION
Discharge: HOME | End: 2024-08-14
Attending: EMERGENCY MEDICINE | Admitting: INTERNAL MEDICINE
Payer: MEDICARE

## 2024-08-12 DIAGNOSIS — R60.0 LEG EDEMA: Primary | ICD-10-CM

## 2024-08-12 DIAGNOSIS — I10 HYPERTENSION, UNSPECIFIED TYPE: ICD-10-CM

## 2024-08-12 DIAGNOSIS — R60.0 LOWER EXTREMITY EDEMA: ICD-10-CM

## 2024-08-12 DIAGNOSIS — I50.31 ACUTE HEART FAILURE WITH PRESERVED EJECTION FRACTION (MULTI): ICD-10-CM

## 2024-08-12 DIAGNOSIS — E87.70 HYPERVOLEMIA, UNSPECIFIED HYPERVOLEMIA TYPE: ICD-10-CM

## 2024-08-12 LAB
ALBUMIN SERPL BCP-MCNC: 3.8 G/DL (ref 3.4–5)
ALP SERPL-CCNC: 106 U/L (ref 33–136)
ALT SERPL W P-5'-P-CCNC: 15 U/L (ref 7–45)
ANION GAP SERPL CALC-SCNC: 14 MMOL/L (ref 10–20)
AST SERPL W P-5'-P-CCNC: 17 U/L (ref 9–39)
BASOPHILS # BLD AUTO: 0.04 X10*3/UL (ref 0–0.1)
BASOPHILS NFR BLD AUTO: 0.5 %
BILIRUB SERPL-MCNC: 0.6 MG/DL (ref 0–1.2)
BNP SERPL-MCNC: 120 PG/ML (ref 0–99)
BUN SERPL-MCNC: 19 MG/DL (ref 6–23)
CALCIUM SERPL-MCNC: 9 MG/DL (ref 8.6–10.3)
CARDIAC TROPONIN I PNL SERPL HS: 7 NG/L (ref 0–13)
CARDIAC TROPONIN I PNL SERPL HS: 9 NG/L (ref 0–13)
CHLORIDE SERPL-SCNC: 100 MMOL/L (ref 98–107)
CO2 SERPL-SCNC: 28 MMOL/L (ref 21–32)
CREAT SERPL-MCNC: 0.72 MG/DL (ref 0.5–1.05)
EGFRCR SERPLBLD CKD-EPI 2021: 86 ML/MIN/1.73M*2
EOSINOPHIL # BLD AUTO: 0.15 X10*3/UL (ref 0–0.4)
EOSINOPHIL NFR BLD AUTO: 1.9 %
ERYTHROCYTE [DISTWIDTH] IN BLOOD BY AUTOMATED COUNT: 12.1 % (ref 11.5–14.5)
GLUCOSE SERPL-MCNC: 198 MG/DL (ref 74–99)
HCT VFR BLD AUTO: 41.6 % (ref 36–46)
HGB BLD-MCNC: 13.7 G/DL (ref 12–16)
IMM GRANULOCYTES # BLD AUTO: 0.06 X10*3/UL (ref 0–0.5)
IMM GRANULOCYTES NFR BLD AUTO: 0.8 % (ref 0–0.9)
LACTATE SERPL-SCNC: 1.5 MMOL/L (ref 0.4–2)
LYMPHOCYTES # BLD AUTO: 2.3 X10*3/UL (ref 0.8–3)
LYMPHOCYTES NFR BLD AUTO: 29.2 %
MAGNESIUM SERPL-MCNC: 1.8 MG/DL (ref 1.6–2.4)
MCH RBC QN AUTO: 32.7 PG (ref 26–34)
MCHC RBC AUTO-ENTMCNC: 32.9 G/DL (ref 32–36)
MCV RBC AUTO: 99 FL (ref 80–100)
MONOCYTES # BLD AUTO: 0.44 X10*3/UL (ref 0.05–0.8)
MONOCYTES NFR BLD AUTO: 5.6 %
NEUTROPHILS # BLD AUTO: 4.89 X10*3/UL (ref 1.6–5.5)
NEUTROPHILS NFR BLD AUTO: 62 %
NRBC BLD-RTO: 0 /100 WBCS (ref 0–0)
PLATELET # BLD AUTO: 310 X10*3/UL (ref 150–450)
POTASSIUM SERPL-SCNC: 3.3 MMOL/L (ref 3.5–5.3)
PROT SERPL-MCNC: 7.2 G/DL (ref 6.4–8.2)
RBC # BLD AUTO: 4.19 X10*6/UL (ref 4–5.2)
SODIUM SERPL-SCNC: 139 MMOL/L (ref 136–145)
WBC # BLD AUTO: 7.9 X10*3/UL (ref 4.4–11.3)

## 2024-08-12 PROCEDURE — 83880 ASSAY OF NATRIURETIC PEPTIDE: CPT | Performed by: EMERGENCY MEDICINE

## 2024-08-12 PROCEDURE — 36415 COLL VENOUS BLD VENIPUNCTURE: CPT | Performed by: EMERGENCY MEDICINE

## 2024-08-12 PROCEDURE — 85025 COMPLETE CBC W/AUTO DIFF WBC: CPT | Performed by: EMERGENCY MEDICINE

## 2024-08-12 PROCEDURE — 96375 TX/PRO/DX INJ NEW DRUG ADDON: CPT

## 2024-08-12 PROCEDURE — 93971 EXTREMITY STUDY: CPT | Performed by: RADIOLOGY

## 2024-08-12 PROCEDURE — 93970 EXTREMITY STUDY: CPT | Performed by: RADIOLOGY

## 2024-08-12 PROCEDURE — G0378 HOSPITAL OBSERVATION PER HR: HCPCS

## 2024-08-12 PROCEDURE — 93970 EXTREMITY STUDY: CPT

## 2024-08-12 PROCEDURE — 93005 ELECTROCARDIOGRAM TRACING: CPT

## 2024-08-12 PROCEDURE — 84484 ASSAY OF TROPONIN QUANT: CPT | Performed by: EMERGENCY MEDICINE

## 2024-08-12 PROCEDURE — 99285 EMERGENCY DEPT VISIT HI MDM: CPT | Mod: 25

## 2024-08-12 PROCEDURE — 84484 ASSAY OF TROPONIN QUANT: CPT | Mod: 91 | Performed by: EMERGENCY MEDICINE

## 2024-08-12 PROCEDURE — 2500000001 HC RX 250 WO HCPCS SELF ADMINISTERED DRUGS (ALT 637 FOR MEDICARE OP): Performed by: EMERGENCY MEDICINE

## 2024-08-12 PROCEDURE — 93971 EXTREMITY STUDY: CPT | Mod: LT

## 2024-08-12 PROCEDURE — 83036 HEMOGLOBIN GLYCOSYLATED A1C: CPT | Mod: AHULAB | Performed by: NURSE PRACTITIONER

## 2024-08-12 PROCEDURE — 2500000004 HC RX 250 GENERAL PHARMACY W/ HCPCS (ALT 636 FOR OP/ED): Performed by: EMERGENCY MEDICINE

## 2024-08-12 PROCEDURE — 80053 COMPREHEN METABOLIC PANEL: CPT | Performed by: EMERGENCY MEDICINE

## 2024-08-12 PROCEDURE — 83735 ASSAY OF MAGNESIUM: CPT | Performed by: EMERGENCY MEDICINE

## 2024-08-12 PROCEDURE — 71046 X-RAY EXAM CHEST 2 VIEWS: CPT | Performed by: RADIOLOGY

## 2024-08-12 PROCEDURE — 84443 ASSAY THYROID STIM HORMONE: CPT | Performed by: NURSE PRACTITIONER

## 2024-08-12 PROCEDURE — 71046 X-RAY EXAM CHEST 2 VIEWS: CPT

## 2024-08-12 PROCEDURE — 99222 1ST HOSP IP/OBS MODERATE 55: CPT | Performed by: NURSE PRACTITIONER

## 2024-08-12 PROCEDURE — 83605 ASSAY OF LACTIC ACID: CPT | Performed by: EMERGENCY MEDICINE

## 2024-08-12 RX ORDER — ASPIRIN 81 MG/1
81 TABLET ORAL DAILY
COMMUNITY

## 2024-08-12 RX ORDER — FUROSEMIDE 10 MG/ML
20 INJECTION INTRAMUSCULAR; INTRAVENOUS ONCE
Status: COMPLETED | OUTPATIENT
Start: 2024-08-12 | End: 2024-08-12

## 2024-08-12 RX ORDER — METOPROLOL SUCCINATE 100 MG/1
100 TABLET, EXTENDED RELEASE ORAL DAILY
COMMUNITY

## 2024-08-12 RX ORDER — POTASSIUM CHLORIDE 1.5 G/1.58G
20 POWDER, FOR SOLUTION ORAL ONCE
Status: COMPLETED | OUTPATIENT
Start: 2024-08-12 | End: 2024-08-12

## 2024-08-12 RX ORDER — KETOROLAC TROMETHAMINE 30 MG/ML
15 INJECTION, SOLUTION INTRAMUSCULAR; INTRAVENOUS ONCE
Status: COMPLETED | OUTPATIENT
Start: 2024-08-12 | End: 2024-08-12

## 2024-08-12 RX ORDER — PANTOPRAZOLE SODIUM 40 MG/1
40 TABLET, DELAYED RELEASE ORAL
Status: DISCONTINUED | OUTPATIENT
Start: 2024-08-13 | End: 2024-08-14 | Stop reason: HOSPADM

## 2024-08-12 RX ORDER — HYDRALAZINE HYDROCHLORIDE 20 MG/ML
10 INJECTION INTRAMUSCULAR; INTRAVENOUS ONCE
Status: COMPLETED | OUTPATIENT
Start: 2024-08-12 | End: 2024-08-12

## 2024-08-12 RX ORDER — CHOLECALCIFEROL (VITAMIN D3) 50 MCG
50 TABLET ORAL DAILY
COMMUNITY

## 2024-08-12 ASSESSMENT — PAIN - FUNCTIONAL ASSESSMENT
PAIN_FUNCTIONAL_ASSESSMENT: 0-10
PAIN_FUNCTIONAL_ASSESSMENT: 0-10

## 2024-08-12 ASSESSMENT — COLUMBIA-SUICIDE SEVERITY RATING SCALE - C-SSRS
2. HAVE YOU ACTUALLY HAD ANY THOUGHTS OF KILLING YOURSELF?: NO
6. HAVE YOU EVER DONE ANYTHING, STARTED TO DO ANYTHING, OR PREPARED TO DO ANYTHING TO END YOUR LIFE?: NO
1. IN THE PAST MONTH, HAVE YOU WISHED YOU WERE DEAD OR WISHED YOU COULD GO TO SLEEP AND NOT WAKE UP?: NO

## 2024-08-12 ASSESSMENT — PAIN DESCRIPTION - ONSET: ONSET: GRADUAL

## 2024-08-12 ASSESSMENT — PAIN SCALES - GENERAL
PAINLEVEL_OUTOF10: 3
PAINLEVEL_OUTOF10: 8

## 2024-08-12 ASSESSMENT — ENCOUNTER SYMPTOMS: SHORTNESS OF BREATH: 1

## 2024-08-12 ASSESSMENT — PAIN DESCRIPTION - LOCATION: LOCATION: LEG

## 2024-08-12 ASSESSMENT — PAIN DESCRIPTION - PAIN TYPE: TYPE: CHRONIC PAIN

## 2024-08-12 ASSESSMENT — PAIN DESCRIPTION - FREQUENCY: FREQUENCY: INTERMITTENT

## 2024-08-12 ASSESSMENT — PAIN DESCRIPTION - ORIENTATION: ORIENTATION: RIGHT;LEFT

## 2024-08-12 ASSESSMENT — PAIN DESCRIPTION - PROGRESSION: CLINICAL_PROGRESSION: GRADUALLY WORSENING

## 2024-08-12 ASSESSMENT — PAIN DESCRIPTION - DESCRIPTORS: DESCRIPTORS: ACHING

## 2024-08-12 NOTE — ED TRIAGE NOTES
Pt presents to ED from home with daughters for BLE pain and left arm pain. Patient did suffer a fall in April but nothing recent. Patient has always had BLE pain but has gotten progressively worse and she is unable to walk.

## 2024-08-13 ENCOUNTER — APPOINTMENT (OUTPATIENT)
Dept: ORTHOPEDIC SURGERY | Facility: CLINIC | Age: 79
End: 2024-08-13
Payer: MEDICARE

## 2024-08-13 ENCOUNTER — APPOINTMENT (OUTPATIENT)
Dept: CARDIOLOGY | Facility: HOSPITAL | Age: 79
End: 2024-08-13
Payer: MEDICARE

## 2024-08-13 LAB
ANION GAP SERPL CALC-SCNC: 16 MMOL/L (ref 10–20)
AORTIC VALVE MEAN GRADIENT: 2 MMHG
AORTIC VALVE PEAK VELOCITY: 1 M/S
AV PEAK GRADIENT: 4 MMHG
AVA (PEAK VEL): 2.26 CM2
AVA (VTI): 1.93 CM2
BUN SERPL-MCNC: 17 MG/DL (ref 6–23)
CALCIUM SERPL-MCNC: 8.7 MG/DL (ref 8.6–10.3)
CHLORIDE SERPL-SCNC: 103 MMOL/L (ref 98–107)
CO2 SERPL-SCNC: 24 MMOL/L (ref 21–32)
CREAT SERPL-MCNC: 0.67 MG/DL (ref 0.5–1.05)
EGFRCR SERPLBLD CKD-EPI 2021: 90 ML/MIN/1.73M*2
EJECTION FRACTION APICAL 4 CHAMBER: 60.2
EJECTION FRACTION: 73 %
ERYTHROCYTE [DISTWIDTH] IN BLOOD BY AUTOMATED COUNT: 11.9 % (ref 11.5–14.5)
EST. AVERAGE GLUCOSE BLD GHB EST-MCNC: 126 MG/DL
GLUCOSE SERPL-MCNC: 116 MG/DL (ref 74–99)
HBA1C MFR BLD: 6 %
HCT VFR BLD AUTO: 41.5 % (ref 36–46)
HGB BLD-MCNC: 13.8 G/DL (ref 12–16)
LEFT ATRIUM VOLUME AREA LENGTH INDEX BSA: 21.9 ML/M2
LEFT VENTRICLE INTERNAL DIMENSION DIASTOLE: 4.46 CM (ref 3.5–6)
LEFT VENTRICULAR OUTFLOW TRACT DIAMETER: 1.76 CM
MAGNESIUM SERPL-MCNC: 1.7 MG/DL (ref 1.6–2.4)
MCH RBC QN AUTO: 33 PG (ref 26–34)
MCHC RBC AUTO-ENTMCNC: 33.3 G/DL (ref 32–36)
MCV RBC AUTO: 99 FL (ref 80–100)
MITRAL VALVE E/A RATIO: 0.67
NRBC BLD-RTO: 0 /100 WBCS (ref 0–0)
P OFFSET: 206 MS
P ONSET: 149 MS
PLATELET # BLD AUTO: 303 X10*3/UL (ref 150–450)
POTASSIUM SERPL-SCNC: 3.8 MMOL/L (ref 3.5–5.3)
Q ONSET: 225 MS
QRS COUNT: 9 BEATS
QRS DURATION: 94 MS
QT INTERVAL: 460 MS
QTC CALCULATION(BAZETT): 451 MS
QTC FREDERICIA: 454 MS
R AXIS: 5 DEGREES
RBC # BLD AUTO: 4.18 X10*6/UL (ref 4–5.2)
RIGHT VENTRICLE FREE WALL PEAK S': 11.7 CM/S
SODIUM SERPL-SCNC: 139 MMOL/L (ref 136–145)
T AXIS: 17 DEGREES
T OFFSET: 455 MS
TRICUSPID ANNULAR PLANE SYSTOLIC EXCURSION: 1.7 CM
TSH SERPL-ACNC: 1.65 MIU/L (ref 0.44–3.98)
VENTRICULAR RATE: 58 BPM
WBC # BLD AUTO: 8.9 X10*3/UL (ref 4.4–11.3)

## 2024-08-13 PROCEDURE — 96366 THER/PROPH/DIAG IV INF ADDON: CPT

## 2024-08-13 PROCEDURE — 96376 TX/PRO/DX INJ SAME DRUG ADON: CPT

## 2024-08-13 PROCEDURE — 2500000001 HC RX 250 WO HCPCS SELF ADMINISTERED DRUGS (ALT 637 FOR MEDICARE OP): Performed by: NURSE PRACTITIONER

## 2024-08-13 PROCEDURE — 93306 TTE W/DOPPLER COMPLETE: CPT

## 2024-08-13 PROCEDURE — 80048 BASIC METABOLIC PNL TOTAL CA: CPT | Performed by: NURSE PRACTITIONER

## 2024-08-13 PROCEDURE — 83735 ASSAY OF MAGNESIUM: CPT | Performed by: NURSE PRACTITIONER

## 2024-08-13 PROCEDURE — 85027 COMPLETE CBC AUTOMATED: CPT | Performed by: NURSE PRACTITIONER

## 2024-08-13 PROCEDURE — G0378 HOSPITAL OBSERVATION PER HR: HCPCS

## 2024-08-13 PROCEDURE — 93306 TTE W/DOPPLER COMPLETE: CPT | Performed by: INTERNAL MEDICINE

## 2024-08-13 PROCEDURE — 96367 TX/PROPH/DG ADDL SEQ IV INF: CPT

## 2024-08-13 PROCEDURE — 99222 1ST HOSP IP/OBS MODERATE 55: CPT | Performed by: INTERNAL MEDICINE

## 2024-08-13 PROCEDURE — 36415 COLL VENOUS BLD VENIPUNCTURE: CPT | Performed by: NURSE PRACTITIONER

## 2024-08-13 PROCEDURE — 2500000002 HC RX 250 W HCPCS SELF ADMINISTERED DRUGS (ALT 637 FOR MEDICARE OP, ALT 636 FOR OP/ED): Performed by: NURSE PRACTITIONER

## 2024-08-13 PROCEDURE — 96365 THER/PROPH/DIAG IV INF INIT: CPT

## 2024-08-13 PROCEDURE — 2500000004 HC RX 250 GENERAL PHARMACY W/ HCPCS (ALT 636 FOR OP/ED): Performed by: NURSE PRACTITIONER

## 2024-08-13 RX ORDER — MAGNESIUM SULFATE HEPTAHYDRATE 40 MG/ML
2 INJECTION, SOLUTION INTRAVENOUS ONCE
Status: COMPLETED | OUTPATIENT
Start: 2024-08-13 | End: 2024-08-13

## 2024-08-13 RX ORDER — LEVOTHYROXINE SODIUM 88 UG/1
88 TABLET ORAL
Status: DISCONTINUED | OUTPATIENT
Start: 2024-08-13 | End: 2024-08-14 | Stop reason: HOSPADM

## 2024-08-13 RX ORDER — POLYETHYLENE GLYCOL 3350 17 G/17G
17 POWDER, FOR SOLUTION ORAL DAILY
Status: DISCONTINUED | OUTPATIENT
Start: 2024-08-13 | End: 2024-08-14 | Stop reason: HOSPADM

## 2024-08-13 RX ORDER — POTASSIUM CHLORIDE 14.9 MG/ML
20 INJECTION INTRAVENOUS ONCE
Status: COMPLETED | OUTPATIENT
Start: 2024-08-13 | End: 2024-08-13

## 2024-08-13 RX ORDER — METOPROLOL TARTRATE 50 MG/1
50 TABLET ORAL 2 TIMES DAILY
Status: DISCONTINUED | OUTPATIENT
Start: 2024-08-13 | End: 2024-08-14 | Stop reason: HOSPADM

## 2024-08-13 RX ORDER — ATORVASTATIN CALCIUM 80 MG/1
80 TABLET, FILM COATED ORAL NIGHTLY
Status: DISCONTINUED | OUTPATIENT
Start: 2024-08-13 | End: 2024-08-14 | Stop reason: HOSPADM

## 2024-08-13 RX ORDER — FUROSEMIDE 10 MG/ML
40 INJECTION INTRAMUSCULAR; INTRAVENOUS 2 TIMES DAILY
Status: DISCONTINUED | OUTPATIENT
Start: 2024-08-13 | End: 2024-08-14 | Stop reason: HOSPADM

## 2024-08-13 RX ORDER — PAROXETINE HYDROCHLORIDE 20 MG/1
20 TABLET, FILM COATED ORAL DAILY
Status: DISCONTINUED | OUTPATIENT
Start: 2024-08-13 | End: 2024-08-14 | Stop reason: HOSPADM

## 2024-08-13 RX ORDER — DAPAGLIFLOZIN 10 MG/1
10 TABLET, FILM COATED ORAL DAILY
Status: DISCONTINUED | OUTPATIENT
Start: 2024-08-13 | End: 2024-08-14 | Stop reason: HOSPADM

## 2024-08-13 RX ORDER — ACETAMINOPHEN 325 MG/1
650 TABLET ORAL EVERY 6 HOURS PRN
Status: DISCONTINUED | OUTPATIENT
Start: 2024-08-13 | End: 2024-08-14 | Stop reason: HOSPADM

## 2024-08-13 RX ORDER — ASPIRIN 81 MG/1
81 TABLET ORAL DAILY
Status: DISCONTINUED | OUTPATIENT
Start: 2024-08-13 | End: 2024-08-14 | Stop reason: HOSPADM

## 2024-08-13 SDOH — SOCIAL STABILITY: SOCIAL INSECURITY: DOES ANYONE TRY TO KEEP YOU FROM HAVING/CONTACTING OTHER FRIENDS OR DOING THINGS OUTSIDE YOUR HOME?: NO

## 2024-08-13 SDOH — SOCIAL STABILITY: SOCIAL INSECURITY: ARE THERE ANY APPARENT SIGNS OF INJURIES/BEHAVIORS THAT COULD BE RELATED TO ABUSE/NEGLECT?: NO

## 2024-08-13 SDOH — SOCIAL STABILITY: SOCIAL INSECURITY: HAVE YOU HAD ANY THOUGHTS OF HARMING ANYONE ELSE?: NO

## 2024-08-13 SDOH — SOCIAL STABILITY: SOCIAL INSECURITY: ARE YOU OR HAVE YOU BEEN THREATENED OR ABUSED PHYSICALLY, EMOTIONALLY, OR SEXUALLY BY ANYONE?: NO

## 2024-08-13 SDOH — SOCIAL STABILITY: SOCIAL INSECURITY: DO YOU FEEL ANYONE HAS EXPLOITED OR TAKEN ADVANTAGE OF YOU FINANCIALLY OR OF YOUR PERSONAL PROPERTY?: NO

## 2024-08-13 SDOH — SOCIAL STABILITY: SOCIAL INSECURITY: DO YOU FEEL UNSAFE GOING BACK TO THE PLACE WHERE YOU ARE LIVING?: NO

## 2024-08-13 SDOH — SOCIAL STABILITY: SOCIAL INSECURITY: HAVE YOU HAD THOUGHTS OF HARMING ANYONE ELSE?: NO

## 2024-08-13 SDOH — SOCIAL STABILITY: SOCIAL INSECURITY: ABUSE: ADULT

## 2024-08-13 SDOH — SOCIAL STABILITY: SOCIAL INSECURITY: HAS ANYONE EVER THREATENED TO HURT YOUR FAMILY OR YOUR PETS?: NO

## 2024-08-13 ASSESSMENT — ACTIVITIES OF DAILY LIVING (ADL)
LACK_OF_TRANSPORTATION: PATIENT UNABLE TO ANSWER
JUDGMENT_ADEQUATE_SAFELY_COMPLETE_DAILY_ACTIVITIES: YES
HEARING - LEFT EAR: FUNCTIONAL
WALKS IN HOME: NEEDS ASSISTANCE
DRESSING YOURSELF: NEEDS ASSISTANCE
ADEQUATE_TO_COMPLETE_ADL: YES
FEEDING YOURSELF: NEEDS ASSISTANCE
PATIENT'S MEMORY ADEQUATE TO SAFELY COMPLETE DAILY ACTIVITIES?: YES
BATHING: NEEDS ASSISTANCE
TOILETING: NEEDS ASSISTANCE
LACK_OF_TRANSPORTATION: NO
HEARING - RIGHT EAR: FUNCTIONAL
GROOMING: NEEDS ASSISTANCE

## 2024-08-13 ASSESSMENT — PAIN - FUNCTIONAL ASSESSMENT: PAIN_FUNCTIONAL_ASSESSMENT: 0-10

## 2024-08-13 ASSESSMENT — COGNITIVE AND FUNCTIONAL STATUS - GENERAL
STANDING UP FROM CHAIR USING ARMS: A LITTLE
HELP NEEDED FOR BATHING: A LITTLE
DRESSING REGULAR UPPER BODY CLOTHING: A LITTLE
MOVING TO AND FROM BED TO CHAIR: A LITTLE
CLIMB 3 TO 5 STEPS WITH RAILING: A LITTLE
TOILETING: A LITTLE
HELP NEEDED FOR BATHING: A LITTLE
CLIMB 3 TO 5 STEPS WITH RAILING: A LOT
MOBILITY SCORE: 20
PATIENT BASELINE BEDBOUND: NO
DRESSING REGULAR UPPER BODY CLOTHING: A LITTLE
TURNING FROM BACK TO SIDE WHILE IN FLAT BAD: A LITTLE
MOBILITY SCORE: 21
DAILY ACTIVITIY SCORE: 20
WALKING IN HOSPITAL ROOM: A LITTLE
DAILY ACTIVITIY SCORE: 20
STANDING UP FROM CHAIR USING ARMS: A LITTLE
MOBILITY SCORE: 19
DAILY ACTIVITIY SCORE: 23
TOILETING: A LITTLE
TOILETING: A LITTLE
MOVING TO AND FROM BED TO CHAIR: A LITTLE
DRESSING REGULAR LOWER BODY CLOTHING: A LITTLE
DRESSING REGULAR LOWER BODY CLOTHING: A LITTLE
CLIMB 3 TO 5 STEPS WITH RAILING: A LITTLE

## 2024-08-13 ASSESSMENT — PAIN DESCRIPTION - ORIENTATION: ORIENTATION: RIGHT;LEFT

## 2024-08-13 ASSESSMENT — LIFESTYLE VARIABLES
AUDIT-C TOTAL SCORE: -1
SKIP TO QUESTIONS 9-10: 0
HOW MANY STANDARD DRINKS CONTAINING ALCOHOL DO YOU HAVE ON A TYPICAL DAY: PATIENT UNABLE TO ANSWER
HOW OFTEN DO YOU HAVE A DRINK CONTAINING ALCOHOL: PATIENT UNABLE TO ANSWER
HOW OFTEN DO YOU HAVE 6 OR MORE DRINKS ON ONE OCCASION: PATIENT UNABLE TO ANSWER
AUDIT-C TOTAL SCORE: -1

## 2024-08-13 ASSESSMENT — PATIENT HEALTH QUESTIONNAIRE - PHQ9
SUM OF ALL RESPONSES TO PHQ9 QUESTIONS 1 & 2: 0
2. FEELING DOWN, DEPRESSED OR HOPELESS: NOT AT ALL
1. LITTLE INTEREST OR PLEASURE IN DOING THINGS: NOT AT ALL

## 2024-08-13 ASSESSMENT — PAIN DESCRIPTION - LOCATION: LOCATION: FOOT

## 2024-08-13 ASSESSMENT — PAIN SCALES - GENERAL
PAINLEVEL_OUTOF10: 0 - NO PAIN
PAINLEVEL_OUTOF10: 0 - NO PAIN
PAINLEVEL_OUTOF10: 3

## 2024-08-13 NOTE — PROGRESS NOTES
08/13/24 0658   [facility] Disability Status   Are you deaf or do you have serious difficulty hearing? N   Are you blind or do you have serious difficulty seeing, even when wearing glasses? N   Because of a physical, mental, or emotional condition, do you have serious difficulty concentrating, remembering, or making decisions? (5 years old or older) N   Do you have serious difficulty walking or climbing stairs? Y   Do you have serious difficulty dressing or bathing? N   Because of a physical, mental, or emotional condition, do you have serious difficulty doing errands alone such as visiting the doctor? Y

## 2024-08-13 NOTE — ED PROVIDER NOTES
HPI   Chief Complaint   Patient presents with    Leg Swelling     BLE       HPI  Patient is a 78-year-old female with a past medical history significant for hypertension, hyperlipidemia, prior intracranial hemorrhage after traumatic injury, chronic left knee issues status post injections by orthopedic team who presents emergency room with a chief complaint of bilateral lower extremity pain and swelling as well as left upper extremity pain and swelling.  Patient denies any recent falls or injury.  Her legs have become significantly more swollen over the last few days.  She feels pain to the back of the legs bilaterally.  It is making it hard for her to walk as she also has chronic knee issues.  Additionally, she has developed some dyspnea on exertion and left arm pain without chest discomfort.  She denies any history of heart failure.  Patient is mostly Albanian speaking but is able to understand some English and family members at bedside translate.  She has no fever, chest pain or shortness of breath at rest.  She is not anticoagulated and denies any history of blood clots, immobility or cancer.    PMHx: As above  PSHx: Hip replacement last year, thyroidectomy  FamilyHx: Cancer  SocialHx: Denies  Allergies: NKDA  Medications: See Medication Reconciliation     ROS  As above otherwise denies    Physical Exam    GENERAL: Awake and Alert, No Acute Distress  HEENT: AT/NC, PERRL, EOMI, Normal Oropharynx, No Signs of Dehydration  NECK: Normal Inspection, No JVD  CARDIOVASCULAR: RRR, No M/R/G  RESPIRATORY: CTA Bilaterally, No Wheezes, Rales or Rhonchi, Chest Wall Non-tender  ABDOMEN: Soft, non-tender abdomen, Normal Bowel Sounds, No Distention  BACK: No CVA Tenderness  SKIN: Normal Color, Warm, Dry, No Rashes   EXTREMITIES: Non-Tender, Full ROM, bilateral lower extremity pedal Edema up to the knees  NEURO: A&O x 3, Normal Motor and Sensation, Normal Mood and Affect    Nursing Assessment and Vitals Reviewed    EKG showed a  sinus rhythm at 58 bpm.  No ischemic ST elevations.  No axis deviation.    Medical Decision  Patient is a 78-year-old female with a past medical history significant for hypertension, hyperlipidemia, prior intracranial hemorrhage after traumatic injury, chronic left knee issues status post injections by orthopedic team who presents emergency room with a chief complaint of bilateral lower extremity pain and swelling as well as left upper extremity pain and swelling.  Patient denies any recent falls or injury.  Her legs have become significantly more swollen over the last few days.  She feels pain to the back of the legs bilaterally.  It is making it hard for her to walk as she also has chronic knee issues.  Additionally, she has developed some dyspnea on exertion and left arm pain without chest discomfort.  She denies any history of heart failure.  Patient is mostly Ukrainian speaking but is able to understand some English and family members at bedside translate.  She has no fever, chest pain or shortness of breath at rest.  She is not anticoagulated and denies any history of blood clots, immobility or cancer.    On evaluation left upper extremity is within normal limits but she has swelling of bilateral lower extremities with diffuse tenderness to palpation but no signs of weeping, erythema, crepitus.    Workup for patient included labs that revealed hypokalemia which was repleted orally.  BNP is slightly elevated.  Troponin is negative x 2.  She has no leukocytosis or anemia.  Vascular ultrasound showed no signs of DVT but bilateral Baker cysts and features suggesting decreased cardiac output with slow flow.  Upper extremity ultrasound was unremarkable.  Chest x-ray shows signs of volume overload.  She is started on Lasix.  Patient will be admitted for further workup and management due to concern for new onset heart failure, diuresis as family denies any history of this in the past.                Patient History    Past Medical History:   Diagnosis Date    Bilateral knee pain     Bilateral shoulder pain     Hematoma 04/07/2024    subdural    Personal history of other diseases of the circulatory system 08/08/2013    History of hypertension    Personal history of other mental and behavioral disorders 08/08/2013    History of depression    Pure hypercholesterolemia, unspecified 09/19/2013    Low-density-lipoid-type (LDL) hyperlipoproteinemia    Thyroid condition      Past Surgical History:   Procedure Laterality Date    GALLBLADDER SURGERY  11/30/2016    Gallbladder Surgery    OTHER SURGICAL HISTORY  10/22/2019    Hip replacement    OTHER SURGICAL HISTORY  10/22/2019    Thyroidectomy total     No family history on file.  Social History     Tobacco Use    Smoking status: Never    Smokeless tobacco: Never   Vaping Use    Vaping status: Never Used   Substance Use Topics    Alcohol use: Yes     Comment: occasional    Drug use: Never       Physical Exam   ED Triage Vitals [08/12/24 1307]   Temperature Heart Rate Respirations BP   36.3 °C (97.4 °F) 58 18 152/78      Pulse Ox Temp Source Heart Rate Source Patient Position   94 % Temporal Monitor Sitting      BP Location FiO2 (%)     Left arm --       Physical Exam      ED Course & MDM   Diagnoses as of 08/12/24 2108   Leg edema   Hypervolemia, unspecified hypervolemia type                 No data recorded     [place] Coma Scale Score: 15 (08/12/24 1310 : Shira Marin RN)                           Medical Decision Making      Procedure  Procedures     Rosmery Fiore MD  08/12/24 2108       Rosmery Fiore MD  08/12/24 2141

## 2024-08-13 NOTE — PROGRESS NOTES
Home with daughter      08/13/24 0657   Current Planned Discharge Disposition   Current Planned Discharge Disposition Home

## 2024-08-13 NOTE — H&P
History Of Present Illness  Marybeth Duggan is a 78 y.o. female with past medical history significant for CAD, HTN, HLD, Hypothyroidism, Vitamin D deficiency, Obesity, GERD, OA knees, shoulders, hips, DKD,  traumatic subdural hematoma, c/b seizure, anxiety    Presented to [facility] ED with complaints of bilateral lower extremity and left upper extremity swelling and pain. Patient with limited English, no family available. Attempted to call daughter, no answer. Per ED provider, patient reports her lower extremities have significantly become more swollen over the course of the past few days. Has pain to the back of both legs. She is having a difficult time ambulating due to this. She also complaints of dyspnea on exertion.     ED COURSE: /84 otherwise VSS, no supplemental 02 needs. Labs notable for blood glucose of 198, potassium 3.3, . CXR volume overload. US BLE negative for DVT. Received Lasix 20mg IVP, Hydralazine 10mg IVP, Toradol 15mg IV, potassium 20mEq PO.      Past Medical History  She has a past medical history of Bilateral knee pain, Bilateral shoulder pain, Hematoma (04/07/2024), Personal history of other diseases of the circulatory system (08/08/2013), Personal history of other mental and behavioral disorders (08/08/2013), Pure hypercholesterolemia, unspecified (09/19/2013), and Thyroid condition.    Surgical History  She has a past surgical history that includes Other surgical history (10/22/2019); Other surgical history (10/22/2019); and Gallbladder surgery (11/30/2016).     Social History  She reports that she has never smoked. She has never used smokeless tobacco. She reports current alcohol use. She reports that she does not use drugs.    Family History  No family history on file.     Allergies  Patient has no known allergies.    Review of Systems   Respiratory:  Positive for shortness of breath.    Cardiovascular:  Positive for leg swelling.   All other systems reviewed and are negative.        Physical Exam  Constitutional: NAD  Eyes: no icterus   ENMT: mucous membranes moist  Head/Neck: supple  Resp/thorax: faint bibasilar crackles, no cough, on RA  C/V: RRR, no murmurs  : no Lawson  GI: S/ND/NT, + BS  M/S: no joint swelling  Extremities: +1 BLE edema  Neurological: non-focal  Skin: Warm and dry       Last Recorded Vitals  /88   Pulse 56   Temp 36.8 °C (98.2 °F) (Oral)   Resp 18   Wt 90.7 kg (200 lb)   SpO2 96%     Relevant Results  Results for orders placed or performed during the hospital encounter of 08/12/24 (from the past 24 hour(s))   CBC and Auto Differential   Result Value Ref Range    WBC 7.9 4.4 - 11.3 x10*3/uL    nRBC 0.0 0.0 - 0.0 /100 WBCs    RBC 4.19 4.00 - 5.20 x10*6/uL    Hemoglobin 13.7 12.0 - 16.0 g/dL    Hematocrit 41.6 36.0 - 46.0 %    MCV 99 80 - 100 fL    MCH 32.7 26.0 - 34.0 pg    MCHC 32.9 32.0 - 36.0 g/dL    RDW 12.1 11.5 - 14.5 %    Platelets 310 150 - 450 x10*3/uL    Neutrophils % 62.0 40.0 - 80.0 %    Immature Granulocytes %, Automated 0.8 0.0 - 0.9 %    Lymphocytes % 29.2 13.0 - 44.0 %    Monocytes % 5.6 2.0 - 10.0 %    Eosinophils % 1.9 0.0 - 6.0 %    Basophils % 0.5 0.0 - 2.0 %    Neutrophils Absolute 4.89 1.60 - 5.50 x10*3/uL    Immature Granulocytes Absolute, Automated 0.06 0.00 - 0.50 x10*3/uL    Lymphocytes Absolute 2.30 0.80 - 3.00 x10*3/uL    Monocytes Absolute 0.44 0.05 - 0.80 x10*3/uL    Eosinophils Absolute 0.15 0.00 - 0.40 x10*3/uL    Basophils Absolute 0.04 0.00 - 0.10 x10*3/uL   Comprehensive metabolic panel   Result Value Ref Range    Glucose 198 (H) 74 - 99 mg/dL    Sodium 139 136 - 145 mmol/L    Potassium 3.3 (L) 3.5 - 5.3 mmol/L    Chloride 100 98 - 107 mmol/L    Bicarbonate 28 21 - 32 mmol/L    Anion Gap 14 10 - 20 mmol/L    Urea Nitrogen 19 6 - 23 mg/dL    Creatinine 0.72 0.50 - 1.05 mg/dL    eGFR 86 >60 mL/min/1.73m*2    Calcium 9.0 8.6 - 10.3 mg/dL    Albumin 3.8 3.4 - 5.0 g/dL    Alkaline Phosphatase 106 33 - 136 U/L    Total Protein 7.2  6.4 - 8.2 g/dL    AST 17 9 - 39 U/L    Bilirubin, Total 0.6 0.0 - 1.2 mg/dL    ALT 15 7 - 45 U/L   Lactate   Result Value Ref Range    Lactate 1.5 0.4 - 2.0 mmol/L   Magnesium   Result Value Ref Range    Magnesium 1.80 1.60 - 2.40 mg/dL   Troponin I, High Sensitivity, Initial   Result Value Ref Range    Troponin I, High Sensitivity 7 0 - 13 ng/L   B-type natriuretic peptide   Result Value Ref Range     (H) 0 - 99 pg/mL   Troponin, High Sensitivity, 1 Hour   Result Value Ref Range    Troponin I, High Sensitivity 9 0 - 13 ng/L        Assessment/Plan     ?New onset HF  -Echo   -Start iv diuresis  -Monitor DW, strict I/Os, renal function/lytes  -Monitor on tele  -Consult cardiology     Hypokalemia  -Replaced  -Repeat am labs     OA of bilateral knees  Inability to ambulate  -prn analgesics  -pt/ot  -fall precautions     HTN  -Elevated BP on admission  -Resume home regimen and monitor     HLD  -Statin     CAD  -Statin, not on antiplatelet therapy     Prediabetes  -Recheck A1c  -Blood glucose 198 on arrival     Hypothyroidism  -Check tsh, reflex ft4  -Continue synthroid    GERD  -PPI    DVT prophylaxis  -SCDs  -evaluate need for pharmacological agents if prolonged stay - **recent hx of subdural hematoma     Discharge planning  -from home  -?PT/OT eval     * 55 minutes total spent on patient's care today; >50% time spent on counseling/coordination of care         DEANDRE Espinoza-CNP

## 2024-08-13 NOTE — CARE PLAN
The patient's goals for the shift include      The clinical goals for the shift include pt will remain hds      Problem: Pain - Adult  Goal: Verbalizes/displays adequate comfort level or baseline comfort level  Outcome: Progressing     Problem: Safety - Adult  Goal: Free from fall injury  Outcome: Progressing     Problem: Discharge Planning  Goal: Discharge to home or other facility with appropriate resources  Outcome: Progressing     Problem: Chronic Conditions and Co-morbidities  Goal: Patient's chronic conditions and co-morbidity symptoms are monitored and maintained or improved  Outcome: Progressing     Problem: Fall/Injury  Goal: Not fall by end of shift  Outcome: Progressing  Goal: Be free from injury by end of the shift  Outcome: Progressing  Goal: Verbalize understanding of personal risk factors for fall in the hospital  Outcome: Progressing  Goal: Verbalize understanding of risk factor reduction measures to prevent injury from fall in the home  Outcome: Progressing  Goal: Use assistive devices by end of the shift  Outcome: Progressing  Goal: Pace activities to prevent fatigue by end of the shift  Outcome: Progressing

## 2024-08-13 NOTE — PROGRESS NOTES
"Marybeth Duggan is a 78 y.o. female on day 0 of admission presenting with Lower extremity edema.    Subjective   Saw and examined patient. Patient alert. Says she does not speak English well. NAD. On RA.         Objective     Physical Exam  Constitutional: NAD  Eyes: no icterus   ENMT: mucous membranes moist  Head/Neck: supple  Resp/thorax: CTA bilat, no cough, on RA  C/V: RRR, no murmurs  : no Lawson  GI: S/ND/NT, + BS  M/S: no joint swelling  Extremities: 1+ pitting edema bilaterally  Neurological: non-focal  Skin: Warm and dry    Last Recorded Vitals  Blood pressure 116/75, pulse 60, temperature 36.1 °C (97 °F), temperature source Temporal, resp. rate 17, height 1.6 m (5' 3\"), weight 90.7 kg (200 lb), SpO2 97%.  Intake/Output last 3 Shifts:  I/O last 3 completed shifts:  In: 100 (1.1 mL/kg) [IV Piggyback:100]  Out: 900 (9.9 mL/kg) [Urine:900 (0.3 mL/kg/hr)]  Weight: 90.7 kg     Relevant Results  Results for orders placed or performed during the hospital encounter of 08/12/24 (from the past 24 hour(s))   CBC and Auto Differential   Result Value Ref Range    WBC 7.9 4.4 - 11.3 x10*3/uL    nRBC 0.0 0.0 - 0.0 /100 WBCs    RBC 4.19 4.00 - 5.20 x10*6/uL    Hemoglobin 13.7 12.0 - 16.0 g/dL    Hematocrit 41.6 36.0 - 46.0 %    MCV 99 80 - 100 fL    MCH 32.7 26.0 - 34.0 pg    MCHC 32.9 32.0 - 36.0 g/dL    RDW 12.1 11.5 - 14.5 %    Platelets 310 150 - 450 x10*3/uL    Neutrophils % 62.0 40.0 - 80.0 %    Immature Granulocytes %, Automated 0.8 0.0 - 0.9 %    Lymphocytes % 29.2 13.0 - 44.0 %    Monocytes % 5.6 2.0 - 10.0 %    Eosinophils % 1.9 0.0 - 6.0 %    Basophils % 0.5 0.0 - 2.0 %    Neutrophils Absolute 4.89 1.60 - 5.50 x10*3/uL    Immature Granulocytes Absolute, Automated 0.06 0.00 - 0.50 x10*3/uL    Lymphocytes Absolute 2.30 0.80 - 3.00 x10*3/uL    Monocytes Absolute 0.44 0.05 - 0.80 x10*3/uL    Eosinophils Absolute 0.15 0.00 - 0.40 x10*3/uL    Basophils Absolute 0.04 0.00 - 0.10 x10*3/uL   Comprehensive metabolic panel "   Result Value Ref Range    Glucose 198 (H) 74 - 99 mg/dL    Sodium 139 136 - 145 mmol/L    Potassium 3.3 (L) 3.5 - 5.3 mmol/L    Chloride 100 98 - 107 mmol/L    Bicarbonate 28 21 - 32 mmol/L    Anion Gap 14 10 - 20 mmol/L    Urea Nitrogen 19 6 - 23 mg/dL    Creatinine 0.72 0.50 - 1.05 mg/dL    eGFR 86 >60 mL/min/1.73m*2    Calcium 9.0 8.6 - 10.3 mg/dL    Albumin 3.8 3.4 - 5.0 g/dL    Alkaline Phosphatase 106 33 - 136 U/L    Total Protein 7.2 6.4 - 8.2 g/dL    AST 17 9 - 39 U/L    Bilirubin, Total 0.6 0.0 - 1.2 mg/dL    ALT 15 7 - 45 U/L   Lactate   Result Value Ref Range    Lactate 1.5 0.4 - 2.0 mmol/L   Magnesium   Result Value Ref Range    Magnesium 1.80 1.60 - 2.40 mg/dL   Troponin I, High Sensitivity, Initial   Result Value Ref Range    Troponin I, High Sensitivity 7 0 - 13 ng/L   B-type natriuretic peptide   Result Value Ref Range     (H) 0 - 99 pg/mL   Hemoglobin A1C   Result Value Ref Range    Hemoglobin A1C 6.0 (H) see below %    Estimated Average Glucose 126 Not Established mg/dL   Troponin, High Sensitivity, 1 Hour   Result Value Ref Range    Troponin I, High Sensitivity 9 0 - 13 ng/L   TSH with reflex to Free T4 if abnormal   Result Value Ref Range    Thyroid Stimulating Hormone 1.65 0.44 - 3.98 mIU/L   ECG 12 lead   Result Value Ref Range    Ventricular Rate 58 BPM    QRS Duration 94 ms    QT Interval 460 ms    QTC Calculation(Bazett) 451 ms    R Axis 5 degrees    T Axis 17 degrees    QRS Count 9 beats    Q Onset 225 ms    P Onset 149 ms    P Offset 206 ms    T Offset 455 ms    QTC Fredericia 454 ms   Magnesium   Result Value Ref Range    Magnesium 1.70 1.60 - 2.40 mg/dL   CBC   Result Value Ref Range    WBC 8.9 4.4 - 11.3 x10*3/uL    nRBC 0.0 0.0 - 0.0 /100 WBCs    RBC 4.18 4.00 - 5.20 x10*6/uL    Hemoglobin 13.8 12.0 - 16.0 g/dL    Hematocrit 41.5 36.0 - 46.0 %    MCV 99 80 - 100 fL    MCH 33.0 26.0 - 34.0 pg    MCHC 33.3 32.0 - 36.0 g/dL    RDW 11.9 11.5 - 14.5 %    Platelets 303 150 - 450  x10*3/uL   Basic Metabolic Panel   Result Value Ref Range    Glucose 116 (H) 74 - 99 mg/dL    Sodium 139 136 - 145 mmol/L    Potassium 3.8 3.5 - 5.3 mmol/L    Chloride 103 98 - 107 mmol/L    Bicarbonate 24 21 - 32 mmol/L    Anion Gap 16 10 - 20 mmol/L    Urea Nitrogen 17 6 - 23 mg/dL    Creatinine 0.67 0.50 - 1.05 mg/dL    eGFR 90 >60 mL/min/1.73m*2    Calcium 8.7 8.6 - 10.3 mg/dL   Transthoracic Echo (TTE) Complete   Result Value Ref Range    AV pk rk 1.00 m/s    AV mn grad 2.0 mmHg    LVOT diam 1.76 cm    MV E/A ratio 0.67     LA vol index A/L 21.9 ml/m2    Tricuspid annular plane systolic excursion 1.7 cm    LV EF 73 %    RV free wall pk S' 11.70 cm/s    LVIDd 4.46 cm    Aortic Valve Area by Continuity of VTI 1.93 cm2    Aortic Valve Area by Continuity of Peak Velocity 2.26 cm2    AV pk grad 4.0 mmHg    LV A4C EF 60.2        Imaging Results  CXR shows enlarged heart and volume overload  US doppler UE and LE neg for thrombus    Medications:  aspirin, 81 mg, oral, Daily  atorvastatin, 80 mg, oral, Nightly  dapagliflozin propanediol, 10 mg, oral, Daily  furosemide, 40 mg, intravenous, BID  levothyroxine, 88 mcg, oral, Daily before breakfast  metoprolol tartrate, 50 mg, oral, BID  pantoprazole, 40 mg, oral, Daily before breakfast  PARoxetine, 20 mg, oral, Daily  polyethylene glycol, 17 g, oral, Daily       PRN medications: acetaminophen, oxygen     Assessment/Plan     New onset HF  -cardiology following, start Farxiga, discharge on losartan/hydrochlorothiazide 100/25 daily  -continue IV diuresis  -Echo shows EF: 70-75%, indeterminate left ventricular filling  -Monitor DW, strict I/Os, renal function/lytes  -Monitor on tele     OA of bilateral knees  Inability to ambulate  -prn analgesics  -pt/ot  -fall precautions      HTN  -Elevated BP on admission  -Resume home regimen and monitor      HLD  -Statin, ASA     CAD  -Statin, not on antiplatelet therapy      Prediabetes  -A1c 6%  -Blood glucose 116,  stabilizing  -continue to monitor     Hypothyroidism  -TSH WNL  -Continue synthroid     GERD  -PPI     DVT prophylaxis  -SCDs  -no pharmacological agents due to recent hx of subdural hematoma      Discharge planning  -PT/OT pending    I spent 35 minutes in the professional and overall care of this patient.      Khoi Hugo, PA-C  [facility]

## 2024-08-13 NOTE — PROGRESS NOTES
Transitional Care Coordination Progress Note:  Plan per Medical/Surgical team: treatment of CHF with IV lasix, toradol, oxygen, cardio consult, ECHO pending   Status: Observation  Payor source: [facility]  Discharge disposition: Home with daughter   Potential Barriers: PT/OT bhavna pending  speaks Maltese - use MARTII  ADOD: 8/14/2024   WAYNE Davis RN, BSN Transitional Care Coordinator ED# [phone]      08/13/24 0658   Discharge Planning   Living Arrangements Children   Support Systems Children   Assistance Needed pain management   Type of Residence Private residence   Number of Stairs to Enter Residence 3   Number of Stairs Within Residence 0   Do you have animals or pets at home? No   Home or Post Acute Services None   Expected Discharge Disposition Home   Does the patient need discharge transport arranged? No   Financial Resource Strain   How hard is it for you to pay for the very basics like food, housing, medical care, and heating? Not hard   Housing Stability   In the last 12 months, was there a time when you were not able to pay the mortgage or rent on time? N   In the past 12 months, how many times have you moved where you were living? 1   At any time in the past 12 months, were you homeless or living in a shelter (including now)? N   Transportation Needs   In the past 12 months, has lack of transportation kept you from medical appointments or from getting medications? no   In the past 12 months, has lack of transportation kept you from meetings, work, or from getting things needed for daily living? No

## 2024-08-13 NOTE — PROGRESS NOTES
Pharmacy Medication History Review    Marybeth Duggan is a 78 y.o. female admitted for Lower extremity edema. Pharmacy reviewed the patient's [id number] medications and allergies for accuracy.    The list below reflectives the updated PTA list. Please review each medication in order reconciliation for additional clarification and justification.  Prior to Admission Medications   Prescriptions Last Dose Informant   PARoxetine (Paxil) 20 mg tablet Unknown Child   Sig: Take 1 tablet (20 mg) by mouth once daily.   acetaminophen (Tylenol) 325 mg tablet Unknown Child   Sig: Take 2 by mouth every 6 hours for 5 days.   Patient not taking: Reported on 8/12/2024   aspirin 81 mg EC tablet Unknown Child   Sig: Take 1 tablet (81 mg) by mouth once daily.   atorvastatin (Lipitor) 80 mg tablet Unknown Child   Sig: TAKE 1 TABLET BY MOUTH AT  BEDTIME   cholecalciferol (Vitamin D3) 50 MCG (2000 UT) tablet Unknown Child   Sig: Take 1 tablet (50 mcg) by mouth once daily.   levothyroxine (Synthroid, Levoxyl) 88 mcg tablet Unknown Child   Sig: Take 1 tablet (88 mcg) by mouth once daily in the morning. Take before meals.   metoprolol succinate XL (Toprol-XL) 100 mg 24 hr tablet Unknown Child   Sig: Take 1 tablet (100 mg) by mouth once daily. Do not crush or chew.      Facility-Administered Medications: None         The list below reflectives the updated allergy list. Please review each documented allergy for additional clarification and justification.  Allergies  Reviewed by RAISA Espinoza on 8/12/2024   No Known Allergies         Below are additional concerns with the patient's PTA list.  Spoke to daughter for med dillon Damon CPhT

## 2024-08-13 NOTE — CONSULTS
"Inpatient consult to Cardiology  Consult performed by: DEANDRE Wheeler-CNP  Consult ordered by: RAISA Espinoza  Reason for consult: \"fluid overload, concern for new onset CHF\"        Cards: Conner  History Of Present Illness:    Marybeth Duggan is a 78 y.o. female with past medical history significant for CAD, HTN, HLD, Hypothyroidism, Vitamin D deficiency, Obesity, GERD, OA knees, shoulders, hips, DKD,  traumatic subdural hematoma, c/b seizure, anxiety presents to [facility] ED with bilateral upper and lower extremity swelling along with difficulty ambulating and WARNER.  Cardiology is consulted for \"fluid overload, concern for new onset CHF\"    Patient is Amharic speaking and speaks limited English; was able to endorse swelling in her lower extremities.  I was able to call the daughter and get a hold of her.  Patient lives at home alone with daughters to go to check on her they claim over the last couple days patient has been explaining of extreme pain in her lower extremities as well as swelling in both of her legs.  She also complained of swelling in both of her arms.  They said this was not getting any better so decided to take her to the emergency room.  They said as far as they know if she takes her pills on her own; she is incontinent she does wear a diaper most of the time per the family.      Afebrile, heart rate 67, blood pressure 143/70, 93% on room air.  Notable labs on admission BUNs/CR 17/0.67, magnesium 1.7, , high sensitive troponins negative, TSH 1.65, H&H 13.8/41.5, chest x-ray with congestion.  Bilateral duplex is upper and lower extremities negative.  In the ER she got 20 mg of IV Lasix push, hydralazine 10mg IV push, Toradol 15mg IV push, and electrolyte replacement.  EKG shows junctional rhythm in the 50s.  No acute signs of ischemia.    Home cardiology meds include atorvastatin 80 mg daily, metoprolol tartrate 50 mg twice -patient was on losartan/HCTZ 100/25 mg daily-> " not sure when that was dropped from her medication list.    Past Cardiology Tests (Last 3 Years):  CAD with PCI and stent deployment to high-grade LCX stenosis 2003.     Pharmacological nuclear stress test on 2014 which was negative for evidence of both ischemia and infarction     Repeat pharmacological nuclear stress test on 2017 which was likewise negative for any perfusion abnormalities.   Past Medical History:  She has a past medical history of Bilateral knee pain, Bilateral shoulder pain, Hematoma (2024), Personal history of other diseases of the circulatory system (2013), Personal history of other mental and behavioral disorders (2013), Pure hypercholesterolemia, unspecified (2013), and Thyroid condition.    Past Surgical History:  She has a past surgical history that includes Other surgical history (10/22/2019); Other surgical history (10/22/2019); and Gallbladder surgery (2016).      Social History:  She reports that she has never smoked. She has never used smokeless tobacco. She reports current alcohol use. She reports that she does not use drugs.    Family History:  No family history on file.     Allergies:  Patient has no known allergies.    ROS:  10 point review of systems including (Constitutional, Eyes, ENMT, Respiratory, Cardiac, Gastrointestinal, Neurological, Psychiatric, and Hematologic) was performed and is otherwise negative.    Objective Data:  Last Recorded Vitals:  Vitals:    24 2305 24 0027 24 0046 24 0300   BP: 156/73 149/85 154/60 143/70   BP Location:       Patient Position:       Pulse: 62  58 67   Resp: 18  16 15   Temp:   36.4 °C (97.5 °F) 36 °C (96.8 °F)   TempSrc:       SpO2: 94%  93% 93%   Weight:       Height:         Medical Gas Therapy: None (Room air)  Weight  Av.7 kg (200 lb)  Min: 90.7 kg (200 lb)  Max: 90.7 kg (200 lb)      LABS:  CMP:  Results from last 7 days   Lab Units 24  0438 24  1800  "  SODIUM mmol/L 139 139   POTASSIUM mmol/L 3.8 3.3*   CHLORIDE mmol/L 103 100   CO2 mmol/L 24 28   ANION GAP mmol/L 16 14   BUN mg/dL 17 19   CREATININE mg/dL 0.67 0.72   EGFR mL/min/1.73m*2 90 86   MAGNESIUM mg/dL 1.70 1.80   ALBUMIN g/dL  --  3.8   ALT U/L  --  15   AST U/L  --  17   BILIRUBIN TOTAL mg/dL  --  0.6     CBC:  Results from last 7 days   Lab Units 08/13/24  0438 08/12/24  1800   WBC AUTO x10*3/uL 8.9 7.9   HEMOGLOBIN g/dL 13.8 13.7   HEMATOCRIT % 41.5 41.6   PLATELETS AUTO x10*3/uL 303 310   MCV fL 99 99     COAG:     ABO: No results found for: \"ABO\"  HEME/ENDO:  Results from last 7 days   Lab Units 08/12/24 2005   TSH mIU/L 1.65      CARDIAC:   Results from last 7 days   Lab Units 08/12/24 2005 08/12/24  1800   TROPHS ng/L 9 7   BNP pg/mL  --  120*             Last I/O:    Intake/Output Summary (Last 24 hours) at 8/13/2024 0836  Last data filed at 8/13/2024 0252  Gross per 24 hour   Intake 100 ml   Output 900 ml   Net -800 ml     Net IO Since Admission: -800 mL [08/13/24 0836]      Imaging Results:  ECG 12 lead    Result Date: 8/13/2024  Junctional rhythm Minimal voltage criteria for LVH, may be normal variant ( R in aVL ) Inferior-posterior infarct (cited on or before 15-APR-2024) Abnormal ECG When compared with ECG of 15-APR-2024 20:43, Junctional rhythm has replaced Electronic atrial pacemaker QRS duration has increased    Vascular US upper extremity venous duplex left    Result Date: 8/12/2024  Interpreted By:  Flako Malone, STUDY: [facility] US UPPER EXTREMITY VENOUS DUPLEX LEFT  8/12/2024 7:15 pm   INDICATION: 77 y/o   F with  Signs/Symptoms:pain and swelling.   COMPARISON: None.   ACCESSION NUMBER(S): [id number]   ORDERING CLINICIAN: RADHA ULLOA   TECHNIQUE: Routine ultrasound of the  left upper extremity was performed with duplex Doppler (color and spectral) evaluation.   Static images were obtained for remote interpretation.   FINDINGS: UPPER EXTREMITY VEINS:  Examination was " performed with color and duplex Doppler.   The internal jugular, subclavian, and axillary veins are patent and free of thrombus.  The visualized brachiocephalic veins are patent. The brachial, basilic, and cephalic veins are patent and compressible.       Negative study.  No thrombus in the central veins of the  left upper extremities.   MACRO: None   Signed by: Flako Malone 8/12/2024 7:16 PM Dictation workstation:   [id number]    Vascular US Lower Extremity Venous Duplex Bilateral    Result Date: 8/12/2024  Interpreted By:  Flako Malone, STUDY: [facility] US LOWER EXTREMITY VENOUS DUPLEX BILATERAL  8/12/2024 6:15 pm   INDICATION: 77 y/o   F with  Signs/Symptoms:pain and swelling. LMP:  Unknown.   COMPARISON: None.   ACCESSION NUMBER(S): [id number]   ORDERING CLINICIAN: RADHA ULLOA   TECHNIQUE: Routine ultrasound of the  bilateral lower extremity was performed with duplex Doppler (color and spectral) evaluation.   Static images were obtained for remote interpretation.   FINDINGS: THIGH VEINS:  The common femoral, femoral, popliteal, proximal medial saphenous, and deep femoral veins are patent and free of thrombus. The veins are normally compressible.  They demonstrate normal phasic flow and augmentation response.   CALF VEINS:  The paired peroneal and posterior tibial calf veins are patent. Bilateral Baker's cysts detected. The cyst on the right measures about 3.3 cm. The cyst on the left measures 4.6 cm.   Rouleaux flow seen within the legs suggesting some component of decreased cardiac output       Negative study.  No deep venous thrombosis of the  bilateral lower extremity.   Bilateral Baker's cysts.   Features suggesting decreased cardiac output with rouleaux slow flow   MACRO: None   Signed by: Flako Malone 8/12/2024 7:15 PM Dictation workstation:   [id number]    XR chest 2 views    Result Date: 8/12/2024  Interpreted By:  Flako Malone, STUDY: XR CHEST 2 VIEWS;  8/12/2024 7:02 pm    INDICATION: Signs/Symptoms:l arm pain.   COMPARISON: 04/07/2024   ACCESSION NUMBER(S): [id number]   ORDERING CLINICIAN: RADHA ULLOA   FINDINGS: Heart size is enlarged. Vascular calcification. No developing infiltrate effusion or pneumothorax. Equivocal early volume overload noted. Correlation with volume status demineralization of the bones       1.  Equivocal volume overload.       MACRO: None   Signed by: Flako Malone 8/12/2024 7:14 PM Dictation workstation:   [id number]      Inpatient Medications:  Scheduled medications   Medication Dose Route Frequency    atorvastatin  80 mg oral Nightly    furosemide  40 mg intravenous BID    levothyroxine  88 mcg oral Daily before breakfast    metoprolol tartrate  50 mg oral BID    pantoprazole  40 mg oral Daily before breakfast    PARoxetine  20 mg oral Daily    polyethylene glycol  17 g oral Daily     PRN medications   Medication    acetaminophen    oxygen     Continuous Medications   Medication Dose Last Rate       Outpatient Medications:  Current Outpatient Medications   Medication Instructions    acetaminophen (Tylenol) 325 mg tablet Take 2 by mouth every 6 hours for 5 days.    aspirin 81 mg, oral, Daily    atorvastatin (LIPITOR) 80 mg, oral, Nightly    cholecalciferol (VITAMIN D3) 50 mcg, oral, Daily    levothyroxine (SYNTHROID, LEVOXYL) 88 mcg, oral, Daily before breakfast    metoprolol succinate XL (TOPROL-XL) 100 mg, oral, Daily, Do not crush or chew.    metoprolol tartrate (LOPRESSOR) 50 mg, oral, 2 times daily    PARoxetine (PAXIL) 20 mg, oral, Daily       Physical Exam:  General:  Patient is awake, alert, and oriented.  Patient is in no acute distress.  HEENT:  Pupils equal and reactive.  Normocephalic.  Moist mucosa.    Neck:  No thyromegaly.  Normal Jugular Venous Pressure.  Cardiovascular:  Regular rate and rhythm.  Normal S1 and S2.  Pulmonary:  Clear to auscultation bilaterally.  Abdomen:  Soft. Non-tender.   Non-distended.  Positive  "bowel sounds.  Lower Extremities:  2+ pedal pulses. No LE edema.  Neurologic:  Cranial nerves intact.  No focal deficit.   Skin: Skin warm and dry, normal skin turgor.   Psychiatric: Normal affect.     Assessment/Plan   Cards:Conner Duggan is a 78 y.o. female with past medical history significant for CAD, HTN, HLD, Hypothyroidism, Vitamin D deficiency, Obesity, GERD, OA knees, shoulders, hips, DKD,  traumatic subdural hematoma, c/b seizure, anxiety presents to [facility] ED with bilateral upper and lower extremity swelling along with difficulty ambulating and WARNER.  Cardiology is consulted for \"fluid overload, concern for new onset CHF\"    Home cardiology meds include atorvastatin 80 mg daily, metoprolol tartrate 50 mg twice daily     # Rule out acute heart failure-admit , chest x-ray with slight congestion, hypervolemic on exam  # Hypertension  # Hyperlipidemia  # History of CAD s/p stent high-grade LCX stenosis 06/16/2003.    RECS:  - We will obtain a transthoracic echocardiogram for structural evaluation including ejection fraction, assessment of regional wall motion abnormalities or valvular disease, and further evaluation of hemodynamics.    -Continue with IV Lasix 40 mg twice daily  -Re-start ASA 81mg daily   -Will add Farxiga 10mg daily   -Restart and discharge on Losartan/HCTZ 100/25 mg daily  -Can follow up with outpatient cardiologist Conner       Code Status:  Full Code  I spent 40 minutes in the professional and overall care of this patient.        Joana Dunham, APRN-CNP   "

## 2024-08-14 VITALS
TEMPERATURE: 96.4 F | HEART RATE: 61 BPM | DIASTOLIC BLOOD PRESSURE: 61 MMHG | HEIGHT: 63 IN | BODY MASS INDEX: 35.44 KG/M2 | WEIGHT: 200 LBS | SYSTOLIC BLOOD PRESSURE: 111 MMHG | RESPIRATION RATE: 16 BRPM | OXYGEN SATURATION: 95 %

## 2024-08-14 LAB
ANION GAP SERPL CALC-SCNC: 13 MMOL/L (ref 10–20)
BUN SERPL-MCNC: 29 MG/DL (ref 6–23)
CALCIUM SERPL-MCNC: 8.7 MG/DL (ref 8.6–10.3)
CHLORIDE SERPL-SCNC: 100 MMOL/L (ref 98–107)
CO2 SERPL-SCNC: 28 MMOL/L (ref 21–32)
CREAT SERPL-MCNC: 0.98 MG/DL (ref 0.5–1.05)
EGFRCR SERPLBLD CKD-EPI 2021: 59 ML/MIN/1.73M*2
ERYTHROCYTE [DISTWIDTH] IN BLOOD BY AUTOMATED COUNT: 12.1 % (ref 11.5–14.5)
GLUCOSE SERPL-MCNC: 132 MG/DL (ref 74–99)
HCT VFR BLD AUTO: 43.1 % (ref 36–46)
HGB BLD-MCNC: 14.4 G/DL (ref 12–16)
MAGNESIUM SERPL-MCNC: 2 MG/DL (ref 1.6–2.4)
MCH RBC QN AUTO: 33.4 PG (ref 26–34)
MCHC RBC AUTO-ENTMCNC: 33.4 G/DL (ref 32–36)
MCV RBC AUTO: 100 FL (ref 80–100)
NRBC BLD-RTO: 0 /100 WBCS (ref 0–0)
PLATELET # BLD AUTO: 307 X10*3/UL (ref 150–450)
POTASSIUM SERPL-SCNC: 3.6 MMOL/L (ref 3.5–5.3)
RBC # BLD AUTO: 4.31 X10*6/UL (ref 4–5.2)
SODIUM SERPL-SCNC: 137 MMOL/L (ref 136–145)
WBC # BLD AUTO: 8.7 X10*3/UL (ref 4.4–11.3)

## 2024-08-14 PROCEDURE — 2500000001 HC RX 250 WO HCPCS SELF ADMINISTERED DRUGS (ALT 637 FOR MEDICARE OP): Performed by: NURSE PRACTITIONER

## 2024-08-14 PROCEDURE — 2500000004 HC RX 250 GENERAL PHARMACY W/ HCPCS (ALT 636 FOR OP/ED): Performed by: NURSE PRACTITIONER

## 2024-08-14 PROCEDURE — 97161 PT EVAL LOW COMPLEX 20 MIN: CPT | Mod: GP

## 2024-08-14 PROCEDURE — G0378 HOSPITAL OBSERVATION PER HR: HCPCS

## 2024-08-14 PROCEDURE — 96376 TX/PRO/DX INJ SAME DRUG ADON: CPT

## 2024-08-14 PROCEDURE — 2500000002 HC RX 250 W HCPCS SELF ADMINISTERED DRUGS (ALT 637 FOR MEDICARE OP, ALT 636 FOR OP/ED): Performed by: NURSE PRACTITIONER

## 2024-08-14 PROCEDURE — 2500000002 HC RX 250 W HCPCS SELF ADMINISTERED DRUGS (ALT 637 FOR MEDICARE OP, ALT 636 FOR OP/ED)

## 2024-08-14 PROCEDURE — 85027 COMPLETE CBC AUTOMATED: CPT

## 2024-08-14 PROCEDURE — 97165 OT EVAL LOW COMPLEX 30 MIN: CPT | Mod: GO

## 2024-08-14 PROCEDURE — 83735 ASSAY OF MAGNESIUM: CPT

## 2024-08-14 PROCEDURE — 80048 BASIC METABOLIC PNL TOTAL CA: CPT

## 2024-08-14 PROCEDURE — 36415 COLL VENOUS BLD VENIPUNCTURE: CPT

## 2024-08-14 RX ORDER — POTASSIUM CHLORIDE 20 MEQ/1
40 TABLET, EXTENDED RELEASE ORAL ONCE
Status: COMPLETED | OUTPATIENT
Start: 2024-08-14 | End: 2024-08-14

## 2024-08-14 RX ORDER — DAPAGLIFLOZIN 10 MG/1
10 TABLET, FILM COATED ORAL DAILY
Qty: 30 TABLET | Refills: 0 | Status: SHIPPED | OUTPATIENT
Start: 2024-08-15 | End: 2024-08-14

## 2024-08-14 RX ORDER — DAPAGLIFLOZIN 10 MG/1
10 TABLET, FILM COATED ORAL DAILY
Qty: 30 TABLET | Refills: 0 | Status: SHIPPED | OUTPATIENT
Start: 2024-08-15 | End: 2024-09-14

## 2024-08-14 RX ORDER — LOSARTAN POTASSIUM AND HYDROCHLOROTHIAZIDE 12.5; 5 MG/1; MG/1
1 TABLET ORAL DAILY
Qty: 30 TABLET | Refills: 0 | Status: SHIPPED | OUTPATIENT
Start: 2024-08-14 | End: 2024-09-13

## 2024-08-14 RX ORDER — DAPAGLIFLOZIN 10 MG/1
10 TABLET, FILM COATED ORAL DAILY
Qty: 100 TABLET | Refills: 0 | Status: SHIPPED | OUTPATIENT
Start: 2024-08-15 | End: 2024-08-14

## 2024-08-14 ASSESSMENT — PAIN SCALES - GENERAL
PAINLEVEL_OUTOF10: 1
PAINLEVEL_OUTOF10: 1
PAINLEVEL_OUTOF10: 0 - NO PAIN
PAINLEVEL_OUTOF10: 1

## 2024-08-14 ASSESSMENT — COGNITIVE AND FUNCTIONAL STATUS - GENERAL
MOVING TO AND FROM BED TO CHAIR: A LITTLE
HELP NEEDED FOR BATHING: A LITTLE
MOBILITY SCORE: 21
DRESSING REGULAR UPPER BODY CLOTHING: A LITTLE
WALKING IN HOSPITAL ROOM: A LITTLE
DRESSING REGULAR LOWER BODY CLOTHING: A LITTLE
DAILY ACTIVITIY SCORE: 20
HELP NEEDED FOR BATHING: A LITTLE
WALKING IN HOSPITAL ROOM: A LITTLE
TOILETING: A LITTLE
DAILY ACTIVITIY SCORE: 21
CLIMB 3 TO 5 STEPS WITH RAILING: A LITTLE
TOILETING: A LITTLE
MOBILITY SCORE: 21
MOVING TO AND FROM BED TO CHAIR: A LITTLE
CLIMB 3 TO 5 STEPS WITH RAILING: A LITTLE
DRESSING REGULAR LOWER BODY CLOTHING: A LITTLE

## 2024-08-14 ASSESSMENT — PAIN - FUNCTIONAL ASSESSMENT
PAIN_FUNCTIONAL_ASSESSMENT: 0-10

## 2024-08-14 ASSESSMENT — ACTIVITIES OF DAILY LIVING (ADL)
ADL_ASSISTANCE: NEEDS ASSISTANCE
ADL_ASSISTANCE: INDEPENDENT

## 2024-08-14 NOTE — PROGRESS NOTES
Marybeth Duggan is a 78 y.o. female on day 0 of admission presenting with Lower extremity edema.    Spoke with patient's daughter over the phone to discuss DC planning. She states patient is independent with walker at baseline at home and she does not feel that home care would be beneficial. There are only stairs to get out of her side door and she just uses that when going to the store with her daughters. Daughters help with laundry and everything else patient needs. They do not want home care at this time. Will plan to follow up with PCP if anything changes as far as needs. No further needs identified at this time.     08/14/24 1013   Discharge Planning   Expected Discharge Disposition Home       Manuela Morales RN

## 2024-08-14 NOTE — DISCHARGE SUMMARY
Discharge Diagnosis  New Onset Heart Failure    Issues Requiring Follow-Up  New Onset Heart Failure      Discharge Meds     Your medication list        START taking these medications        Instructions Last Dose Given Next Dose Due   dapagliflozin propanediol 10 mg  Commonly known as: Farxiga  Start taking on: August 15, 2024      Take 1 tablet (10 mg) by mouth once daily.       losartan-hydrochlorothiazide 50-12.5 mg tablet  Commonly known as: Hyzaar      Take 1 tablet by mouth once daily.              CONTINUE taking these medications        Instructions Last Dose Given Next Dose Due   aspirin 81 mg EC tablet           atorvastatin 80 mg tablet  Commonly known as: Lipitor      TAKE 1 TABLET BY MOUTH AT  BEDTIME       levothyroxine 88 mcg tablet  Commonly known as: Synthroid, Levoxyl      Take 1 tablet (88 mcg) by mouth once daily in the morning. Take before meals.       metoprolol succinate  mg 24 hr tablet  Commonly known as: Toprol-XL           PARoxetine 20 mg tablet  Commonly known as: Paxil      Take 1 tablet (20 mg) by mouth once daily.       Vitamin D3 50 MCG (2000 UT) tablet  Generic drug: cholecalciferol                  ASK your doctor about these medications        Instructions Last Dose Given Next Dose Due   metoprolol tartrate 50 mg tablet  Commonly known as: Lopressor      Take 1 tablet by mouth 2 times a day.                 Where to Get Your Medications        These medications were sent to [facility] - [address] [address]      Phone: [phone]   losartan-hydrochlorothiazide 50-12.5 mg tablet       These medications were sent to [facility] [address]  [address] [address]      Phone: [phone]   dapagliflozin propanediol 10 mg         Test Results Pending At Discharge  Pending Labs       No current pending labs.            Hospital Course   New  onset HF  -cardiology signed off, start Farxiga, discharge on losartan/hydrochlorothiazide 100/25 daily  -Echo shows EF: 70-75%, indeterminate left ventricular filling  -Monitor DW, strict I/Os, renal function/lytes  -referral sent for outpatient cardiology follow-up with Dr. Prieto      OA of bilateral knees  Inability to ambulate  -prn analgesics  -fall precautions    -pt/ot rec low, patient's family declined home care orders    HTN  -Elevated BP on admission  -Resume home med losartan/hydrochlorothiazide and follow-up with cardiology, PCP  -orders and referrals placed     HLD  -Statin, ASA     CAD  -Statin     Prediabetes  -A1c 6%  -Blood glucose 116, stabilizing  -follow-up outpatient with PCP     Hypothyroidism  -TSH WNL  -resume synthroid     GERD  -resume PPI       Discharge planning  -Family declined home care orders for PT/OT, patient discharged home    Pertinent Physical Exam At Time of Discharge  Physical Exam  Constitutional: NAD  Eyes: no icterus   ENMT: mucous membranes moist  Head/Neck: supple  Resp/thorax: bibasilar crackles  C/V: RRR, no murmurs  : no Lawson  GI: S/ND/NT, + BS  M/S: no joint swelling  Extremities: 1+ BLE edema  Neurological: non-focal  Skin: Warm and dry    Outpatient Follow-Up  Future Appointments   Date Time Provider Department [place]   9/11/2024  4:15 PM Harrison Prieto MD [id number] [facility]   9/12/2024  3:40 PM Ashwin Garay MD [id number] [facility]         Khoi Hugo PA-C

## 2024-08-14 NOTE — CARE PLAN
The patient's goals for the shift include  remain safe     The clinical goals for the shift include pt will have decreased wheezing throughout shift      Problem: Pain - Adult  Goal: Verbalizes/displays adequate comfort level or baseline comfort level  Outcome: Progressing     Problem: Safety - Adult  Goal: Free from fall injury  Outcome: Progressing     Problem: Discharge Planning  Goal: Discharge to home or other facility with appropriate resources  Outcome: Progressing     Problem: Chronic Conditions and Co-morbidities  Goal: Patient's chronic conditions and co-morbidity symptoms are monitored and maintained or improved  Outcome: Progressing     Problem: Fall/Injury  Goal: Not fall by end of shift  Outcome: Progressing  Goal: Be free from injury by end of the shift  Outcome: Progressing  Goal: Verbalize understanding of personal risk factors for fall in the hospital  Outcome: Progressing  Goal: Verbalize understanding of risk factor reduction measures to prevent injury from fall in the home  Outcome: Progressing  Goal: Use assistive devices by end of the shift  Outcome: Progressing  Goal: Pace activities to prevent fatigue by end of the shift  Outcome: Progressing

## 2024-08-14 NOTE — PROGRESS NOTES
Physical Therapy    Physical Therapy Evaluation    Patient Name: Marybeth Duggan  MRN: [id number]  Today's Date: 8/14/2024   Time Calculation  Start Time: 0944  Stop Time: 0957  Time Calculation (min): 13 min    Assessment/Plan   PT Assessment  Barriers to Discharge: none for PT  Evaluation/Treatment Tolerance: Patient tolerated treatment well  Medical Staff Made Aware: Yes  End of Session Communication: Bedside nurse, Care Coordinator  Assessment Comment: PT eval completed, and pt did not require any hands-on assist. She was able to amb with a ww with SBA, and transfer with mod indep. Pt does not qualify for skilled PT interventions.  End of Session Patient Position: Bed, 3 rail up, Alarm on  IP OR SWING BED PT PLAN  Inpatient or Swing Bed: Inpatient  PT Plan  PT Plan: PT Eval only  PT Eval Only Reason: Safe to return home  PT Frequency: PT eval only  PT Discharge Recommendations: No PT needed after discharge (Dtr declining HPT)  PT Recommended Transfer Status: Stand by assist, Assistive device  PT - OK to Discharge: Yes      Subjective   General Visit Information:  General  Reason for Referral: Pt admitted from home with BLE edema and pain, making ambulation difficult.  Past Medical History Relevant to Rehab:   Past Medical History:   Diagnosis Date    Bilateral knee pain     Bilateral shoulder pain     Hematoma 04/07/2024    subdural    Personal history of other diseases of the circulatory system 08/08/2013    History of hypertension    Personal history of other mental and behavioral disorders 08/08/2013    History of depression    Pure hypercholesterolemia, unspecified 09/19/2013    Low-density-lipoid-type (LDL) hyperlipoproteinemia    Thyroid condition      Past Surgical History:   Procedure Laterality Date    GALLBLADDER SURGERY  11/30/2016    Gallbladder Surgery    OTHER SURGICAL HISTORY  10/22/2019    Hip replacement    OTHER SURGICAL HISTORY  10/22/2019    Thyroidectomy total       Family/Caregiver Present:  No  Co-Treatment: OT  Co-Treatment Reason: to maximize pt participation due to language barrier  Prior to Session Communication: Bedside nurse  Patient Position Received: Bed, 3 rail up, Alarm on  Preferred Learning Style: kinesthetic, auditory (Pt able to understand some English.)  General Comment: Pt is pleasant, able to answer basic questions in English. Hew BLE edema is improved, and she is able to amb with a ww today.  Home Living:  Home Living  Type of Home: House  Lives With:  (dtr)  Home Adaptive Equipment: Walker rolling or standard  Home Living Comments: Pt unable to provide details, but [facility] reports that dtr and family are with pt all the time.  Prior Level of Function:  Prior Function Per Pt/Caregiver Report  Receives Help From: Family (dtr)  ADL Assistance: Independent  Ambulatory Assistance: Independent (with ww)  Precautions:  Precautions  Medical Precautions: Fall precautions  Precautions Comment: Zay    Objective   Pain:  Pain Assessment  Pain Assessment: 0-10  0-10 (Numeric) Pain Score: 1  Pain Type: Acute pain  Pain Location: Leg  Pain Orientation: Right, Left  Cognition:  Cognition  Overall Cognitive Status:  (Unable to assess due to language barrier, but pt seems appropriate and aware.)  Impulsive: Within functional limits    General Assessments:   Activity Tolerance  Endurance: Endurance does not limit participation in activity  Activity Tolerance Comments: Some wheezin noted initially when seated EOB, but none present during ambulation or at end of session.    Perception  Inattention/Neglect: Appears intact  Initiation: Appears intact  Motor Planning: Appears intact    Coordination  Movements are Fluid and Coordinated: Yes    Static Sitting Balance  Static Sitting-Balance Support: Feet supported, No upper extremity supported  Static Sitting-Level of Assistance: Independent  Dynamic Sitting Balance  Dynamic Sitting-Balance Support: Bilateral upper extremity supported, Feet  unsupported  Dynamic Sitting-Level of Assistance: Independent  Dynamic Sitting-Balance: Trunk control activities    Static Standing Balance  Static Standing-Balance Support: Bilateral upper extremity supported  Static Standing-Level of Assistance: Distant supervision  Dynamic Standing Balance  Dynamic Standing-Balance Support: Bilateral upper extremity supported  Dynamic Standing-Level of Assistance: Distant supervision  Dynamic Standing-Balance: Turning  Functional Assessments:  Bed Mobility  Bed Mobility: Yes  Bed Mobility 1  Bed Mobility 1: Supine to sitting, Sitting to supine  Level of Assistance 1: Modified independent  Bed Mobility Comments 1: HOB 30    Transfers  Transfer: Yes  Transfer 1  Technique 1: Sit to stand, Stand to sit  Transfer Device 1: Walker  Transfer Level of Assistance 1: Modified independent    Ambulation/Gait Training  Ambulation/Gait Training Performed: Yes  Ambulation/Gait Training 1  Surface 1: Level tile  Device 1: Rolling walker  Assistance 1: Distant supervision  Quality of Gait 1: Antalgic (decreased SLS time on LLE vs RLE, no LOB or difficulty maneuvering ww around objects or turning)  Comments/Distance (ft) 1: 80'    Stairs  Stairs: No (Unsure if pt has stairs to negotiate at home, but anticipate pt is at or close to baseline with this.)  Extremity/Trunk Assessments:  RLE   RLE : Within Functional Limits  LLE   LLE : Within Functional Limits  Outcome Measures:  [facility] Basic Mobility  Turning from your back to your side while in a flat bed without using bedrails: None  Moving from lying on your back to sitting on the side of a flat bed without using bedrails: None  Moving to and from bed to chair (including a wheelchair): A little  Standing up from a chair using your arms (e.g. wheelchair or bedside chair): None  To walk in hospital room: A little  Climbing 3-5 steps with railing: A little  Basic Mobility - Total Score: 21      Education Documentation  Body Mechanics, taught by Yazmin ROBERTSON  Dejuan, PT at 8/14/2024 11:15 AM.  Learner: Patient  Readiness: Acceptance  Method: Explanation  Response: Demonstrated Understanding    Mobility Training, taught by Yazmin Zaidi PT at 8/14/2024 11:15 AM.  Learner: Patient  Readiness: Acceptance  Method: Explanation  Response: Demonstrated Understanding    Education Comments  No comments found.

## 2024-08-14 NOTE — PROGRESS NOTES
Marybeth Duggan is a 78 y.o. female on day 0 of admission presenting with Lower extremity edema.    Plan: Continues treatment for heart failure, receiving IV lasix, to have ECHO. PT/OT to evaluate andwill follow up with family with C vs SNF if needed.   Disposition: Home  Barrier: diuresis, therapy evals  ADOD:  1-2 days     08/14/24 0649   Discharge Planning   Expected Discharge Disposition Home       Manuela Morales RN

## 2024-08-14 NOTE — DISCHARGE INSTRUCTIONS
[facility] Observation Unit Discharge Instructions  You came to the hospital with heart failure and were admitted for observation and further care.   You were treated with IV diuretics.   A cardiology specialist saw you while admitted and helped manage your care.   Your discharge plan is to go home to recover.    Please see your primary care doctor in 1 week for follow-up.   An appointment has been requested for you but may you need to call your doctors office to schedule.    Additionally, a referral has been ordered for you to follow up with cardiology.   The office or scheduling department should call you to arrange an appointment in the next week or two.     For any issues or concerns with appointments, call the  scheduling line at [phone] or the providers office directly.       See the attached information for education about any new medications and the condition(s) you were treated for.  Your medications may have changed so pay close attention to the list given to you at discharge and ask any questions you have before leaving the hospital.

## 2024-08-14 NOTE — PROGRESS NOTES
Occupational Therapy    Evaluation    Patient Name: Marybeth Duggan  MRN: [id number]  Today's Date: 8/14/2024  Time Calculation  Start Time: 0945  Stop Time: 0957  Time Calculation (min): 12 min        Assessment:  OT Assessment: 77 yo presenting to be at/near baseline/ No additional acute OT needs indicated, will d/c from OT. Has prn assist from dtr once home and also declining HHC servces. Please re-consult as needed  Prognosis: Excellent  Barriers to Discharge: None  Medical Staff Made Aware: Yes  End of Session Communication: Bedside nurse, Care Coordinator  End of Session Patient Position: Bed, 3 rail up, Alarm on  Prognosis: Excellent  Barriers to Discharge: None  Medical Staff Made Aware: Yes  Plan:  No Skilled OT: No acute OT goals identified  OT Discharge Recommendations: No further acute OT, No OT needed after discharge  OT Recommended Transfer Status: Stand by assist  OT - OK to Discharge: Yes       Subjective   Current Problem:  1. Leg edema        2. Hypervolemia, unspecified hypervolemia type        3. Lower extremity edema  Transthoracic Echo (TTE) Complete    Transthoracic Echo (TTE) Complete      4. Acute heart failure with preserved ejection fraction (Multi)  dapagliflozin propanediol (Farxiga) 10 mg    Referral to Primary Care - Family Practice    Referral to Cardiology      5. Hypertension, unspecified type  losartan-hydrochlorothiazide (Hyzaar) 50-12.5 mg tablet        General:  General  Reason for Referral: decline in ADLs; Pt admitted from home with BLE edema and pain, making ambulation difficult. being tx for new onset CHF  Past Medical History Relevant to Rehab:   Past Medical History:   Diagnosis Date    Bilateral knee pain     Bilateral shoulder pain     Hematoma 04/07/2024    subdural    Personal history of other diseases of the circulatory system 08/08/2013    History of hypertension    Personal history of other mental and behavioral disorders 08/08/2013    History of depression    Pure  hypercholesterolemia, unspecified 09/19/2013    Low-density-lipoid-type (LDL) hyperlipoproteinemia    Thyroid condition      Family/Caregiver Present: No  Co-Treatment: PT  Co-Treatment Reason: to maximize pt participation due to language barrier  Prior to Session Communication: Bedside nurse  Patient Position Received: Bed, 3 rail up, Alarm on  Preferred Learning Style: kinesthetic, auditory (Pt able to understand some English.)  General Comment: Pt is pleasant, able to answer basic questions in English. Hew BLE edema is improved, and she is mobilize with supervision using ww  Precautions:  Medical Precautions: Fall precautions  Precautions Comment: Purewick    Pain:  Pain Assessment  Pain Assessment: 0-10  0-10 (Numeric) Pain Score: 1  Pain Type: Acute pain  Pain Location: Leg  Pain Orientation: Right  Pain Interventions: Repositioned    Objective   Cognition:  Arousal/Alertness: Appropriate responses to stimuli  Orientation Level:  (ox3)  Following Commands: Follows one step commands without difficulty  Attention: Within Functional Limits  Insight: Within function limits  Impulsive: Within functional limits      Home Living:  Type of Home: House  Lives With:  (dtr)  Home Adaptive Equipment: Walker rolling or standard  Bathroom Equipment: Grab bars in shower, Shower chair with back  Home Living Comments: difficulty with complete PLOF and home setup d/t language barrier  Prior Function:  Receives Help From: Family (dtr)  ADL Assistance: Needs assistance  Bath:  (per prior chart review-- pt dtr assists with dressing and bathing tasks)  Ambulatory Assistance: Independent (with ww)  IADL History:  IADL Comments: dtr assists with all IADLs  ADL:  Eating Assistance: Independent  Grooming Assistance: Stand by  UE Dressing Assistance: Independent  LE Dressing Deficit:  (anticipate inc assist for LE dressing (dtr assists) difficulty assessing d/t language barrier)  ADL Comments: simulated ability to manage bathroom  distance ambulation for toileting/ ADL needs  Activity Tolerance:  Endurance: Endurance does not limit participation in activity  Activity Tolerance Comments: wheezing after ambulation completed  Bed Mobility/Transfers: Bed Mobility  Bed Mobility: Yes  Bed Mobility 1  Bed Mobility 1: Supine to sitting, Sitting to supine, Long sit  Level of Assistance 1: Modified independent  Bed Mobility Comments 1: independent with bed mobility, sup to long sit and then EOB    Transfers  Transfer: Yes  Transfer 1  Technique 1: Sit to stand, Stand to sit  Transfer Device 1: Walker  Transfer Level of Assistance 1: Modified independent      Functional Mobility:  Functional Mobility  Functional Mobility Performed: Yes  Sitting Balance:  Static Sitting Balance  Static Sitting-Level of Assistance: Independent  Dynamic Sitting Balance  Dynamic Sitting-Level of Assistance: Distant supervision  Standing Balance:  Static Standing Balance  Static Standing-Level of Assistance: Close supervision  Dynamic Standing Balance  Dynamic Standing-Level of Assistance: Close supervision   Modalities:     Vision:Vision - Basic Assessment  Current Vision: No visual deficits   and    Sensation:  Light Touch: No apparent deficits  Strength:     Perception:  Inattention/Neglect: Appears intact  Initiation: Appears intact  Motor Planning: Appears intact  Perseveration: Not present  Coordination:  Movements are Fluid and Coordinated: Yes   Hand Function:  Gross Grasp: Functional  Coordination: Functional  Extremities: RUE   RUE : Within Functional Limits, LUE   LUE: Within Functional Limits, RLE   RLE : Within Functional Limits, and LLE   LLE : Within Functional Limits    Outcome Measures:  [facility] Daily Activity  Putting on and taking off regular lower body clothing: A little  Bathing (including washing, rinsing, drying): A little  Putting on and taking off regular upper body clothing: None  Toileting, which includes using toilet, bedpan or urinal: A  little  Taking care of personal grooming such as brushing teeth: None  Eating Meals: None  Daily Activity - Total Score: 21    Education Documentation  Body Mechanics, taught by Austin Bryant OT at 8/14/2024  1:16 PM.  Learner: Patient  Readiness: Acceptance  Method: Explanation  Response: Verbalizes Understanding    ADL Training, taught by Austin Bryant OT at 8/14/2024  1:16 PM.  Learner: Patient  Readiness: Acceptance  Method: Explanation  Response: Verbalizes Understanding    Education Comments  No comments found.

## 2024-08-14 NOTE — CARE PLAN
Problem: Pain - Adult  Goal: Verbalizes/displays adequate comfort level or baseline comfort level  Outcome: Progressing     Problem: Safety - Adult  Goal: Free from fall injury  Outcome: Progressing     Problem: Discharge Planning  Goal: Discharge to home or other facility with appropriate resources  Outcome: Progressing     Problem: Chronic Conditions and Co-morbidities  Goal: Patient's chronic conditions and co-morbidity symptoms are monitored and maintained or improved  Outcome: Progressing     Problem: Fall/Injury  Goal: Not fall by end of shift  Outcome: Progressing  Goal: Be free from injury by end of the shift  Outcome: Progressing  Goal: Verbalize understanding of personal risk factors for fall in the hospital  Outcome: Progressing  Goal: Verbalize understanding of risk factor reduction measures to prevent injury from fall in the home  Outcome: Progressing  Goal: Use assistive devices by end of the shift  Outcome: Progressing  Goal: Pace activities to prevent fatigue by end of the shift  Outcome: Progressing   The patient's goals for the shift include      The clinical goals for the shift include will have no shortness of breath through out the shift

## 2024-08-15 ENCOUNTER — PATIENT OUTREACH (OUTPATIENT)
Dept: PRIMARY CARE | Facility: CLINIC | Age: 79
End: 2024-08-15
Payer: MEDICARE

## 2024-08-15 DIAGNOSIS — E03.9 HYPOTHYROIDISM, UNSPECIFIED TYPE: ICD-10-CM

## 2024-08-15 NOTE — PROGRESS NOTES
Discharge Facility: [facility]     Discharge Diagnosis:    New Onset Heart Failure     Issues Requiring Follow-Up  New Onset Heart Failure    Admission Date: 8/13/2024   Discharge Date:  8/14/2024     PCP Appointment Date: 9/12/2024 tasked to office for sooner F/U with in 2 weeks by 8/27/2024 if possible     Specialist Appointment Date:     DR. [facility]  9/11/2024     Hospital Encounter and Summary Linked: Yes    See discharge assessment below for further details     Engagement  Call Start Time: 1434 (8/15/2024  2:34 PM)    Medications  Medications reviewed with patient/caregiver?: Yes (8/15/2024  2:34 PM)  Is the patient having any side effects they believe may be caused by any medication additions or changes?: No (8/15/2024  2:34 PM)  Does the patient have all medications ordered at discharge?: No (8/15/2024  2:34 PM)  What is keeping the patient from filling the prescriptions?: -- (FARXIGA sent to mail order, told Hospital to send local , DTR will call this nurse if not at pharmacy or in [facility]) (8/15/2024  2:34 PM)  Prescription Comments: NEW reviewed ,START taking these medications         Instructions  Last Dose Given  Next Dose Due  dapagliflozin propanediol 10 mg  Commonly known as: Farxiga  Start taking on: August 15, 2024        Take 1 tablet (10 mg) by mouth once daily.           losartan-hydrochlorothiazide 50-12.5 mg tablet  Commonly known as: Hyzaar        Take 1 tablet by mouth once daily. (8/15/2024  2:34 PM)  Is the patient taking all medications as directed (includes completed medication regime)?: -- (DTR states has all other meds) (8/15/2024  2:34 PM)  Medication Comments: DTR has no oquestions or concerns (8/15/2024  2:34 PM)    Appointments  Does the patient have a primary care provider?: Yes (8/15/2024  2:34 PM)  Care Management Interventions: Educated patient on importance of making appointment (8/15/2024  2:34 PM)  Has the patient kept scheduled appointments due by today?: Yes  (8/15/2024  2:34 PM)  Care Management Interventions: Advised patient to keep appointment (8/15/2024  2:34 PM)    Self Management  What is the home health agency?: NA refused (8/15/2024  2:34 PM)  What Durable Medical Equipment (DME) was ordered?: NA (8/15/2024  2:34 PM)    Patient Teaching  Does the patient have access to their discharge instructions?: Yes (8/15/2024  2:34 PM)  Care Management Interventions: Reviewed instructions with patient (8/15/2024  2:34 PM)  What is the patient's perception of their health status since discharge?: Improving (8/15/2024  2:34 PM)  Is the patient/caregiver able to teach back the hierarchy of who to call/visit for symptoms/problems? PCP, Specialist, Home Health nurse, Urgent Care, ED, 911: Yes (8/15/2024  2:34 PM)  Patient/Caregiver Education Comments: Speaking to DTR on phone states family has provided education on obtaing weight daily after use B.R in am before eating or Drinking  , aware to chart and call providers for weight gain 2+lbs in a day 0r 5+lbs in a week. DTR will call this nurse if the Farxiga medication  is not at the local pharmacy [facility] , as hosiptal sent to mail away DTR requested sent to local pharm. I sent message to PCP office as 9/12 F/U is not with in the 2 week window as DTR states Hosital set up appt.  I will F/U (8/15/2024  2:34 PM)

## 2024-08-19 ENCOUNTER — APPOINTMENT (OUTPATIENT)
Dept: ORTHOPEDIC SURGERY | Facility: CLINIC | Age: 79
End: 2024-08-19
Payer: MEDICARE

## 2024-08-19 RX ORDER — LEVOTHYROXINE SODIUM 88 UG/1
88 TABLET ORAL
Qty: 90 TABLET | Refills: 0 | Status: SHIPPED | OUTPATIENT
Start: 2024-08-19

## 2024-08-19 NOTE — TELEPHONE ENCOUNTER
Last visit was 5/6/2024  Labs on 8/12 TSH-1.65 , discharged from the hospital on 8/14 with Edema    Apt on 9/12/24

## 2024-08-21 ENCOUNTER — PATIENT OUTREACH (OUTPATIENT)
Dept: PRIMARY CARE | Facility: CLINIC | Age: 79
End: 2024-08-21
Payer: MEDICARE

## 2024-08-21 NOTE — PROGRESS NOTES
Unable to reach patient /DTR for a F/U call , PCP appt still in place for 9/2024     [facility] with call back number for patient to call if needed   If no voicemail available call attempts x 2 were made to contact the patient to assist with any questions or concerns patient may have.     L/M  encouraged to call providers for any questions concerns or change in  condition

## 2024-08-22 DIAGNOSIS — G45.9 TRANSIENT ISCHEMIC ATTACK: Primary | ICD-10-CM

## 2024-09-10 ENCOUNTER — PATIENT OUTREACH (OUTPATIENT)
Dept: PRIMARY CARE | Facility: CLINIC | Age: 79
End: 2024-09-10
Payer: MEDICARE

## 2024-09-10 NOTE — PROGRESS NOTES
Call regarding  F/U, I spoke with family patients DTSAMI Madsen  states will see new PCP 10/1 2024 Dr. Jeanne Lyn , will see Dr. [facility] 9/11/2024 ,  pretty sure has enough medication. patient has been monitoring weight daily states same no change , aware when to call provider for 2+lb gain 24 hrs or 5+lbs in a week.        At time of outreach call the patient feels as if their condition has improved  since last visit.    Encouragement to call providers for any questions concerns or change in condition

## 2024-09-11 ENCOUNTER — OFFICE VISIT (OUTPATIENT)
Dept: CARDIOLOGY | Facility: CLINIC | Age: 79
End: 2024-09-11
Payer: MEDICARE

## 2024-09-11 VITALS
OXYGEN SATURATION: 95 % | HEIGHT: 63 IN | BODY MASS INDEX: 37.83 KG/M2 | HEART RATE: 60 BPM | DIASTOLIC BLOOD PRESSURE: 82 MMHG | SYSTOLIC BLOOD PRESSURE: 168 MMHG | WEIGHT: 213.5 LBS

## 2024-09-11 DIAGNOSIS — I25.10 ARTERIOSCLEROSIS OF CORONARY ARTERY: Primary | ICD-10-CM

## 2024-09-11 DIAGNOSIS — I50.31 ACUTE HEART FAILURE WITH PRESERVED EJECTION FRACTION: ICD-10-CM

## 2024-09-11 PROCEDURE — 3077F SYST BP >= 140 MM HG: CPT | Performed by: INTERNAL MEDICINE

## 2024-09-11 PROCEDURE — 3079F DIAST BP 80-89 MM HG: CPT | Performed by: INTERNAL MEDICINE

## 2024-09-11 PROCEDURE — 1159F MED LIST DOCD IN RCRD: CPT | Performed by: INTERNAL MEDICINE

## 2024-09-11 PROCEDURE — 99214 OFFICE O/P EST MOD 30 MIN: CPT | Performed by: INTERNAL MEDICINE

## 2024-09-11 PROCEDURE — 1160F RVW MEDS BY RX/DR IN RCRD: CPT | Performed by: INTERNAL MEDICINE

## 2024-09-11 RX ORDER — LOSARTAN POTASSIUM AND HYDROCHLOROTHIAZIDE 25; 100 MG/1; MG/1
1 TABLET ORAL DAILY
COMMUNITY

## 2024-09-11 RX ORDER — DAPAGLIFLOZIN 10 MG/1
10 TABLET, FILM COATED ORAL DAILY
Qty: 30 TABLET | Refills: 11 | Status: SHIPPED | OUTPATIENT
Start: 2024-09-11 | End: 2025-09-11

## 2024-09-11 ASSESSMENT — ENCOUNTER SYMPTOMS
DEPRESSION: 1
OCCASIONAL FEELINGS OF UNSTEADINESS: 1
LOSS OF SENSATION IN FEET: 0

## 2024-09-12 ENCOUNTER — APPOINTMENT (OUTPATIENT)
Dept: PRIMARY CARE | Facility: CLINIC | Age: 79
End: 2024-09-12
Payer: MEDICARE

## 2024-09-16 NOTE — PROGRESS NOTES
Primary Care Physician: No primary care provider on file.  Date of Visit: 09/11/2024  4:15 PM EDT  Location of visit: [facility]     Chief Complaint:   Chief Complaint   Patient presents with    Follow-up    Hospital Follow-up     HPI / Summary:   Marybeth Duggan is a 78 y.o. female presents for     ROS    Medical History:   She has a past medical history of Bilateral knee pain, Bilateral shoulder pain, Hematoma (04/07/2024), Personal history of other diseases of the circulatory system (08/08/2013), Personal history of other mental and behavioral disorders (08/08/2013), Pure hypercholesterolemia, unspecified (09/19/2013), and Thyroid condition.  Surgical Hx:   She has a past surgical history that includes Other surgical history (10/22/2019); Other surgical history (10/22/2019); and Gallbladder surgery (11/30/2016).   Social Hx:   She reports that she has never smoked. She has never used smokeless tobacco. She reports current alcohol use. She reports that she does not use drugs.  Family Hx:   Her family history is not on file.   Allergies:  No Known Allergies  Outpatient Medications:  Current Outpatient Medications   Medication Instructions    aspirin 81 mg, oral, Daily    atorvastatin (LIPITOR) 80 mg, oral, Nightly    cholecalciferol (VITAMIN D3) 50 mcg, oral, Daily    dapagliflozin propanediol (FARXIGA) 10 mg, oral, Daily    dapagliflozin propanediol (FARXIGA) 10 mg, oral, Daily    levothyroxine (SYNTHROID, LEVOXYL) 88 mcg, oral, Daily before breakfast    losartan-hydrochlorothiazide (Hyzaar) 100-25 mg tablet 1 tablet, oral, Daily    metoprolol succinate XL (TOPROL-XL) 100 mg, oral, Daily, Do not crush or chew.    metoprolol tartrate (LOPRESSOR) 50 mg, oral, 2 times daily    PARoxetine (PAXIL) 20 mg, oral, Daily     Physical Exam:  Vitals:    09/11/24 1634 09/11/24 1706   BP: 161/83 168/82   BP Location: Right arm    Patient Position: Sitting    BP Cuff Size: Large adult    Pulse: 64 60   SpO2: 95%   "  Weight: 96.8 kg (213 lb 8 oz)    Height: 1.6 m (5' 3\")      Wt Readings from Last 5 Encounters:   09/11/24 96.8 kg (213 lb 8 oz)   08/12/24 90.7 kg (200 lb)   05/29/24 93.9 kg (207 lb)   05/06/24 93.6 kg (206 lb 6.4 oz)   04/26/24 97.4 kg (214 lb 11.7 oz)     Physical Exam  JVP not elevated. Carotid impulses are 2+ without overlying bruit.   Chest exhibits fair to good air movement with completely clear breath sounds.   The cardiac rhythm is regular with no premature beats.   Normal S1 and S2. No gallop, murmur or rub, or click.   Abdomen is soft and benign without focal tenderness.   With no lower leg edema. The pedal pulses are intact.     Last Labs:  Admission on 08/12/2024, Discharged on 08/14/2024   Component Date Value    WBC 08/12/2024 7.9     nRBC 08/12/2024 0.0     RBC 08/12/2024 4.19     Hemoglobin 08/12/2024 13.7     Hematocrit 08/12/2024 41.6     MCV 08/12/2024 99     MCH 08/12/2024 32.7     MCHC 08/12/2024 32.9     RDW 08/12/2024 12.1     Platelets 08/12/2024 310     Neutrophils % 08/12/2024 62.0     Immature Granulocytes %,* 08/12/2024 0.8     Lymphocytes % 08/12/2024 29.2     Monocytes % 08/12/2024 5.6     Eosinophils % 08/12/2024 1.9     Basophils % 08/12/2024 0.5     Neutrophils Absolute 08/12/2024 4.89     Immature Granulocytes Ab* 08/12/2024 0.06     Lymphocytes Absolute 08/12/2024 2.30     Monocytes Absolute 08/12/2024 0.44     Eosinophils Absolute 08/12/2024 0.15     Basophils Absolute 08/12/2024 0.04     Glucose 08/12/2024 198 (H)     Sodium 08/12/2024 139     Potassium 08/12/2024 3.3 (L)     Chloride 08/12/2024 100     Bicarbonate 08/12/2024 28     Anion Gap 08/12/2024 14     Urea Nitrogen 08/12/2024 19     Creatinine 08/12/2024 0.72     eGFR 08/12/2024 86     Calcium 08/12/2024 9.0     Albumin 08/12/2024 3.8     Alkaline Phosphatase 08/12/2024 106     Total Protein 08/12/2024 7.2     AST 08/12/2024 17     Bilirubin, Total 08/12/2024 0.6     ALT 08/12/2024 15     Lactate 08/12/2024 1.5     " Magnesium 08/12/2024 1.80     Ventricular Rate 08/12/2024 58     QRS Duration 08/12/2024 94     QT Interval 08/12/2024 460     QTC Calculation(Bazett) 08/12/2024 451     R Axis 08/12/2024 5     T Laura 08/12/2024 17     QRS Count 08/12/2024 9     Q Onset 08/12/2024 225     P Onset 08/12/2024 149     P Offset 08/12/2024 206     T Offset 08/12/2024 455     QTC Fredericia 08/12/2024 454     Troponin I, High Sensiti* 08/12/2024 7     Troponin I, High Sensiti* 08/12/2024 9     BNP 08/12/2024 120 (H)     Thyroid Stimulating Horm* 08/12/2024 1.65     AV pk rk 08/13/2024 1.00     AV mn grad 08/13/2024 2.0     LVOT diam 08/13/2024 1.76     MV E/A ratio 08/13/2024 0.67     LA vol index A/L 08/13/2024 21.9     Tricuspid annular plane * 08/13/2024 1.7     LV EF 08/13/2024 73     RV free wall pk S' 08/13/2024 11.70     LVIDd 08/13/2024 4.46     Aortic Valve Area by Con* 08/13/2024 1.93     Aortic Valve Area by Con* 08/13/2024 2.26     AV pk grad 08/13/2024 4.0     LV A4C EF 08/13/2024 60.2     Hemoglobin A1C 08/12/2024 6.0 (H)     Estimated Average Glucose 08/12/2024 126     Magnesium 08/13/2024 1.70     WBC 08/13/2024 8.9     nRBC 08/13/2024 0.0     RBC 08/13/2024 4.18     Hemoglobin 08/13/2024 13.8     Hematocrit 08/13/2024 41.5     MCV 08/13/2024 99     MCH 08/13/2024 33.0     MCHC 08/13/2024 33.3     RDW 08/13/2024 11.9     Platelets 08/13/2024 303     Glucose 08/13/2024 116 (H)     Sodium 08/13/2024 139     Potassium 08/13/2024 3.8     Chloride 08/13/2024 103     Bicarbonate 08/13/2024 24     Anion Gap 08/13/2024 16     Urea Nitrogen 08/13/2024 17     Creatinine 08/13/2024 0.67     eGFR 08/13/2024 90     Calcium 08/13/2024 8.7     WBC 08/14/2024 8.7     nRBC 08/14/2024 0.0     RBC 08/14/2024 4.31     Hemoglobin 08/14/2024 14.4     Hematocrit 08/14/2024 43.1     MCV 08/14/2024 100     MCH 08/14/2024 33.4     MCHC 08/14/2024 33.4     RDW 08/14/2024 12.1     Platelets 08/14/2024 307     Glucose 08/14/2024 132 (H)     Sodium  08/14/2024 137     Potassium 08/14/2024 3.6     Chloride 08/14/2024 100     Bicarbonate 08/14/2024 28     Anion Gap 08/14/2024 13     Urea Nitrogen 08/14/2024 29 (H)     Creatinine 08/14/2024 0.98     eGFR 08/14/2024 59 (L)     Calcium 08/14/2024 8.7     Magnesium 08/14/2024 2.00    Office Visit on 05/31/2024   Component Date Value    POC Color, Urine 05/31/2024 Yellow     POC Appearance, Urine 05/31/2024 Cloudy (A)     POC Glucose, Urine 05/31/2024 NEGATIVE     POC Bilirubin, Urine 05/31/2024 NEGATIVE     POC Ketones, Urine 05/31/2024 TRACE (A)     POC Specific Gravity, Ur* 05/31/2024 >=1.030     POC Blood, Urine 05/31/2024 SMALL (1+) (A)     POC PH, Urine 05/31/2024 5.5     POC Protein, Urine 05/31/2024 NEGATIVE     POC Urobilinogen, Urine 05/31/2024 0.2     Poc Nitrite, Urine 05/31/2024 NEGATIVE     POC Leukocytes, Urine 05/31/2024 SMALL (1+) (A)     Urine Culture 05/31/2024 >100,000 Escherichia coli (A)    Admission on 04/17/2024, Discharged on 04/22/2024   Component Date Value    WBC 04/18/2024 9.9     nRBC 04/18/2024 0.0     RBC 04/18/2024 3.67 (L)     Hemoglobin 04/18/2024 11.8 (L)     Hematocrit 04/18/2024 33.7 (L)     MCV 04/18/2024 92     MCH 04/18/2024 32.2     MCHC 04/18/2024 35.0     RDW 04/18/2024 12.1     Platelets 04/18/2024 330     Neutrophils % 04/18/2024 65.1     Immature Granulocytes %,* 04/18/2024 0.4     Lymphocytes % 04/18/2024 24.8     Monocytes % 04/18/2024 6.9     Eosinophils % 04/18/2024 2.4     Basophils % 04/18/2024 0.4     Neutrophils Absolute 04/18/2024 6.46 (H)     Immature Granulocytes Ab* 04/18/2024 0.04     Lymphocytes Absolute 04/18/2024 2.47     Monocytes Absolute 04/18/2024 0.69     Eosinophils Absolute 04/18/2024 0.24     Basophils Absolute 04/18/2024 0.04     Protime 04/18/2024 12.0     INR 04/18/2024 1.1     aPTT 04/18/2024 22 (L)     POCT pH, Venous 04/18/2024 7.44 (H)     POCT pCO2, Venous 04/18/2024 37 (L)     POCT pO2, Venous 04/18/2024 68 (H)     POCT SO2, Venous  04/18/2024 95 (H)     POCT Oxy Hemoglobin, Lewis* 04/18/2024 92.8 (H)     POCT Hematocrit Calculat* 04/18/2024 40.0     POCT Sodium, Venous 04/18/2024 135 (L)     POCT Potassium, Venous 04/18/2024 4.4     POCT Chloride, Venous 04/18/2024 102     POCT Ionized Calicum, Ve* 04/18/2024 1.13     POCT Glucose, Venous 04/18/2024 105 (H)     POCT Lactate, Venous 04/18/2024 1.3     POCT Base Excess, Venous 04/18/2024 1.1     POCT HCO3 Calculated, Ve* 04/18/2024 25.1     POCT Hemoglobin, Venous 04/18/2024 13.4     POCT Anion Gap, Venous 04/18/2024 12.0     Patient Temperature 04/18/2024 37.0     FiO2 04/18/2024 21     Glucose 04/18/2024 99     Sodium 04/18/2024 138     Potassium 04/18/2024 4.3     Chloride 04/18/2024 103     Bicarbonate 04/18/2024 25     Anion Gap 04/18/2024 14     Urea Nitrogen 04/18/2024 24 (H)     Creatinine 04/18/2024 0.89     eGFR 04/18/2024 66     Calcium 04/18/2024 8.6     Albumin 04/18/2024 3.4     Alkaline Phosphatase 04/18/2024 98     Total Protein 04/18/2024 6.2 (L)     AST 04/18/2024 17     Bilirubin, Total 04/18/2024 0.8     ALT 04/18/2024 16     Magnesium 04/18/2024 1.86     Phosphorus 04/18/2024 4.1     ABO TYPE 04/18/2024 O     Rh TYPE 04/18/2024 POS     ANTIBODY SCREEN 04/18/2024 NEG     Ammonia 04/18/2024 24     Vitamin B1, Whole Blood 04/18/2024 163     Vitamin B12 04/18/2024 274     Color, Urine 04/18/2024 Light-Yellow     Appearance, Urine 04/18/2024 Clear     Specific Gravity, Urine 04/18/2024 1.019     pH, Urine 04/18/2024 5.5     Protein, Urine 04/18/2024 NEGATIVE     Glucose, Urine 04/18/2024 Normal     Blood, Urine 04/18/2024 NEGATIVE     Ketones, Urine 04/18/2024 NEGATIVE     Bilirubin, Urine 04/18/2024 NEGATIVE     Urobilinogen, Urine 04/18/2024 Normal     Nitrite, Urine 04/18/2024 NEGATIVE     Leukocyte Esterase, Urine 04/18/2024 NEGATIVE     Extra Tube 04/18/2024 Hold for add-ons.     POCT Glucose 04/18/2024 106 (H)     POCT Glucose 04/18/2024 116 (H)     POCT Glucose 04/18/2024  116 (H)     POCT Glucose 04/18/2024 124 (H)     POCT Glucose 04/18/2024 192 (H)     Keppra 04/19/2024 28     POCT Glucose 04/18/2024 112 (H)     POCT Glucose 04/19/2024 118 (H)     WBC 04/20/2024 8.4     nRBC 04/20/2024 0.0     RBC 04/20/2024 3.67 (L)     Hemoglobin 04/20/2024 11.9 (L)     Hematocrit 04/20/2024 37.2     MCV 04/20/2024 101 (H)     MCH 04/20/2024 32.4     MCHC 04/20/2024 32.0     RDW 04/20/2024 12.2     Platelets 04/20/2024 314     Neutrophils % 04/20/2024 69.0     Immature Granulocytes %,* 04/20/2024 0.4     Lymphocytes % 04/20/2024 21.2     Monocytes % 04/20/2024 6.5     Eosinophils % 04/20/2024 2.5     Basophils % 04/20/2024 0.4     Neutrophils Absolute 04/20/2024 5.77 (H)     Immature Granulocytes Ab* 04/20/2024 0.03     Lymphocytes Absolute 04/20/2024 1.77     Monocytes Absolute 04/20/2024 0.54     Eosinophils Absolute 04/20/2024 0.21     Basophils Absolute 04/20/2024 0.03     Magnesium 04/20/2024 1.86     Glucose 04/20/2024 110 (H)     Sodium 04/20/2024 139     Potassium 04/20/2024 3.9     Chloride 04/20/2024 105     Bicarbonate 04/20/2024 25     Anion Gap 04/20/2024 13     Urea Nitrogen 04/20/2024 14     Creatinine 04/20/2024 0.69     eGFR 04/20/2024 89     Calcium 04/20/2024 8.8     Phosphorus 04/20/2024 2.9     Albumin 04/20/2024 3.2 (L)    Admission on 04/15/2024, Discharged on 04/17/2024   Component Date Value    WBC 04/15/2024 10.6     nRBC 04/15/2024 0.0     RBC 04/15/2024 3.86 (L)     Hemoglobin 04/15/2024 12.7     Hematocrit 04/15/2024 37.8     MCV 04/15/2024 98     MCH 04/15/2024 32.9     MCHC 04/15/2024 33.6     RDW 04/15/2024 12.2     Platelets 04/15/2024 309     Neutrophils % 04/15/2024 66.0     Immature Granulocytes %,* 04/15/2024 0.3     Lymphocytes % 04/15/2024 24.1     Monocytes % 04/15/2024 7.2     Eosinophils % 04/15/2024 1.9     Basophils % 04/15/2024 0.5     Neutrophils Absolute 04/15/2024 6.98 (H)     Immature Granulocytes Ab* 04/15/2024 0.03     Lymphocytes Absolute  04/15/2024 2.55     Monocytes Absolute 04/15/2024 0.76     Eosinophils Absolute 04/15/2024 0.20     Basophils Absolute 04/15/2024 0.05     Glucose 04/15/2024 102 (H)     Sodium 04/15/2024 137     Potassium 04/15/2024 4.3     Chloride 04/15/2024 100     Bicarbonate 04/15/2024 24     Urea Nitrogen 04/15/2024 19     Creatinine 04/15/2024 0.80     eGFR 04/15/2024 76     Calcium 04/15/2024 9.2     Albumin 04/15/2024 3.7     Alkaline Phosphatase 04/15/2024 121     Total Protein 04/15/2024 7.5     AST 04/15/2024 18     Bilirubin, Total 04/15/2024 0.6     ALT 04/15/2024 17     Anion Gap 04/15/2024 13     Troponin T, High Sensiti* 04/15/2024 16 (HH)     Protime 04/15/2024 10.1     INR 04/15/2024 1.0     aPTT 04/15/2024 24.8     Ventricular Rate 04/15/2024 66     Atrial Rate 04/15/2024 66     CO Interval 04/15/2024 152     QRS Duration 04/15/2024 76     QT Interval 04/15/2024 406     QTC Calculation(Bazett) 04/15/2024 425     P Axis 04/15/2024 42     R Paulina 04/15/2024 -10     T Paulina 04/15/2024 24     QRS Count 04/15/2024 10     Q Onset 04/15/2024 221     P Onset 04/15/2024 145     P Offset 04/15/2024 203     T Offset 04/15/2024 424     QTC Fredericia 04/15/2024 419     POCT Glucose 04/15/2024 112 (H)     WBC 04/17/2024 9.0     nRBC 04/17/2024 0.0     RBC 04/17/2024 3.74 (L)     Hemoglobin 04/17/2024 12.0     Hematocrit 04/17/2024 36.2     MCV 04/17/2024 97     MCH 04/17/2024 32.1     MCHC 04/17/2024 33.1     RDW 04/17/2024 12.2     Platelets 04/17/2024 312     Glucose 04/17/2024 116 (H)     Sodium 04/17/2024 138     Potassium 04/17/2024 4.1     Chloride 04/17/2024 102     Bicarbonate 04/17/2024 25     Urea Nitrogen 04/17/2024 17     Creatinine 04/17/2024 0.70     eGFR 04/17/2024 89     Calcium 04/17/2024 8.8     Anion Gap 04/17/2024 11     Thyroid Stimulating Horm* 04/17/2024 3.89     Cholesterol 04/17/2024 133     HDL-Cholesterol 04/17/2024 43.0 (L)     Cholesterol/HDL Ratio 04/17/2024 3.1     LDL Calculated 04/17/2024 68      Triglycerides 04/17/2024 108     POCT Glucose 04/17/2024 152 (H)    Admission on 04/07/2024, Discharged on 04/10/2024   Component Date Value    POCT pH, Venous 04/07/2024 7.44 (H)     POCT pCO2, Venous 04/07/2024 43     POCT pO2, Venous 04/07/2024 43     POCT SO2, Venous 04/07/2024 67     POCT Oxy Hemoglobin, Lewis* 04/07/2024 66.0     POCT Hematocrit Calculat* 04/07/2024 41.0     POCT Sodium, Venous 04/07/2024 138     POCT Potassium, Venous 04/07/2024 4.4     POCT Chloride, Venous 04/07/2024 103     POCT Ionized Calicum, Ve* 04/07/2024 1.15     POCT Glucose, Venous 04/07/2024 177 (H)     POCT Lactate, Venous 04/07/2024 1.9     POCT Base Excess, Venous 04/07/2024 4.4 (H)     POCT HCO3 Calculated, Ve* 04/07/2024 29.2 (H)     POCT Hemoglobin, Venous 04/07/2024 13.8     POCT Anion Gap, Venous 04/07/2024 10.0     Patient Temperature 04/07/2024 37.0     Protime 04/07/2024 10.2     INR 04/07/2024 0.9     aPTT 04/07/2024 23 (L)     ABO TYPE 04/07/2024 O     Rh TYPE 04/07/2024 POS     ANTIBODY SCREEN 04/07/2024 NEG     Troponin I, High Sensiti* 04/07/2024 12     POCT Glucose 04/07/2024 155 (H)     POCT Glucose 04/07/2024 149 (H)     POCT Glucose 04/08/2024 113 (H)     WBC 04/09/2024 12.8 (H)     nRBC 04/09/2024 0.0     RBC 04/09/2024 3.65 (L)     Hemoglobin 04/09/2024 11.8 (L)     Hematocrit 04/09/2024 36.1     MCV 04/09/2024 99     MCH 04/09/2024 32.3     MCHC 04/09/2024 32.7     RDW 04/09/2024 12.2     Platelets 04/09/2024 262     Glucose 04/09/2024 120 (H)     Sodium 04/09/2024 137     Potassium 04/09/2024 4.1     Chloride 04/09/2024 103     Bicarbonate 04/09/2024 27     Anion Gap 04/09/2024 11     Urea Nitrogen 04/09/2024 24 (H)     Creatinine 04/09/2024 0.77     eGFR 04/09/2024 79     Calcium 04/09/2024 8.5 (L)     Phosphorus 04/09/2024 3.4     Albumin 04/09/2024 3.4     Magnesium 04/09/2024 1.82    Admission on 04/07/2024, Discharged on 04/07/2024   Component Date Value    WBC 04/07/2024 18.0 (H)     nRBC 04/07/2024  0.0     RBC 04/07/2024 4.37     Hemoglobin 04/07/2024 14.2     Hematocrit 04/07/2024 43.1     MCV 04/07/2024 99     MCH 04/07/2024 32.5     MCHC 04/07/2024 32.9     RDW 04/07/2024 12.3     Platelets 04/07/2024 341     Neutrophils % 04/07/2024 73.8     Immature Granulocytes %,* 04/07/2024 1.2 (H)     Lymphocytes % 04/07/2024 16.6     Monocytes % 04/07/2024 7.5     Eosinophils % 04/07/2024 0.6     Basophils % 04/07/2024 0.3     Neutrophils Absolute 04/07/2024 13.28 (H)     Immature Granulocytes Ab* 04/07/2024 0.21     Lymphocytes Absolute 04/07/2024 2.98     Monocytes Absolute 04/07/2024 1.34 (H)     Eosinophils Absolute 04/07/2024 0.11     Basophils Absolute 04/07/2024 0.05     Glucose 04/07/2024 165 (H)     Sodium 04/07/2024 138     Potassium 04/07/2024 4.6     Chloride 04/07/2024 101     Bicarbonate 04/07/2024 27     Urea Nitrogen 04/07/2024 37 (H)     Creatinine 04/07/2024 1.00     eGFR 04/07/2024 58 (L)     Calcium 04/07/2024 9.1     Anion Gap 04/07/2024 10     ABO TYPE 04/07/2024 O     Rh TYPE 04/07/2024 POS     ANTIBODY SCREEN 04/07/2024 NEG     Troponin T, High Sensiti* 04/07/2024 17 (HH)    Lab on 03/13/2024   Component Date Value    WBC 03/13/2024 7.6     nRBC 03/13/2024 0.0     RBC 03/13/2024 4.13     Hemoglobin 03/13/2024 13.4     Hematocrit 03/13/2024 40.2     MCV 03/13/2024 97     MCH 03/13/2024 32.4     MCHC 03/13/2024 33.3     RDW 03/13/2024 12.1     Platelets 03/13/2024 299     Neutrophils % 03/13/2024 61.0     Immature Granulocytes %,* 03/13/2024 0.1     Lymphocytes % 03/13/2024 26.9     Monocytes % 03/13/2024 7.9     Eosinophils % 03/13/2024 3.4     Basophils % 03/13/2024 0.7     Neutrophils Absolute 03/13/2024 4.63     Immature Granulocytes Ab* 03/13/2024 0.01     Lymphocytes Absolute 03/13/2024 2.04     Monocytes Absolute 03/13/2024 0.60     Eosinophils Absolute 03/13/2024 0.26     Basophils Absolute 03/13/2024 0.05     LDL, Direct 03/13/2024 74     Glucose 03/13/2024 127 (H)     Sodium 03/13/2024  145     Potassium 03/13/2024 4.3     Chloride 03/13/2024 107     Bicarbonate 03/13/2024 31     Anion Gap 03/13/2024 11     Urea Nitrogen 03/13/2024 21     Creatinine 03/13/2024 0.78     eGFR 03/13/2024 78     Calcium 03/13/2024 9.2     Albumin 03/13/2024 3.7     Alkaline Phosphatase 03/13/2024 95     Total Protein 03/13/2024 7.0     AST 03/13/2024 15     Bilirubin, Total 03/13/2024 0.6     ALT 03/13/2024 11     Hemoglobin A1C 03/13/2024 6.3 (H)     Estimated Average Glucose 03/13/2024 134     Cholesterol 03/13/2024 138     HDL-Cholesterol 03/13/2024 42.4     Cholesterol/HDL Ratio 03/13/2024 3.3     Non-HDL Cholesterol 03/13/2024 96     Thyroid Stimulating Horm* 03/13/2024 1.95         Assessment/Plan   1. CAD with PCI and stent deployment to high-grade LCX stenosis 06/16/2003. She is doing well without any anginal symptoms. The patient had a surveillance pharmacological nuclear stress test on 7/1/2014 which was negative for evidence of both ischemia and infarction. Due to some recent atypical chest discomfort she actually had a repeat pharmacological nuclear stress test on 07/21/2017 which was likewise negative for any perfusion abnormalities. She is feeling quite well at the present time. She did have an EKG performed on 04/19/2019 and again today 10/22/2019 both of which are unremarkable. She will remain on the present aspirin 81 mg daily and Toprol- mg daily q.a.m. the patient was recently admitted to [facility] 8/13/2024 - 8/14/2024 with difficulty walking some minor shortness of breath swelling of her knees.  Chest x-ray on 8/12/2024 was equivocal for volume overload.  Echocardiogram on 8/13/2024 was was notable for an estimated LV ejection fraction of 70-75%.  The patient was started on Farxiga 10 mg daily.  Lab work from 8/14/2024 included a normal CBC and SMA panel except glucose of 132.  Creatinine was 0.98.  2. Hyperlipidemia. Reasonable response to atorvastatin 80 mg daily. Recent lipid  panel from 10/06/2019 included cholesterol 145 LDL 69 HDL 43 triglyceride 162. Her SMA panel was normal except for glucose of 129. The glycohemoglobin however was 5.6%. CBC was normal and her TSH was 1.89. More recent FLP, 03/2021 showed chol 142, HDL 42, LDL 61 with trig 192.  3. Hypertension. Blood pressure is normal on current medicines.  Systolic value borderline elevated today but for now we will continue metoprolol succinate 100 mg daily and losartan -25 mg daily unchanged.  4. Primary hypothyroidism.  5. Osteoarthritis.  6. Lumbosacral spinal DJD.  7. Status post right total hip replacement for severe osteoarthritis of the right hip 06/10/2011, [facility], Dr. Teresa.  8. Bilateral knee DJD.  9. Right wrist fracture. The patient sustained a recent fall on 09/20/2019 resulting in a fracture of the distal right radius.  10. Hx of covid-19 vaccines.         Orders:  No orders of the defined types were placed in this encounter.     Followup Appts:  Future Appointments   Date Time Provider Department Center   10/4/2024  2:30 PM Jeanne Lyn MD [id number] [facility]   1/14/2025  3:30 PM Harrison Prieto MD [id number] [facility]           ____________________________________________________________  Harrison Prieto MD  [facility]  Assistant Clinical Professor, [employer]  [facility]

## 2024-09-25 ENCOUNTER — APPOINTMENT (OUTPATIENT)
Dept: PRIMARY CARE | Facility: CLINIC | Age: 79
End: 2024-09-25
Payer: MEDICARE

## 2024-10-01 DIAGNOSIS — I50.31 ACUTE HEART FAILURE WITH PRESERVED EJECTION FRACTION: Primary | ICD-10-CM

## 2024-10-01 RX ORDER — DAPAGLIFLOZIN 10 MG/1
10 TABLET, FILM COATED ORAL DAILY
Qty: 90 TABLET | Refills: 3 | Status: SHIPPED | OUTPATIENT
Start: 2024-10-01 | End: 2024-10-04 | Stop reason: ALTCHOICE

## 2024-10-03 DIAGNOSIS — F32.A ANXIETY AND DEPRESSION: ICD-10-CM

## 2024-10-03 DIAGNOSIS — F41.9 ANXIETY AND DEPRESSION: ICD-10-CM

## 2024-10-04 ENCOUNTER — OFFICE VISIT (OUTPATIENT)
Dept: PRIMARY CARE | Facility: CLINIC | Age: 79
End: 2024-10-04
Payer: MEDICARE

## 2024-10-04 VITALS
OXYGEN SATURATION: 95 % | BODY MASS INDEX: 37.92 KG/M2 | DIASTOLIC BLOOD PRESSURE: 80 MMHG | HEIGHT: 63 IN | WEIGHT: 214 LBS | HEART RATE: 91 BPM | SYSTOLIC BLOOD PRESSURE: 142 MMHG | TEMPERATURE: 97.6 F

## 2024-10-04 DIAGNOSIS — R06.2 WHEEZING: ICD-10-CM

## 2024-10-04 DIAGNOSIS — I10 ESSENTIAL HYPERTENSION: ICD-10-CM

## 2024-10-04 DIAGNOSIS — E03.9 ACQUIRED HYPOTHYROIDISM: ICD-10-CM

## 2024-10-04 DIAGNOSIS — E78.2 MIXED HYPERLIPIDEMIA: ICD-10-CM

## 2024-10-04 DIAGNOSIS — R73.03 PRE-DIABETES: ICD-10-CM

## 2024-10-04 DIAGNOSIS — Z23 ENCOUNTER FOR IMMUNIZATION: ICD-10-CM

## 2024-10-04 DIAGNOSIS — Z09 HOSPITAL DISCHARGE FOLLOW-UP: Primary | ICD-10-CM

## 2024-10-04 PROBLEM — H92.03 OTALGIA OF BOTH EARS: Status: RESOLVED | Noted: 2024-03-12 | Resolved: 2024-10-04

## 2024-10-04 PROBLEM — Z86.79 HISTORY OF HYPERTENSION: Status: RESOLVED | Noted: 2024-03-12 | Resolved: 2024-10-04

## 2024-10-04 PROBLEM — R73.9 HYPERGLYCEMIA: Status: RESOLVED | Noted: 2023-11-12 | Resolved: 2024-10-04

## 2024-10-04 PROBLEM — R10.9 ABDOMINAL PAIN: Status: RESOLVED | Noted: 2023-05-11 | Resolved: 2024-10-04

## 2024-10-04 PROCEDURE — 1126F AMNT PAIN NOTED NONE PRSNT: CPT | Performed by: INTERNAL MEDICINE

## 2024-10-04 PROCEDURE — 1159F MED LIST DOCD IN RCRD: CPT | Performed by: INTERNAL MEDICINE

## 2024-10-04 PROCEDURE — 3079F DIAST BP 80-89 MM HG: CPT | Performed by: INTERNAL MEDICINE

## 2024-10-04 PROCEDURE — 99214 OFFICE O/P EST MOD 30 MIN: CPT | Performed by: INTERNAL MEDICINE

## 2024-10-04 PROCEDURE — 99204 OFFICE O/P NEW MOD 45 MIN: CPT | Performed by: INTERNAL MEDICINE

## 2024-10-04 PROCEDURE — 90662 IIV NO PRSV INCREASED AG IM: CPT | Performed by: INTERNAL MEDICINE

## 2024-10-04 PROCEDURE — 3077F SYST BP >= 140 MM HG: CPT | Performed by: INTERNAL MEDICINE

## 2024-10-04 RX ORDER — ALBUTEROL SULFATE 90 UG/1
2 INHALANT RESPIRATORY (INHALATION) EVERY 6 HOURS PRN
Qty: 18 G | Refills: 3 | Status: SHIPPED | OUTPATIENT
Start: 2024-10-04

## 2024-10-04 RX ORDER — ALBUTEROL SULFATE 90 UG/1
2 INHALANT RESPIRATORY (INHALATION) EVERY 6 HOURS PRN
COMMUNITY
End: 2024-10-04 | Stop reason: ALTCHOICE

## 2024-10-04 RX ORDER — ALBUTEROL SULFATE 90 UG/1
2 INHALANT RESPIRATORY (INHALATION) EVERY 6 HOURS PRN
COMMUNITY
End: 2024-10-04 | Stop reason: SDUPTHER

## 2024-10-04 RX ORDER — METOPROLOL SUCCINATE 100 MG/1
100 TABLET, EXTENDED RELEASE ORAL DAILY
Qty: 90 TABLET | Refills: 1 | Status: SHIPPED | OUTPATIENT
Start: 2024-10-04 | End: 2025-04-02

## 2024-10-04 RX ORDER — LOSARTAN POTASSIUM AND HYDROCHLOROTHIAZIDE 25; 100 MG/1; MG/1
1 TABLET ORAL DAILY
Qty: 90 TABLET | Refills: 1 | Status: SHIPPED | OUTPATIENT
Start: 2024-10-04 | End: 2025-04-02

## 2024-10-04 ASSESSMENT — PATIENT HEALTH QUESTIONNAIRE - PHQ9
1. LITTLE INTEREST OR PLEASURE IN DOING THINGS: NOT AT ALL
2. FEELING DOWN, DEPRESSED OR HOPELESS: NOT AT ALL
SUM OF ALL RESPONSES TO PHQ9 QUESTIONS 1 AND 2: 0

## 2024-10-04 ASSESSMENT — ENCOUNTER SYMPTOMS
OCCASIONAL FEELINGS OF UNSTEADINESS: 1
LOSS OF SENSATION IN FEET: 0
DEPRESSION: 0

## 2024-10-04 ASSESSMENT — PAIN SCALES - GENERAL: PAINLEVEL: 0-NO PAIN

## 2024-10-04 NOTE — PROGRESS NOTES
Subjective   Patient ID: Marybeth Duggan is a 78 y.o. female who presents for Establish Care (Hospital discharge follow up).    HPI     Patient is here to establish care and for hospital discharge follow-up  She has history of hypertension, hyperlipidemia, hypothyroidism, anxiety, prediabetes, coronary artery disease, GERD, heart failure with preserved ejection fraction?? diagnosed in August 2024, severe osteoarthritis with right hip replacement in 2011    History from hospital admission on August 12, 2024    Hospital admission 08/2024  Patient is a 78-year-old female with a past medical history significant for hypertension, hyperlipidemia, prior intracranial hemorrhage after traumatic injury, chronic left knee issues status post injections by orthopedic team who presents emergency room with a chief complaint of bilateral lower extremity pain and swelling as well as left upper extremity pain and swelling.  Patient denies any recent falls or injury.  Her legs have become significantly more swollen over the last few days.  She feels pain to the back of the legs bilaterally.  It is making it hard for her to walk as she also has chronic knee issues.  Additionally, she has developed some dyspnea on exertion and left arm pain without chest discomfort.  She denies any history of heart failure.  Patient is mostly Urdu speaking but is able to understand some English and family members at bedside translate.  She has no fever, chest pain or shortness of breath at rest.  She is not anticoagulated and denies any history of blood clots, immobility or cancer.    Hospital Course 04/24    Marybeth Duggan is a 79 y/o primarily Cro[place]-speaking female who was recently admitted to the trauma service from 4/7 - 4/10 after a fall backwards from standing off 1 step and striking the left side of her head/face on the floor resulting in a traumatic left subdural hematoma and a superficial hematoma of her left scalp. She was neurologically  intact throughout the course of her admission. CT Head imaging was stable. Evaluated by neurosurgery, non-operatively managed, recommended Keppra 500 mg BID x 7 days, holding home ASA until post-bleed day 14 (4/21), and 2-week outpatient follow-up with repeat CT Head at that time. Patient was discharged home with home health care/family assistance on 4/10. On discharge, she was neurologically intact, and medically stable.   Patient represented to the hospital on 4/15 after incoherent speech and new onset weakness of her upper extremity. She had no new episodes of fall, and had completed her 7-day course of Keppra. She underwent repeat CT and MRI, which was interpreted as stable compared to her most recent imaging on 4/8.   Patient was evaluated by neurology who recommend patient with continuous video EEG, as well as loading and maintenance with Keppra. Patient was evaluated by physical therapy and occupational therapy, who recommended to have low intensity level of continued care. Geriatrics also evaluated patient for assistance with medical management of her comorbidities. Patient did not have any epileptiform discharges on her video EEG during her hospital stay. Re-presentation with weakness and aphasia was likely secondary to possible post-ictal aphasia. At time of discharge, patient was tolerating regular diet, ambulating appropriately, and voiding adequately. Pain was well-controlled. Patient will continue on keppra 750mg BID, and will follow up with Epilepsy outpatient. Patient will also follow up with Neurosurgery, Trauma Surgery clinic and her Primary Care Provider.        Visit 10/4/24:  She was admitted as new onset heart failure in 09/2024??  Has seen Dr. Prieto after hospital discharge  No diagnosis of CHF in the chart  She was started on Farxiga and losartan-hydrochlorothiazide  Seeing cardiologist Dr. Prieto      Denied any complaints today  She is accompanied by her daughter today  Most of the history  "is obtained from the chart-from prior records    Review of Systems   Constitutional:  Negative for chills, fatigue and unexpected weight change.   HENT:  Negative for postnasal drip, sinus pressure and trouble swallowing.    Respiratory:  Negative for cough, shortness of breath and wheezing.    Cardiovascular:  Negative for chest pain, palpitations and leg swelling.   Gastrointestinal:  Negative for abdominal pain, blood in stool, nausea and vomiting.   Genitourinary:  Negative for dysuria and frequency.   Musculoskeletal:  Positive for arthralgias. Negative for back pain and myalgias.   Skin:  Negative for rash.   Neurological:  Negative for tremors, seizures and numbness.   Psychiatric/Behavioral:  Negative for behavioral problems.        Objective   /80 (BP Location: Left arm, Patient Position: Sitting, BP Cuff Size: Adult)   Pulse 91   Temp 36.4 °C (97.6 °F) (Temporal)   Ht 1.6 m (5' 3\")   Wt 97.1 kg (214 lb)   SpO2 95%   BMI 37.91 kg/m²     Physical Exam  Constitutional:       General: She is not in acute distress.     Appearance: Normal appearance.   Eyes:      Extraocular Movements: Extraocular movements intact.   Cardiovascular:      Rate and Rhythm: Normal rate and regular rhythm.      Heart sounds: Normal heart sounds. No murmur heard.     No friction rub.   Pulmonary:      Effort: Pulmonary effort is normal.      Breath sounds: Normal breath sounds. No wheezing or rales.   Abdominal:      General: Bowel sounds are normal.      Palpations: Abdomen is soft.      Tenderness: There is no abdominal tenderness. There is no guarding.   Musculoskeletal:      Right lower leg: No edema.      Left lower leg: No edema.   Neurological:      General: No focal deficit present.      Mental Status: She is alert and oriented to person, place, and time.      Cranial Nerves: No cranial nerve deficit.      Comments: Ambulates with cane/walker   Psychiatric:         Mood and Affect: Mood normal. "         Assessment/Plan        Marybeth was seen today for establish care.  Diagnoses and all orders for this visit:  Hospital discharge follow-up (Primary)  Essential hypertension  -     losartan-hydrochlorothiazide (Hyzaar) 100-25 mg tablet; Take 1 tablet by mouth once daily.  -     metoprolol succinate XL (Toprol-XL) 100 mg 24 hr tablet; Take 1 tablet (100 mg) by mouth once daily. Do not crush or chew.  -     CBC and Auto Differential; Future  -     Comprehensive Metabolic Panel; Future  Mixed hyperlipidemia  Acquired hypothyroidism  Wheezing  -     albuterol (Proventil HFA) 90 mcg/actuation inhaler; Inhale 2 puffs every 6 hours if needed for wheezing.  Pre-diabetes  -     Hemoglobin A1C; Future  Encounter for immunization  Other orders  -     Flu vaccine, trivalent, preservative free, HIGH-DOSE, age 65y+ (Fluzone)    As per patient she has not been taking her metoprolol for few weeks  Based on her blood pressure advised lower dose of metoprolol however she has seen Dr. Prieto recently, wants to continue with 100 mg of metoprolol  Advised to check blood pressure at home, if systolic less than 110 advised to hold metoprolol  Reviewed hospital records    Lab Results   Component Value Date    WBC 8.7 08/14/2024    HGB 14.4 08/14/2024    HCT 43.1 08/14/2024     08/14/2024     08/14/2024     Lab Results   Component Value Date    GLUCOSE 132 (H) 08/14/2024    CALCIUM 8.7 08/14/2024     08/14/2024    K 3.6 08/14/2024    CO2 28 08/14/2024     08/14/2024    BUN 29 (H) 08/14/2024    CREATININE 0.98 08/14/2024     Lab Results   Component Value Date    TSH 1.65 08/12/2024     Lab Results   Component Value Date    CHOL 133 04/17/2024    CHOL 138 03/13/2024    CHOL 165 11/09/2023     Lab Results   Component Value Date    HDL 43.0 (L) 04/17/2024    HDL 42.4 03/13/2024    HDL 45.5 11/09/2023     Lab Results   Component Value Date    LDLCALC 68 04/17/2024    LDLCALC 73 11/09/2023     Lab Results  "  Component Value Date    TRIG 108 04/17/2024    TRIG 232 (H) 11/09/2023    TRIG 162 (H) 11/10/2022     No components found for: \"CHOLHDL\"    Lab Results   Component Value Date    HGBA1C 6.0 (H) 08/12/2024         Current Outpatient Medications   Medication Instructions    albuterol (Proventil HFA) 90 mcg/actuation inhaler 2 puffs, inhalation, Every 6 hours PRN    aspirin 81 mg, oral, Daily    atorvastatin (LIPITOR) 80 mg, oral, Nightly    cholecalciferol (VITAMIN D3) 50 mcg, oral, Daily    dapagliflozin propanediol (FARXIGA) 10 mg, oral, Daily    levothyroxine (SYNTHROID, LEVOXYL) 88 mcg, oral, Daily before breakfast    losartan-hydrochlorothiazide (Hyzaar) 100-25 mg tablet 1 tablet, oral, Daily    metoprolol succinate XL (TOPROL-XL) 100 mg, oral, Daily, Do not crush or chew.    PARoxetine (PAXIL) 20 mg, oral, Daily       "

## 2024-10-07 ASSESSMENT — ENCOUNTER SYMPTOMS
PALPITATIONS: 0
SINUS PRESSURE: 0
FATIGUE: 0
UNEXPECTED WEIGHT CHANGE: 0
BACK PAIN: 0
BLOOD IN STOOL: 0
DYSURIA: 0
SHORTNESS OF BREATH: 0
COUGH: 0
MYALGIAS: 0
ARTHRALGIAS: 1
CHILLS: 0
TREMORS: 0
VOMITING: 0
NAUSEA: 0
WHEEZING: 0
NUMBNESS: 0
FREQUENCY: 0
ABDOMINAL PAIN: 0
TROUBLE SWALLOWING: 0
SEIZURES: 0

## 2024-10-17 DIAGNOSIS — E03.9 HYPOTHYROIDISM, UNSPECIFIED TYPE: ICD-10-CM

## 2024-10-21 RX ORDER — LEVOTHYROXINE SODIUM 88 UG/1
88 TABLET ORAL
Qty: 90 TABLET | Refills: 1 | Status: SHIPPED | OUTPATIENT
Start: 2024-10-21

## 2024-10-24 RX ORDER — PAROXETINE HYDROCHLORIDE 20 MG/1
20 TABLET, FILM COATED ORAL DAILY
Qty: 90 TABLET | Refills: 1 | Status: SHIPPED | OUTPATIENT
Start: 2024-10-24

## 2024-11-07 ENCOUNTER — PATIENT OUTREACH (OUTPATIENT)
Dept: PRIMARY CARE | Facility: CLINIC | Age: 79
End: 2024-11-07
Payer: MEDICARE

## 2024-11-07 NOTE — PROGRESS NOTES
Unable to reach patient for  A F/U call    LVM with call back number for patient to call if needed   If no voicemail available call attempts x 2 were made to contact the patient to assist with any questions or concerns patient may have.     L/M encouraged patient to call providers for any questions concerns or change in condition

## 2024-12-25 DIAGNOSIS — E78.5 HYPERLIPIDEMIA, UNSPECIFIED HYPERLIPIDEMIA TYPE: ICD-10-CM

## 2024-12-26 RX ORDER — ATORVASTATIN CALCIUM 80 MG/1
80 TABLET, FILM COATED ORAL NIGHTLY
Qty: 100 TABLET | Refills: 2 | Status: SHIPPED | OUTPATIENT
Start: 2024-12-26

## 2024-12-27 DIAGNOSIS — E03.9 HYPOTHYROIDISM, UNSPECIFIED TYPE: ICD-10-CM

## 2025-01-02 ENCOUNTER — LAB (OUTPATIENT)
Dept: LAB | Facility: LAB | Age: 80
End: 2025-01-02
Payer: MEDICARE

## 2025-01-02 DIAGNOSIS — I10 ESSENTIAL HYPERTENSION: ICD-10-CM

## 2025-01-02 DIAGNOSIS — R73.03 PRE-DIABETES: ICD-10-CM

## 2025-01-02 LAB
ALBUMIN SERPL BCP-MCNC: 3.9 G/DL (ref 3.4–5)
ALP SERPL-CCNC: 107 U/L (ref 33–136)
ALT SERPL W P-5'-P-CCNC: 15 U/L (ref 7–45)
ANION GAP SERPL CALCULATED.3IONS-SCNC: 12 MMOL/L (ref 10–20)
AST SERPL W P-5'-P-CCNC: 20 U/L (ref 9–39)
BASOPHILS # BLD AUTO: 0.03 X10*3/UL (ref 0–0.1)
BASOPHILS NFR BLD AUTO: 0.4 %
BILIRUB SERPL-MCNC: 0.6 MG/DL (ref 0–1.2)
BUN SERPL-MCNC: 20 MG/DL (ref 6–23)
CALCIUM SERPL-MCNC: 9.1 MG/DL (ref 8.6–10.3)
CHLORIDE SERPL-SCNC: 104 MMOL/L (ref 98–107)
CO2 SERPL-SCNC: 28 MMOL/L (ref 21–32)
CREAT SERPL-MCNC: 0.84 MG/DL (ref 0.5–1.05)
EGFRCR SERPLBLD CKD-EPI 2021: 71 ML/MIN/1.73M*2
EOSINOPHIL # BLD AUTO: 0.21 X10*3/UL (ref 0–0.4)
EOSINOPHIL NFR BLD AUTO: 2.7 %
ERYTHROCYTE [DISTWIDTH] IN BLOOD BY AUTOMATED COUNT: 12.5 % (ref 11.5–14.5)
EST. AVERAGE GLUCOSE BLD GHB EST-MCNC: 140 MG/DL
GLUCOSE SERPL-MCNC: 124 MG/DL (ref 74–99)
HBA1C MFR BLD: 6.5 %
HCT VFR BLD AUTO: 40.4 % (ref 36–46)
HGB BLD-MCNC: 13.3 G/DL (ref 12–16)
IMM GRANULOCYTES # BLD AUTO: 0.03 X10*3/UL (ref 0–0.5)
IMM GRANULOCYTES NFR BLD AUTO: 0.4 % (ref 0–0.9)
LYMPHOCYTES # BLD AUTO: 2.18 X10*3/UL (ref 0.8–3)
LYMPHOCYTES NFR BLD AUTO: 28.5 %
MCH RBC QN AUTO: 32.8 PG (ref 26–34)
MCHC RBC AUTO-ENTMCNC: 32.9 G/DL (ref 32–36)
MCV RBC AUTO: 100 FL (ref 80–100)
MONOCYTES # BLD AUTO: 0.68 X10*3/UL (ref 0.05–0.8)
MONOCYTES NFR BLD AUTO: 8.9 %
NEUTROPHILS # BLD AUTO: 4.53 X10*3/UL (ref 1.6–5.5)
NEUTROPHILS NFR BLD AUTO: 59.1 %
NRBC BLD-RTO: 0 /100 WBCS (ref 0–0)
PLATELET # BLD AUTO: 314 X10*3/UL (ref 150–450)
POTASSIUM SERPL-SCNC: 4.5 MMOL/L (ref 3.5–5.3)
PROT SERPL-MCNC: 6.8 G/DL (ref 6.4–8.2)
RBC # BLD AUTO: 4.05 X10*6/UL (ref 4–5.2)
SODIUM SERPL-SCNC: 139 MMOL/L (ref 136–145)
WBC # BLD AUTO: 7.7 X10*3/UL (ref 4.4–11.3)

## 2025-01-02 PROCEDURE — 85025 COMPLETE CBC W/AUTO DIFF WBC: CPT

## 2025-01-02 PROCEDURE — 83036 HEMOGLOBIN GLYCOSYLATED A1C: CPT

## 2025-01-02 PROCEDURE — 80053 COMPREHEN METABOLIC PANEL: CPT

## 2025-01-14 ENCOUNTER — OFFICE VISIT (OUTPATIENT)
Dept: CARDIOLOGY | Facility: CLINIC | Age: 80
End: 2025-01-14
Payer: MEDICARE

## 2025-01-14 VITALS
DIASTOLIC BLOOD PRESSURE: 66 MMHG | BODY MASS INDEX: 36.06 KG/M2 | OXYGEN SATURATION: 94 % | HEART RATE: 61 BPM | SYSTOLIC BLOOD PRESSURE: 112 MMHG | WEIGHT: 211.2 LBS | HEIGHT: 64 IN

## 2025-01-14 DIAGNOSIS — E78.2 MIXED HYPERLIPIDEMIA: Primary | ICD-10-CM

## 2025-01-14 DIAGNOSIS — I10 ESSENTIAL HYPERTENSION: ICD-10-CM

## 2025-01-14 PROCEDURE — 1159F MED LIST DOCD IN RCRD: CPT | Performed by: NURSE PRACTITIONER

## 2025-01-14 PROCEDURE — 1126F AMNT PAIN NOTED NONE PRSNT: CPT | Performed by: NURSE PRACTITIONER

## 2025-01-14 PROCEDURE — 99214 OFFICE O/P EST MOD 30 MIN: CPT | Performed by: NURSE PRACTITIONER

## 2025-01-14 PROCEDURE — 1036F TOBACCO NON-USER: CPT | Performed by: NURSE PRACTITIONER

## 2025-01-14 PROCEDURE — 3078F DIAST BP <80 MM HG: CPT | Performed by: NURSE PRACTITIONER

## 2025-01-14 PROCEDURE — 3074F SYST BP LT 130 MM HG: CPT | Performed by: NURSE PRACTITIONER

## 2025-01-14 PROCEDURE — 1160F RVW MEDS BY RX/DR IN RCRD: CPT | Performed by: NURSE PRACTITIONER

## 2025-01-14 PROCEDURE — G2211 COMPLEX E/M VISIT ADD ON: HCPCS | Performed by: NURSE PRACTITIONER

## 2025-01-14 ASSESSMENT — ENCOUNTER SYMPTOMS
CARDIOVASCULAR NEGATIVE: 1
NEUROLOGICAL NEGATIVE: 1
DEPRESSION: 0
GASTROINTESTINAL NEGATIVE: 1
CONSTITUTIONAL NEGATIVE: 1
LOSS OF SENSATION IN FEET: 0
OCCASIONAL FEELINGS OF UNSTEADINESS: 1
MUSCULOSKELETAL NEGATIVE: 1
RESPIRATORY NEGATIVE: 1

## 2025-01-14 ASSESSMENT — PAIN SCALES - GENERAL: PAINLEVEL_OUTOF10: 0-NO PAIN

## 2025-01-14 NOTE — PROGRESS NOTES
"Chief Complaint:   Hyperlipidemia and Hypotension    History Of Present Illness:    .Ms Duggan returns in follow up with her daughter.  Denies chest pain, sob, palpitations or pedal edema.           Last Recorded Vitals:  Blood pressure 112/66, pulse 61, height 1.613 m (5' 3.5\"), weight 95.8 kg (211 lb 3.2 oz), SpO2 94%.     Past Medical History:  Past Medical History:   Diagnosis Date    Abdominal pain 05/11/2023    Bilateral knee pain     Bilateral shoulder pain     Hematoma 04/07/2024    subdural    History of hypertension 03/12/2024    Comment on above: Added by Problem List Migration; 2013-7-3;      Hyperglycemia 11/12/2023    Otalgia of both ears 03/12/2024    Personal history of other diseases of the circulatory system 08/08/2013    History of hypertension    Personal history of other mental and behavioral disorders 08/08/2013    History of depression    Pure hypercholesterolemia, unspecified 09/19/2013    Low-density-lipoid-type (LDL) hyperlipoproteinemia    Thyroid condition         Past Surgical History:  Past Surgical History:   Procedure Laterality Date    GALLBLADDER SURGERY  11/30/2016    Gallbladder Surgery    OTHER SURGICAL HISTORY  10/22/2019    Hip replacement    OTHER SURGICAL HISTORY  10/22/2019    Thyroidectomy total       Social History:  Social History     Socioeconomic History    Marital status:    Tobacco Use    Smoking status: Never    Smokeless tobacco: Never   Vaping Use    Vaping status: Never Used   Substance and Sexual Activity    Alcohol use: Yes     Comment: occasional    Drug use: Never    Sexual activity: Defer     Social Drivers of Health     Financial Resource Strain: Low Risk  (8/13/2024)    Overall Financial Resource Strain (CARDIA)     Difficulty of Paying Living Expenses: Not hard at all   Food Insecurity: No Food Insecurity (4/17/2024)    Hunger Vital Sign     Worried About Running Out of Food in the Last Year: Never true     Ran Out of Food in the Last Year: Never " true   Transportation Needs: No Transportation Needs (8/13/2024)    PRAPARE - Transportation     Lack of Transportation (Medical): No     Lack of Transportation (Non-Medical): No   Physical Activity: Inactive (4/17/2024)    Exercise Vital Sign     Days of Exercise per Week: 0 days     Minutes of Exercise per Session: 0 min   Social Connections: Feeling Somewhat Isolated (5/2/2024)    OASIS : Social Isolation     Frequency of experiencing loneliness or isolation: Sometimes   Intimate Partner Violence: Not At Risk (4/17/2024)    Humiliation, Afraid, Rape, and Kick questionnaire     Fear of Current or Ex-Partner: No     Emotionally Abused: No     Physically Abused: No     Sexually Abused: No   Housing Stability: Low Risk  (8/13/2024)    Housing Stability Vital Sign     Unable to Pay for Housing in the Last Year: No     Number of Times Moved in the Last Year: 1     Homeless in the Last Year: No       Family History:  No family history on file.      Allergies:  Patient has no known allergies.    Outpatient Medications:  Current Outpatient Medications   Medication Sig Dispense Refill    albuterol (Proventil HFA) 90 mcg/actuation inhaler Inhale 2 puffs every 6 hours if needed for wheezing. 18 g 3    aspirin 81 mg EC tablet Take 1 tablet (81 mg) by mouth once daily.      atorvastatin (Lipitor) 80 mg tablet TAKE 1 TABLET BY MOUTH AT  BEDTIME 100 tablet 2    cholecalciferol (Vitamin D3) 50 MCG (2000 UT) tablet Take 1 tablet (50 mcg) by mouth once daily.      dapagliflozin propanediol (Farxiga) 10 mg Take 1 tablet (10 mg) by mouth once daily. 30 tablet 11    levothyroxine (Synthroid, Levoxyl) 88 mcg tablet TAKE 1 TABLET BY MOUTH ONCE  DAILY IN THE MORNING BEFORE A  MEAL 90 tablet 1    losartan-hydrochlorothiazide (Hyzaar) 100-25 mg tablet Take 1 tablet by mouth once daily. 90 tablet 1    metoprolol succinate XL (Toprol-XL) 100 mg 24 hr tablet Take 1 tablet (100 mg) by mouth once daily. Do not crush or chew. 90 tablet 1     PARoxetine (Paxil) 20 mg tablet TAKE 1 TABLET BY MOUTH ONCE  DAILY 90 tablet 1     No current facility-administered medications for this visit.        Physical Exam:  Cardiovascular:      PMI at left midclavicular line. Normal rate. Regular rhythm. Normal S1. Normal S2.       Murmurs: There is no murmur.      No gallop.  No click. No rub.   Pulses:     Intact distal pulses.   Edema:     Peripheral edema absent.         ROS:  Review of Systems   Constitutional: Negative.   Cardiovascular: Negative.    Respiratory: Negative.     Skin: Negative.    Musculoskeletal: Negative.    Gastrointestinal: Negative.    Genitourinary: Negative.    Neurological: Negative.           Last Labs:  CBC -  Lab Results   Component Value Date    WBC 7.7 01/02/2025    HGB 13.3 01/02/2025    HCT 40.4 01/02/2025     01/02/2025     01/02/2025       CMP -  Lab Results   Component Value Date    CALCIUM 9.1 01/02/2025    PHOS 2.9 04/20/2024    PROT 6.8 01/02/2025    ALBUMIN 3.9 01/02/2025    AST 20 01/02/2025    ALT 15 01/02/2025    ALKPHOS 107 01/02/2025    BILITOT 0.6 01/02/2025       LIPID PANEL -   Lab Results   Component Value Date    CHOL 133 04/17/2024    TRIG 108 04/17/2024    HDL 43.0 (L) 04/17/2024    CHHDL 3.1 04/17/2024    LDLF 69 11/10/2022    VLDL 46 (H) 11/09/2023    NHDL 120 11/09/2023       RENAL FUNCTION PANEL -   Lab Results   Component Value Date    GLUCOSE 124 (H) 01/02/2025     01/02/2025    K 4.5 01/02/2025     01/02/2025    CO2 28 01/02/2025    ANIONGAP 12 01/02/2025    BUN 20 01/02/2025    CREATININE 0.84 01/02/2025    GFRMALE CANCELED 04/02/2023    CALCIUM 9.1 01/02/2025    PHOS 2.9 04/20/2024    ALBUMIN 3.9 01/02/2025        Lab Results   Component Value Date     (H) 08/12/2024    HGBA1C 6.5 (H) 01/02/2025         Assessment/Plan   Problem List Items Addressed This Visit    None      1. CAD with PCI and stent deployment to high-grade LCX stenosis 06/16/2003. She is doing well without any  anginal symptoms. The patient had a surveillance pharmacological nuclear stress test on 7/1/2014 which was negative for evidence of both ischemia and infarction. Due to some recent atypical chest discomfort she actually had a repeat pharmacological nuclear stress test on 07/21/2017 which was likewise negative for any perfusion abnormalities. She is feeling quite well at the present time. She did have an EKG performed on 04/19/2019 and again today 10/22/2019 both of which are unremarkable. She will remain on the present aspirin 81 mg daily and Toprol- mg daily q.a.m. the patient was recently admitted to [facility] in [place] 8/13/2024 - 8/14/2024 with difficulty walking some minor shortness of breath swelling of her knees.  Chest x-ray on 8/12/2024 was equivocal for volume overload.  Echocardiogram on 8/13/2024 was was notable for an estimated LV ejection fraction of 70-75%.  The patient was started on Farxiga 10 mg daily.  Lab work from 8/14/2024 included a normal CBC and SMA panel except glucose of 132.  Creatinine was 0.98.  2. Hyperlipidemia. Reasonable response to atorvastatin 80 mg daily. Recent lipid panel from 10/06/2019 included cholesterol 145 LDL 69 HDL 43 triglyceride 162. Her SMA panel was normal except for glucose of 129. The glycohemoglobin however was 5.6%. CBC was normal and her TSH was 1.89. More recent FLP, 03/2021 showed chol 142, HDL 42, LDL 61 with trig 192.  3. Hypertension. Blood pressure is normal on current medicines.  Systolic value borderline elevated today but for now we will continue metoprolol succinate 100 mg daily and losartan -25 mg daily unchanged.  4. Primary hypothyroidism.  5. Osteoarthritis.  6. Lumbosacral spinal DJD.  7. Status post right total hip replacement for severe osteoarthritis of the right hip 06[place]10/2011[place] [facility] [place] [facility] Dr. Teresa.  8. Bilateral knee DJD.  9. Right wrist fracture. The patient sustained a recent fall on 09/20/2019 resulting in a fracture  of the distal right radius.  10. Hx of covid-19 vaccines.        Jami Guzman, APRN-CNP

## 2025-01-22 RX ORDER — LEVOTHYROXINE SODIUM 88 UG/1
88 TABLET ORAL
Qty: 100 TABLET | Refills: 0 | Status: SHIPPED | OUTPATIENT
Start: 2025-01-22

## 2025-02-05 ENCOUNTER — OFFICE VISIT (OUTPATIENT)
Dept: PRIMARY CARE | Facility: CLINIC | Age: 80
End: 2025-02-05
Payer: MEDICARE

## 2025-02-05 VITALS
HEART RATE: 63 BPM | SYSTOLIC BLOOD PRESSURE: 122 MMHG | BODY MASS INDEX: 36.62 KG/M2 | OXYGEN SATURATION: 96 % | WEIGHT: 210 LBS | DIASTOLIC BLOOD PRESSURE: 72 MMHG | TEMPERATURE: 97.3 F

## 2025-02-05 DIAGNOSIS — E03.9 ACQUIRED HYPOTHYROIDISM: Primary | ICD-10-CM

## 2025-02-05 DIAGNOSIS — E55.9 VITAMIN D DEFICIENCY: ICD-10-CM

## 2025-02-05 DIAGNOSIS — I10 ESSENTIAL HYPERTENSION: ICD-10-CM

## 2025-02-05 DIAGNOSIS — R73.9 HYPERGLYCEMIA: ICD-10-CM

## 2025-02-05 DIAGNOSIS — G47.00 INSOMNIA, UNSPECIFIED TYPE: ICD-10-CM

## 2025-02-05 DIAGNOSIS — E78.2 MIXED HYPERLIPIDEMIA: ICD-10-CM

## 2025-02-05 PROCEDURE — 99214 OFFICE O/P EST MOD 30 MIN: CPT | Performed by: INTERNAL MEDICINE

## 2025-02-05 PROCEDURE — G2211 COMPLEX E/M VISIT ADD ON: HCPCS | Performed by: INTERNAL MEDICINE

## 2025-02-05 PROCEDURE — 3074F SYST BP LT 130 MM HG: CPT | Performed by: INTERNAL MEDICINE

## 2025-02-05 PROCEDURE — 3078F DIAST BP <80 MM HG: CPT | Performed by: INTERNAL MEDICINE

## 2025-02-05 PROCEDURE — 1126F AMNT PAIN NOTED NONE PRSNT: CPT | Performed by: INTERNAL MEDICINE

## 2025-02-05 PROCEDURE — 1159F MED LIST DOCD IN RCRD: CPT | Performed by: INTERNAL MEDICINE

## 2025-02-05 RX ORDER — TRAZODONE HYDROCHLORIDE 50 MG/1
50 TABLET ORAL NIGHTLY PRN
Qty: 90 TABLET | Refills: 1 | Status: SHIPPED | OUTPATIENT
Start: 2025-02-05 | End: 2026-02-05

## 2025-02-05 ASSESSMENT — ENCOUNTER SYMPTOMS
SINUS PRESSURE: 0
NAUSEA: 0
TREMORS: 0
LOSS OF SENSATION IN FEET: 0
SEIZURES: 0
DYSURIA: 0
MYALGIAS: 0
PALPITATIONS: 0
WHEEZING: 0
OCCASIONAL FEELINGS OF UNSTEADINESS: 0
ARTHRALGIAS: 1
COUGH: 0
BLOOD IN STOOL: 0
UNEXPECTED WEIGHT CHANGE: 0
FATIGUE: 0
ABDOMINAL PAIN: 0
CHILLS: 0
VOMITING: 0
FREQUENCY: 0
DEPRESSION: 0
SHORTNESS OF BREATH: 0
TROUBLE SWALLOWING: 0
NUMBNESS: 0
BACK PAIN: 0

## 2025-02-05 ASSESSMENT — PAIN SCALES - GENERAL: PAINLEVEL_OUTOF10: 0-NO PAIN

## 2025-02-05 NOTE — PROGRESS NOTES
Subjective   Patient ID: Marybeth Duggan is a 79 y.o. female who presents for Follow-up (4mon follow up).    HPI       She has history of hypertension, hyperlipidemia, hypothyroidism, anxiety, prediabetes, coronary artery disease, GERD, heart failure with preserved ejection fraction?? diagnosed in August 2024, severe osteoarthritis with right hip replacement in 2011, traumatic subdural hematoma in April 2024        Hospital admission 08/2024  She was admitted for bilateral leg pain and dyspnea on exertion  She sees Dr. Prieto    Hospital Course 04/24    Marybeth Duggan is a 79 y/o primarily [place]-speaking female who was recently admitted to the trauma service from 4/7 - 4/10 after a fall backwards from standing off 1 step and striking the left side of her head/face on the floor resulting in a traumatic left subdural hematoma and a superficial hematoma of her left scalp. She was neurologically intact throughout the course of her admission. CT Head imaging was stable. Evaluated by neurosurgery, non-operatively managed, recommended Keppra 500 mg BID x 7 days, holding home ASA until post-bleed day 14 (4/21), and 2-week outpatient follow-up with repeat CT Head at that time. Patient was discharged home with home health care/family assistance on 4/10. On discharge, she was neurologically intact, and medically stable.   Patient represented to the hospital on 4/15 after incoherent speech and new onset weakness of her upper extremity. She had no new episodes of fall, and had completed her 7-day course of Keppra. She underwent repeat CT and MRI, which was interpreted as stable compared to her most recent imaging on 4/8.   Patient was evaluated by neurology who recommend patient with continuous video EEG, as well as loading and maintenance with Keppra. Patient was evaluated by physical therapy and occupational therapy, who recommended to have low intensity level of continued care. Geriatrics also evaluated patient for  assistance with medical management of her comorbidities. Patient did not have any epileptiform discharges on her video EEG during her hospital stay. Re-presentation with weakness and aphasia was likely secondary to possible post-ictal aphasia. At time of discharge, patient was tolerating regular diet, ambulating appropriately, and voiding adequately. Pain was well-controlled. Patient will continue on keppra 750mg BID, and will follow up with Epilepsy outpatient. Patient will also follow up with Neurosurgery, Trauma Surgery clinic and her Primary Care Provider.        Visit 10/4/24:  She was admitted as new onset heart failure in 09/2024??  Has seen Dr. Prieto after hospital discharge  No diagnosis of CHF in the chart  She was started on Farxiga and losartan -hydrochlorothiazide  Seeing cardiologist Dr. Prieto      Denied any complaints today  She is accompanied by her daughter today  Most of the history is obtained from the chart-from prior records    Visit 02/5/25  Farxiga was stopped by her cardiologist Dr Prieto in January 2025  Complaining of insomnia since last few months    Review of Systems   Constitutional:  Negative for chills, fatigue and unexpected weight change.   HENT:  Negative for postnasal drip, sinus pressure and trouble swallowing.    Respiratory:  Negative for cough, shortness of breath and wheezing.    Cardiovascular:  Negative for chest pain, palpitations and leg swelling.   Gastrointestinal:  Negative for abdominal pain, blood in stool, nausea and vomiting.   Genitourinary:  Negative for dysuria and frequency.   Musculoskeletal:  Positive for arthralgias. Negative for back pain and myalgias.   Skin:  Negative for rash.   Neurological:  Negative for tremors, seizures and numbness.   Psychiatric/Behavioral:  Negative for behavioral problems.        Objective   /72 (BP Location: Left arm, Patient Position: Sitting, BP Cuff Size: Adult)   Pulse 63   Temp 36.3 °C (97.3 °F) (Temporal)   Wt  95.3 kg (210 lb)   SpO2 96%   BMI 36.62 kg/m²     Physical Exam  Constitutional:       General: She is not in acute distress.     Appearance: Normal appearance.   Eyes:      Extraocular Movements: Extraocular movements intact.   Cardiovascular:      Rate and Rhythm: Normal rate and regular rhythm.      Heart sounds: Normal heart sounds. No murmur heard.     No friction rub.   Pulmonary:      Effort: Pulmonary effort is normal.      Breath sounds: Normal breath sounds. No wheezing or rales.   Abdominal:      General: Bowel sounds are normal.      Palpations: Abdomen is soft.      Tenderness: There is no abdominal tenderness. There is no guarding.   Musculoskeletal:      Right lower leg: No edema.      Left lower leg: No edema.   Neurological:      General: No focal deficit present.      Mental Status: She is alert and oriented to person, place, and time.      Cranial Nerves: No cranial nerve deficit.      Comments: Ambulates with cane/walker   Psychiatric:         Mood and Affect: Mood normal.         Assessment/Plan        Marybeth was seen today for follow-up.  Diagnoses and all orders for this visit:  Acquired hypothyroidism (Primary)  -     TSH with reflex to Free T4 if abnormal; Future  Essential hypertension  -     CBC and Auto Differential; Future  -     Comprehensive Metabolic Panel; Future  Mixed hyperlipidemia  -     Lipid Panel; Future  Vitamin D deficiency  -     Vitamin D 25-Hydroxy,Total (for eval of Vitamin D levels); Future  Hyperglycemia  -     Hemoglobin A1C; Future  Insomnia, unspecified type  -     traZODone (Desyrel) 50 mg tablet; Take 1 tablet (50 mg) by mouth as needed at bedtime for sleep.      Noted on trazodone for insomnia  Reviewed cardiology notes and recent labs with patient    Lab Results   Component Value Date    WBC 7.7 01/02/2025    HGB 13.3 01/02/2025    HCT 40.4 01/02/2025     01/02/2025     01/02/2025     Lab Results   Component Value Date    GLUCOSE 124 (H)  "01/02/2025    CALCIUM 9.1 01/02/2025     01/02/2025    K 4.5 01/02/2025    CO2 28 01/02/2025     01/02/2025    BUN 20 01/02/2025    CREATININE 0.84 01/02/2025     Lab Results   Component Value Date    TSH 1.65 08/12/2024     Lab Results   Component Value Date    CHOL 133 04/17/2024    CHOL 138 03/13/2024    CHOL 165 11/09/2023     Lab Results   Component Value Date    HDL 43.0 (L) 04/17/2024    HDL 42.4 03/13/2024    HDL 45.5 11/09/2023     Lab Results   Component Value Date    LDLCALC 68 04/17/2024    LDLCALC 73 11/09/2023     Lab Results   Component Value Date    TRIG 108 04/17/2024    TRIG 232 (H) 11/09/2023    TRIG 162 (H) 11/10/2022     No components found for: \"CHOLHDL\"    Lab Results   Component Value Date    HGBA1C 6.5 (H) 01/02/2025         Current Outpatient Medications   Medication Instructions    albuterol (Proventil HFA) 90 mcg/actuation inhaler 2 puffs, inhalation, Every 6 hours PRN    aspirin 81 mg, Daily    atorvastatin (LIPITOR) 80 mg, oral, Nightly    cholecalciferol (VITAMIN D3) 50 mcg, Daily    levothyroxine (SYNTHROID, LEVOXYL) 88 mcg, oral, Daily before breakfast    losartan-hydrochlorothiazide (Hyzaar) 100-25 mg tablet 1 tablet, oral, Daily    metoprolol succinate XL (TOPROL-XL) 100 mg, oral, Daily, Do not crush or chew.    PARoxetine (PAXIL) 20 mg, oral, Daily    traZODone (DESYREL) 50 mg, oral, Nightly PRN       "

## 2025-03-13 DIAGNOSIS — F32.A ANXIETY AND DEPRESSION: ICD-10-CM

## 2025-03-13 DIAGNOSIS — F41.9 ANXIETY AND DEPRESSION: ICD-10-CM

## 2025-03-18 RX ORDER — PAROXETINE HYDROCHLORIDE 20 MG/1
20 TABLET, FILM COATED ORAL DAILY
Qty: 90 TABLET | Refills: 1 | Status: SHIPPED | OUTPATIENT
Start: 2025-03-18

## 2025-04-01 DIAGNOSIS — I10 ESSENTIAL HYPERTENSION: ICD-10-CM

## 2025-04-01 RX ORDER — LOSARTAN POTASSIUM AND HYDROCHLOROTHIAZIDE 25; 100 MG/1; MG/1
1 TABLET ORAL DAILY
Qty: 90 TABLET | Refills: 1 | Status: SHIPPED | OUTPATIENT
Start: 2025-04-01 | End: 2025-09-28

## 2025-04-01 RX ORDER — METOPROLOL SUCCINATE 100 MG/1
100 TABLET, EXTENDED RELEASE ORAL DAILY
Qty: 90 TABLET | Refills: 1 | Status: SHIPPED | OUTPATIENT
Start: 2025-04-01 | End: 2025-09-28

## 2025-04-15 DIAGNOSIS — E03.9 HYPOTHYROIDISM, UNSPECIFIED TYPE: ICD-10-CM

## 2025-04-18 RX ORDER — LEVOTHYROXINE SODIUM 88 UG/1
88 TABLET ORAL
Qty: 100 TABLET | Refills: 1 | Status: SHIPPED | OUTPATIENT
Start: 2025-04-18

## 2025-05-05 NOTE — PROGRESS NOTES
Subjective      No chief complaint on file.       No surgery found     HPI  This 78 year old patient presents today with  bilateral knee pain. The patient states that this bilateral knee pain has been present for years. The patient rates the knee pain as 8. The patient states that knee pain is worse with and aggravated by activity and bearing weight. The patient states the knee is giving way and locking.The patient has tried ibuprofen and tylenol with no relief. Patient requests a discussion of further treatment options on examination today.     CARDIOLOGY:   Negative for chest pain, shortness of breath.   RESPIRATORY:   Negative for chest pain, shortness of breath.   MUSCULOSKELETAL:   See HPI for details.   NEUROLOGY:   Negative for tingling, numbness, weakness.    Objective    There were no vitals filed for this visit.    Knee Exam  Constitutional: Appears stated age. No apparent distress  Labored Breathing: No  Psychiatric: Normal mood and effect.   Neurological: alert and oriented x3  Skin: intact  MUSCULOSKELETAL: Neck: No tenderness. No pain or limitation with range of motion. Back: No tenderness. Straight leg test negative bilaterally. bilateral knee: There is diffuse tenderness over the knee. diminished range of motion McMurrays is negative. Anterior drawer and lachmans are negative. There is not an effusion present. The knee is stable to valgus and varus stressing. The patient walks with a painful gait favoring the bilateral knee while walking.    Standing AP x-rays of both knees and lateral x-rays of the left and right knees done and read in office today show osteoarthritis of the right and left knees.     Patient ID: Marybeth Duggan is a 79 y.o. female.    L Inj/Asp: bilateral knee on 5/6/2025 1:00 PM  Indications: pain  Details: 22 G needle, medial approach  Medications (Right): 40 mg triamcinolone acetonide 40 mg/mL; 1 mL lidocaine 10 mg/mL (1 %)  Medications (Left): 40 mg triamcinolone acetonide 40  mg/mL; 1 mL lidocaine 10 mg/mL (1 %)  Outcome: tolerated well, no immediate complications  Procedure, treatment alternatives, risks and benefits explained, specific risks discussed. Immediately prior to procedure a time out was called to verify the correct patient, procedure, equipment, support staff and site/side marked as required. Patient was prepped and draped in the usual sterile fashion.         Diagnoses and all orders for this visit:  Bilateral chronic knee pain (Primary)  -     XR knee 1-2 views bilateral; Future  Other orders  -     L Inj/Asp  Options are discussed with the patient in detail. The patient is instructed regarding activity modification, ice, physician directed at home gentle strengthening and ROM exercises, and the appropriate use of Tylenol as needed for pain with its potential adverse reactions and side effects. The patient understands. The patient states that despite all the treatment listed above that this left and right knee pain is debilitating and  requests a discussion of further options. Cortisone injection to the left and right knee is discussed in the office today. This is done in the office today. See procedures below. Return as needed, Please note that this report has been produced using speech recognition software.  It may contain errors related to grammar, punctuation or spelling.  Electronically signed, but not reviewed.       Angie Rosa PA-C

## 2025-05-06 ENCOUNTER — OFFICE VISIT (OUTPATIENT)
Dept: ORTHOPEDIC SURGERY | Facility: CLINIC | Age: 80
End: 2025-05-06
Payer: MEDICARE

## 2025-05-06 ENCOUNTER — HOSPITAL ENCOUNTER (OUTPATIENT)
Dept: RADIOLOGY | Facility: CLINIC | Age: 80
Discharge: HOME | End: 2025-05-06
Payer: MEDICARE

## 2025-05-06 DIAGNOSIS — M25.561 BILATERAL CHRONIC KNEE PAIN: ICD-10-CM

## 2025-05-06 DIAGNOSIS — M25.562 BILATERAL CHRONIC KNEE PAIN: ICD-10-CM

## 2025-05-06 DIAGNOSIS — G89.29 BILATERAL CHRONIC KNEE PAIN: ICD-10-CM

## 2025-05-06 DIAGNOSIS — G89.29 BILATERAL CHRONIC KNEE PAIN: Primary | ICD-10-CM

## 2025-05-06 DIAGNOSIS — M25.561 BILATERAL CHRONIC KNEE PAIN: Primary | ICD-10-CM

## 2025-05-06 DIAGNOSIS — M25.562 BILATERAL CHRONIC KNEE PAIN: Primary | ICD-10-CM

## 2025-05-06 DIAGNOSIS — M17.0 BILATERAL PRIMARY OSTEOARTHRITIS OF KNEE: ICD-10-CM

## 2025-05-06 PROCEDURE — 73560 X-RAY EXAM OF KNEE 1 OR 2: CPT | Mod: 50

## 2025-05-06 PROCEDURE — 99213 OFFICE O/P EST LOW 20 MIN: CPT | Performed by: PHYSICIAN ASSISTANT

## 2025-05-06 PROCEDURE — 99213 OFFICE O/P EST LOW 20 MIN: CPT | Mod: 25 | Performed by: PHYSICIAN ASSISTANT

## 2025-05-06 PROCEDURE — 2500000004 HC RX 250 GENERAL PHARMACY W/ HCPCS (ALT 636 FOR OP/ED): Performed by: PHYSICIAN ASSISTANT

## 2025-05-06 RX ORDER — LIDOCAINE HYDROCHLORIDE 10 MG/ML
1 INJECTION, SOLUTION INFILTRATION; PERINEURAL
Status: COMPLETED | OUTPATIENT
Start: 2025-05-06 | End: 2025-05-06

## 2025-05-06 RX ORDER — TRIAMCINOLONE ACETONIDE 40 MG/ML
40 INJECTION, SUSPENSION INTRA-ARTICULAR; INTRAMUSCULAR
Status: COMPLETED | OUTPATIENT
Start: 2025-05-06 | End: 2025-05-06

## 2025-05-06 RX ADMIN — LIDOCAINE HYDROCHLORIDE 1 ML: 10 INJECTION, SOLUTION INFILTRATION; PERINEURAL at 13:00

## 2025-05-06 RX ADMIN — TRIAMCINOLONE ACETONIDE 40 MG: 40 INJECTION, SUSPENSION INTRA-ARTICULAR; INTRAMUSCULAR at 13:00

## 2025-05-27 ENCOUNTER — OFFICE VISIT (OUTPATIENT)
Dept: PRIMARY CARE | Facility: CLINIC | Age: 80
End: 2025-05-27
Payer: MEDICARE

## 2025-05-27 VITALS
HEART RATE: 62 BPM | OXYGEN SATURATION: 94 % | SYSTOLIC BLOOD PRESSURE: 152 MMHG | WEIGHT: 205 LBS | DIASTOLIC BLOOD PRESSURE: 74 MMHG | TEMPERATURE: 96.8 F | BODY MASS INDEX: 35.74 KG/M2

## 2025-05-27 DIAGNOSIS — I73.9 CLAUDICATION: ICD-10-CM

## 2025-05-27 DIAGNOSIS — N39.3 STRESS INCONTINENCE: ICD-10-CM

## 2025-05-27 DIAGNOSIS — I50.9 HEART FAILURE, UNSPECIFIED HF CHRONICITY, UNSPECIFIED HEART FAILURE TYPE: ICD-10-CM

## 2025-05-27 DIAGNOSIS — M79.605 LEFT LEG PAIN: ICD-10-CM

## 2025-05-27 DIAGNOSIS — E11.51 TYPE 2 DIABETES MELLITUS WITH DIABETIC PERIPHERAL ANGIOPATHY WITHOUT GANGRENE, WITHOUT LONG-TERM CURRENT USE OF INSULIN (MULTI): ICD-10-CM

## 2025-05-27 DIAGNOSIS — E11.9 DIABETES MELLITUS WITHOUT COMPLICATION: ICD-10-CM

## 2025-05-27 DIAGNOSIS — R56.1 POST TRAUMATIC SEIZURES (MULTI): ICD-10-CM

## 2025-05-27 DIAGNOSIS — Z87.828 H/O TRAUMATIC SUBDURAL HEMATOMA: ICD-10-CM

## 2025-05-27 DIAGNOSIS — E66.01 MORBID (SEVERE) OBESITY DUE TO EXCESS CALORIES (MULTI): ICD-10-CM

## 2025-05-27 DIAGNOSIS — H81.10 BENIGN PAROXYSMAL POSITIONAL VERTIGO, UNSPECIFIED LATERALITY: Primary | ICD-10-CM

## 2025-05-27 PROCEDURE — 1159F MED LIST DOCD IN RCRD: CPT | Performed by: INTERNAL MEDICINE

## 2025-05-27 PROCEDURE — 1126F AMNT PAIN NOTED NONE PRSNT: CPT | Performed by: INTERNAL MEDICINE

## 2025-05-27 PROCEDURE — 3078F DIAST BP <80 MM HG: CPT | Performed by: INTERNAL MEDICINE

## 2025-05-27 PROCEDURE — 99214 OFFICE O/P EST MOD 30 MIN: CPT | Performed by: INTERNAL MEDICINE

## 2025-05-27 PROCEDURE — 3077F SYST BP >= 140 MM HG: CPT | Performed by: INTERNAL MEDICINE

## 2025-05-27 PROCEDURE — G2211 COMPLEX E/M VISIT ADD ON: HCPCS | Performed by: INTERNAL MEDICINE

## 2025-05-27 RX ORDER — MECLIZINE HYDROCHLORIDE 25 MG/1
25 TABLET ORAL 3 TIMES DAILY PRN
Qty: 90 TABLET | Refills: 2 | Status: SHIPPED | OUTPATIENT
Start: 2025-05-27 | End: 2026-05-27

## 2025-05-27 ASSESSMENT — ENCOUNTER SYMPTOMS
OCCASIONAL FEELINGS OF UNSTEADINESS: 1
DEPRESSION: 0
LOSS OF SENSATION IN FEET: 0

## 2025-05-27 ASSESSMENT — PAIN SCALES - GENERAL: PAINLEVEL_OUTOF10: 0-NO PAIN

## 2025-05-27 NOTE — PROGRESS NOTES
Subjective   Patient ID: Marybeth Duggan is a 79 y.o. female who presents for Dizziness and Fatigue (Left leg has warm and cold sensations).    HPI     She has history of hypertension, hyperlipidemia, hypothyroidism, anxiety, prediabetes, coronary artery disease, GERD, heart failure with preserved ejection fraction?? diagnosed in August 2024, severe osteoarthritis with right hip replacement in 2011, traumatic subdural hematoma in April 2024      Dizziness since last 2 weeks, has associated ringing in ears. No recent URI.  Feels like room is spinning. Stopped trazodone 4 days ago, due to possible side effect of dizziness, she did not notice any difference since she stopped trazodone  Feels nauseous when she has dizziness      Incontinence getting worse since last few months  She tried Oxybutynin and Gemtesa .  Also received Botox injections, advised follow-up with urology if no improvement in symptoms    Left lower extremity feeling hot and cold, as per daughter it looks pale sometimes  Has associated numbness below knee occasionally    Review of Systems   Constitutional:  Negative for chills, fatigue and unexpected weight change.   HENT:  Negative for postnasal drip, sinus pressure and trouble swallowing.    Respiratory:  Negative for cough, shortness of breath and wheezing.    Cardiovascular:  Negative for chest pain, palpitations and leg swelling.   Gastrointestinal:  Negative for abdominal pain, blood in stool, nausea and vomiting.   Genitourinary:  Negative for dysuria and frequency.   Musculoskeletal:  Positive for arthralgias. Negative for back pain and myalgias.   Skin:  Negative for rash.   Neurological:  Positive for dizziness and numbness. Negative for tremors and seizures.   Psychiatric/Behavioral:  Negative for behavioral problems.        Objective   /74 (BP Location: Left arm, Patient Position: Sitting, BP Cuff Size: Adult)   Pulse 62   Temp 36 °C (96.8 °F) (Temporal)   Wt 93 kg (205 lb)   SpO2  94%   BMI 35.74 kg/m²     Physical Exam  Constitutional:       General: She is not in acute distress.     Appearance: Normal appearance.   HENT:      Right Ear: Tympanic membrane normal.      Left Ear: Tympanic membrane normal.   Eyes:      Extraocular Movements: Extraocular movements intact.   Cardiovascular:      Rate and Rhythm: Normal rate and regular rhythm.      Heart sounds: Normal heart sounds. No murmur heard.     No friction rub.   Pulmonary:      Effort: Pulmonary effort is normal.      Breath sounds: Normal breath sounds. No wheezing or rales.   Abdominal:      General: Bowel sounds are normal.      Palpations: Abdomen is soft.      Tenderness: There is no abdominal tenderness. There is no guarding.   Musculoskeletal:      Right lower leg: No edema.      Left lower leg: No edema.   Skin:     Comments: Unable to palpate left lower extremity dorsalis pedis   Neurological:      General: No focal deficit present.      Mental Status: She is alert and oriented to person, place, and time.      Cranial Nerves: No cranial nerve deficit.      Comments: Ambulates with cane/walker   Psychiatric:         Mood and Affect: Mood normal.         Assessment/Plan        Marybeth was seen today for dizziness and fatigue.  Diagnoses and all orders for this visit:  Benign paroxysmal positional vertigo, unspecified laterality (Primary)  -     meclizine (Antivert) 25 mg tablet; Take 1 tablet (25 mg) by mouth 3 times a day as needed for dizziness.  -     Referral to ENT; Future  Stress incontinence  -     vibegron 75 mg tablet; Take 1 tablet (75 mg) by mouth once daily.  Left leg pain  Claudication  -     Vascular US ankle brachial index (REED) without exercise; Future  H/O traumatic subdural hematoma  Heart failure, unspecified HF chronicity, unspecified heart failure type  Post traumatic seizures (Multi)  Diabetes mellitus without complication  Type 2 diabetes mellitus with diabetic peripheral angiopathy without gangrene,  without long-term current use of insulin (Multi)  -     Albumin-Creatinine Ratio, Urine Random; Future  -     Albumin-Creatinine Ratio, Urine Random  Morbid (severe) obesity due to excess calories (Multi)    Advised physical therapy for vertigo, which patient refused  Advised to do exercises at home, demonstrated some exercises to do at home    Lab Results   Component Value Date    WBC 7.7 01/02/2025    HGB 13.3 01/02/2025    HCT 40.4 01/02/2025     01/02/2025     01/02/2025     Lab Results   Component Value Date    GLUCOSE 124 (H) 01/02/2025    CALCIUM 9.1 01/02/2025     01/02/2025    K 4.5 01/02/2025    CO2 28 01/02/2025     01/02/2025    BUN 20 01/02/2025    CREATININE 0.84 01/02/2025     Current Outpatient Medications   Medication Instructions    albuterol (Proventil HFA) 90 mcg/actuation inhaler 2 puffs, inhalation, Every 6 hours PRN    aspirin 81 mg, Daily    atorvastatin (LIPITOR) 80 mg, oral, Nightly    cholecalciferol (VITAMIN D3) 50 mcg, Daily    levothyroxine (SYNTHROID, LEVOXYL) 88 mcg, oral, Daily before breakfast    losartan-hydrochlorothiazide (Hyzaar) 100-25 mg tablet 1 tablet, oral, Daily    meclizine (ANTIVERT) 25 mg, oral, 3 times daily PRN    metoprolol succinate XL (TOPROL-XL) 100 mg, oral, Daily, Do not crush or chew.    PARoxetine (PAXIL) 20 mg, oral, Daily    traZODone (DESYREL) 50 mg, oral, Nightly PRN    vibegron 75 mg, oral, Daily

## 2025-05-29 PROBLEM — I50.9 HEART FAILURE, UNSPECIFIED HF CHRONICITY, UNSPECIFIED HEART FAILURE TYPE: Status: ACTIVE | Noted: 2025-05-29

## 2025-05-29 PROBLEM — R56.1 POST TRAUMATIC SEIZURES (MULTI): Status: ACTIVE | Noted: 2025-05-29

## 2025-05-29 PROBLEM — E11.9 DIABETES MELLITUS WITHOUT COMPLICATION: Status: ACTIVE | Noted: 2025-05-29

## 2025-05-29 PROBLEM — E11.51 TYPE 2 DIABETES MELLITUS WITH DIABETIC PERIPHERAL ANGIOPATHY WITHOUT GANGRENE, WITHOUT LONG-TERM CURRENT USE OF INSULIN (MULTI): Status: ACTIVE | Noted: 2025-05-29

## 2025-05-29 ASSESSMENT — ENCOUNTER SYMPTOMS
PALPITATIONS: 0
FREQUENCY: 0
WHEEZING: 0
VOMITING: 0
NAUSEA: 0
SEIZURES: 0
DYSURIA: 0
MYALGIAS: 0
SHORTNESS OF BREATH: 0
BACK PAIN: 0
TROUBLE SWALLOWING: 0
COUGH: 0
FATIGUE: 0
NUMBNESS: 1
DIZZINESS: 1
BLOOD IN STOOL: 0
UNEXPECTED WEIGHT CHANGE: 0
ARTHRALGIAS: 1
TREMORS: 0
ABDOMINAL PAIN: 0
SINUS PRESSURE: 0
CHILLS: 0

## 2025-06-03 ENCOUNTER — CLINICAL SUPPORT (OUTPATIENT)
Dept: AUDIOLOGY | Facility: CLINIC | Age: 80
End: 2025-06-03
Payer: MEDICARE

## 2025-06-03 ENCOUNTER — APPOINTMENT (OUTPATIENT)
Dept: OTOLARYNGOLOGY | Facility: CLINIC | Age: 80
End: 2025-06-03
Payer: MEDICARE

## 2025-06-03 VITALS — HEIGHT: 64 IN | WEIGHT: 205 LBS | BODY MASS INDEX: 35 KG/M2

## 2025-06-03 DIAGNOSIS — H90.3 SENSORINEURAL HEARING LOSS (SNHL) OF BOTH EARS: Primary | ICD-10-CM

## 2025-06-03 DIAGNOSIS — H90.3 SENSORINEURAL HEARING LOSS (SNHL) OF BOTH EARS: ICD-10-CM

## 2025-06-03 DIAGNOSIS — H81.10 BENIGN PAROXYSMAL POSITIONAL VERTIGO, UNSPECIFIED LATERALITY: ICD-10-CM

## 2025-06-03 DIAGNOSIS — M43.6 NECK STIFFNESS: ICD-10-CM

## 2025-06-03 DIAGNOSIS — M54.2 NECK PAIN: ICD-10-CM

## 2025-06-03 DIAGNOSIS — R42 VERTIGO: Primary | ICD-10-CM

## 2025-06-03 PROCEDURE — 1036F TOBACCO NON-USER: CPT | Performed by: NURSE PRACTITIONER

## 2025-06-03 PROCEDURE — 99203 OFFICE O/P NEW LOW 30 MIN: CPT | Performed by: NURSE PRACTITIONER

## 2025-06-03 PROCEDURE — 92557 COMPREHENSIVE HEARING TEST: CPT | Performed by: AUDIOLOGIST

## 2025-06-03 PROCEDURE — 1159F MED LIST DOCD IN RCRD: CPT | Performed by: NURSE PRACTITIONER

## 2025-06-03 PROCEDURE — 1160F RVW MEDS BY RX/DR IN RCRD: CPT | Performed by: NURSE PRACTITIONER

## 2025-06-03 PROCEDURE — 92567 TYMPANOMETRY: CPT | Performed by: AUDIOLOGIST

## 2025-06-03 ASSESSMENT — PATIENT HEALTH QUESTIONNAIRE - PHQ9
SUM OF ALL RESPONSES TO PHQ9 QUESTIONS 1 AND 2: 0
1. LITTLE INTEREST OR PLEASURE IN DOING THINGS: NOT AT ALL
2. FEELING DOWN, DEPRESSED OR HOPELESS: NOT AT ALL

## 2025-06-03 NOTE — PROGRESS NOTES
Chief Complaint   Patient presents with    Hearing Loss    Dizziness       HISTORY:  Marybeth Duggan, age 79 years, was seen for audiogram in conjunction with otolaryngology appointment on 6/3/2025.  Ms. Duggan is accompanied to the appointment by her daughter who assists in communication as Ms. Duggan as English is her second language.  Ms. Duggan was last tested October 2020. She pursued hearing aids through insurance approved private practice.  She is reported to have worn the hearing aids consistently in the beginning but has not worn them in some time.  Ms. Crockett reports onset of dizziness beginning three to four weeks ago.  She notes the dizziness with positional/head movements.  There is no history of middle ear pathology or ear surgery.  Please see medical records for complete history.    RESULTS:  Prior to testing both external auditory canals were clear and tympanic membranes visualized    Immittance and acoustic reflexes:  Immittance testing yielded TYPE A tympanograms indicating normal middle ear function both ears  Acoustic reflexes were absent 500 - 4000 Hz both ears    Audiogram:  Mild to moderate severe sensorineural hearing loss 125 - 8000 Hz both ears  Speech reception thresholds obtained at 45 dBHL right ear and 50 dBHL left ear  Speech discrimination scores were 88% at 80 dBHL both ears    IMPRESSIONS:  Normal middle ear function noted both ears  Absent acoustic reflexes noted both ears  Mild to moderate severe sensorineural hearing loss both ears    RECOMMENDATIONS:  1.  Follow up with otolaryngology  2.  Retest hearing levels annually  3.  Return to hearing aid practice for hearing aid check and adjustments    time: 3136 - [id number]

## 2025-06-03 NOTE — PROGRESS NOTES
Subjective   Patient ID: Marybeth Duggan is a 79 y.o. female who presents for Vertigo.  HPI  [facility]  In review, she initially saw me in 2020 for bilateral sensorineural hearing loss and tinnitus.  Today, she presents with complaints of vertigo.  She is primarily Irish speaking and is accompanied by her daughter who is able to translate.  They report positionally triggered spinning that is short lasting over the past 1 month.  Last year, she suffered a fall resulting in a subdural hematoma.  Today, she also endorses chronic neck pain and stiffness.  The patient did get hearing aids after her last visit but does not wear them often as she lives alone.  She denies any history of otologic surgery.  Review of Systems  A comprehensive or 10 points review of the patient's constitutional, neurological, HEENT, pulmonary, cardiovascular and genito-urinary systems showed only those mentioned in history of present illness.    Objective   Physical Exam  Constitutional: no fever, chills, weight loss or weight gain   General appearance: Appears well, well-nourished, well groomed. No acute distress.   Communication: Normal communication   Psychiatric: Oriented to person, place and time. Normal mood and affect.   Neurologic: Cranial nerves II-XII grossly intact and symmetric bilaterally.   Head and Face:   Head: Atraumatic with no masses, lesions or scarring.   Face: Normal symmetry, no paralysis, synkinesis or facial tic. No scars or deformities.     Eyes: Conjunctiva not edematous or erythematous   Ears: External inspection of ears with no deformity, scars or masses. Bilateral EACs clear and bilateral TMs intact with no signs of effusions   Nose: External inspection of nose: No nasal lesions, lacerations or scars.   Neck: Normal appearing, symmetric, trachea midline.   Cardiovascular: Examination of peripheral vascular system shows no clubbing or cyanosis.   Respiratory: No respiratory  distress increased work of breathing. Inspection of the chest with symmetric chest expansion and normal respiratory effort.   Skin: No rashes in the head or neck    Bedside occulomotor function assessment for ocular pursuits and saccades, spontaneous nystagmus was normal.  Bilateral head thrust with decreased range of motion bilaterally but otherwise normal  Anisa-Hallpike negative bilaterally  My interpretation of the audiogram done today is mild sloping to moderate/severe sensorineural hearing loss bilaterally.  Word recognition scores 88% bilaterally.  Normal tympanograms bilaterally.  Overall, there has been a 5 to 10 dB progression of her hearing loss in all frequencies when compared to her audiogram from 2020.    Assessment/Plan        This patient presents for initial evaluation of acute acquired vertigo as well as chronic bilateral sensorineural hearing loss, neck pain, neck stiffness.    Reassurance given that otologic exam today is normal.  Audiogram was reviewed in detail.  We discussed that the progression of her hearing loss is not significant since her last test was 5 years ago.  I recommended that they take their copy of her audiogram to her hearing aid provider to have her hearing aids adjusted and I encouraged her to wear them as often as possible.  We discussed that her description of symptoms is consistent with BPPV versus cervicogenic in origin.  BPPV could have been caused by her head injury.  Initially, I recommended vestibular physical therapy.  Her daughter reports that it will be difficult to transport her there regularly.  They inquired about home exercises.  She was given a copy of instructions for home balance exercises for BPPV.  If symptoms have not improved in 2 weeks, I would then encourage physical therapy.  Patient and family are in agreement with the plan.  All questions were answered to patient and family's satisfaction.    30 minutes was spent on this patient's visit. More than 50%  of that time was spent in counseling regarding the possible etiologies, test results, treatment options and coordinating care.      This note was created using speech recognition transcription software. Despite proofreading, several typographical errors might be present that might affect the meaning of the content. Please call with any questions.      DEANDRE Brown-CNP 06/03/25 11:13 AM

## 2025-06-05 ENCOUNTER — APPOINTMENT (OUTPATIENT)
Dept: VASCULAR MEDICINE | Facility: CLINIC | Age: 80
End: 2025-06-05
Payer: MEDICARE

## 2025-06-16 ENCOUNTER — APPOINTMENT (OUTPATIENT)
Dept: VASCULAR MEDICINE | Facility: CLINIC | Age: 80
End: 2025-06-16
Payer: MEDICARE

## 2025-06-17 ENCOUNTER — ANCILLARY PROCEDURE (OUTPATIENT)
Dept: VASCULAR MEDICINE | Facility: CLINIC | Age: 80
End: 2025-06-17
Payer: MEDICARE

## 2025-06-17 DIAGNOSIS — M79.605 PAIN IN LEFT LEG: ICD-10-CM

## 2025-06-17 DIAGNOSIS — I73.9 CLAUDICATION: ICD-10-CM

## 2025-06-17 PROCEDURE — 93922 UPR/L XTREMITY ART 2 LEVELS: CPT | Performed by: INTERNAL MEDICINE

## 2025-06-17 PROCEDURE — 93922 UPR/L XTREMITY ART 2 LEVELS: CPT

## 2025-07-05 DIAGNOSIS — E55.9 VITAMIN D DEFICIENCY: ICD-10-CM

## 2025-07-05 DIAGNOSIS — E03.9 ACQUIRED HYPOTHYROIDISM: ICD-10-CM

## 2025-07-05 DIAGNOSIS — R73.9 HYPERGLYCEMIA: ICD-10-CM

## 2025-07-05 DIAGNOSIS — I10 ESSENTIAL HYPERTENSION: ICD-10-CM

## 2025-07-05 DIAGNOSIS — E78.2 MIXED HYPERLIPIDEMIA: ICD-10-CM

## 2025-07-22 ENCOUNTER — APPOINTMENT (OUTPATIENT)
Dept: CARDIOLOGY | Facility: CLINIC | Age: 80
End: 2025-07-22
Payer: MEDICARE

## 2025-08-05 DIAGNOSIS — F32.A ANXIETY AND DEPRESSION: ICD-10-CM

## 2025-08-05 DIAGNOSIS — F41.9 ANXIETY AND DEPRESSION: ICD-10-CM

## 2025-08-06 RX ORDER — PAROXETINE 20 MG/1
20 TABLET, FILM COATED ORAL DAILY
Qty: 90 TABLET | Refills: 0 | Status: SHIPPED | OUTPATIENT
Start: 2025-08-06

## 2025-08-08 LAB
25(OH)D3+25(OH)D2 SERPL-MCNC: 62 NG/ML (ref 30–100)
ALBUMIN SERPL-MCNC: 3.9 G/DL (ref 3.6–5.1)
ALP SERPL-CCNC: 106 U/L (ref 37–153)
ALT SERPL-CCNC: 13 U/L (ref 6–29)
ANION GAP SERPL CALCULATED.4IONS-SCNC: 10 MMOL/L (CALC) (ref 7–17)
AST SERPL-CCNC: 16 U/L (ref 10–35)
BASOPHILS # BLD AUTO: 43 CELLS/UL (ref 0–200)
BASOPHILS NFR BLD AUTO: 0.5 %
BILIRUB SERPL-MCNC: 0.9 MG/DL (ref 0.2–1.2)
BUN SERPL-MCNC: 19 MG/DL (ref 7–25)
CALCIUM SERPL-MCNC: 9.2 MG/DL (ref 8.6–10.4)
CHLORIDE SERPL-SCNC: 102 MMOL/L (ref 98–110)
CHOLEST SERPL-MCNC: 147 MG/DL
CHOLEST/HDLC SERPL: 3.2 (CALC)
CO2 SERPL-SCNC: 28 MMOL/L (ref 20–32)
CREAT SERPL-MCNC: 0.72 MG/DL (ref 0.6–1)
EGFRCR SERPLBLD CKD-EPI 2021: 85 ML/MIN/1.73M2
EOSINOPHIL # BLD AUTO: 232 CELLS/UL (ref 15–500)
EOSINOPHIL NFR BLD AUTO: 2.7 %
ERYTHROCYTE [DISTWIDTH] IN BLOOD BY AUTOMATED COUNT: 12.3 % (ref 11–15)
EST. AVERAGE GLUCOSE BLD GHB EST-MCNC: 146 MG/DL
EST. AVERAGE GLUCOSE BLD GHB EST-SCNC: 8.1 MMOL/L
GLUCOSE SERPL-MCNC: 101 MG/DL (ref 65–99)
HBA1C MFR BLD: 6.7 %
HCT VFR BLD AUTO: 40.2 % (ref 35–45)
HDLC SERPL-MCNC: 46 MG/DL
HGB BLD-MCNC: 13.4 G/DL (ref 11.7–15.5)
LDLC SERPL CALC-MCNC: 75 MG/DL (CALC)
LYMPHOCYTES # BLD AUTO: 2219 CELLS/UL (ref 850–3900)
LYMPHOCYTES NFR BLD AUTO: 25.8 %
MCH RBC QN AUTO: 32.6 PG (ref 27–33)
MCHC RBC AUTO-ENTMCNC: 33.3 G/DL (ref 32–36)
MCV RBC AUTO: 97.8 FL (ref 80–100)
MONOCYTES # BLD AUTO: 697 CELLS/UL (ref 200–950)
MONOCYTES NFR BLD AUTO: 8.1 %
NEUTROPHILS # BLD AUTO: 5409 CELLS/UL (ref 1500–7800)
NEUTROPHILS NFR BLD AUTO: 62.9 %
NONHDLC SERPL-MCNC: 101 MG/DL (CALC)
PLATELET # BLD AUTO: 328 THOUSAND/UL (ref 140–400)
PMV BLD REES-ECKER: 10.4 FL (ref 7.5–12.5)
POTASSIUM SERPL-SCNC: 4.6 MMOL/L (ref 3.5–5.3)
PROT SERPL-MCNC: 7.1 G/DL (ref 6.1–8.1)
RBC # BLD AUTO: 4.11 MILLION/UL (ref 3.8–5.1)
SODIUM SERPL-SCNC: 140 MMOL/L (ref 135–146)
TRIGL SERPL-MCNC: 166 MG/DL
TSH SERPL-ACNC: 1.26 MIU/L (ref 0.4–4.5)
WBC # BLD AUTO: 8.6 THOUSAND/UL (ref 3.8–10.8)

## 2025-08-25 ENCOUNTER — OFFICE VISIT (OUTPATIENT)
Dept: PRIMARY CARE | Facility: CLINIC | Age: 80
End: 2025-08-25
Payer: MEDICARE

## 2025-08-25 VITALS
WEIGHT: 213 LBS | DIASTOLIC BLOOD PRESSURE: 76 MMHG | HEART RATE: 65 BPM | OXYGEN SATURATION: 96 % | BODY MASS INDEX: 37.14 KG/M2 | SYSTOLIC BLOOD PRESSURE: 136 MMHG | TEMPERATURE: 97.2 F

## 2025-08-25 DIAGNOSIS — F32.A ANXIETY AND DEPRESSION: ICD-10-CM

## 2025-08-25 DIAGNOSIS — F41.9 ANXIETY AND DEPRESSION: ICD-10-CM

## 2025-08-25 DIAGNOSIS — I10 ESSENTIAL HYPERTENSION: ICD-10-CM

## 2025-08-25 DIAGNOSIS — E11.51 TYPE 2 DIABETES MELLITUS WITH DIABETIC PERIPHERAL ANGIOPATHY WITHOUT GANGRENE, WITHOUT LONG-TERM CURRENT USE OF INSULIN (MULTI): ICD-10-CM

## 2025-08-25 DIAGNOSIS — E03.9 HYPOTHYROIDISM, UNSPECIFIED TYPE: ICD-10-CM

## 2025-08-25 PROCEDURE — 1126F AMNT PAIN NOTED NONE PRSNT: CPT | Performed by: INTERNAL MEDICINE

## 2025-08-25 PROCEDURE — 3078F DIAST BP <80 MM HG: CPT | Performed by: INTERNAL MEDICINE

## 2025-08-25 PROCEDURE — 1036F TOBACCO NON-USER: CPT | Performed by: INTERNAL MEDICINE

## 2025-08-25 PROCEDURE — 99397 PER PM REEVAL EST PAT 65+ YR: CPT | Mod: 25 | Performed by: INTERNAL MEDICINE

## 2025-08-25 PROCEDURE — 99215 OFFICE O/P EST HI 40 MIN: CPT | Performed by: INTERNAL MEDICINE

## 2025-08-25 PROCEDURE — 3075F SYST BP GE 130 - 139MM HG: CPT | Performed by: INTERNAL MEDICINE

## 2025-08-25 PROCEDURE — 1159F MED LIST DOCD IN RCRD: CPT | Performed by: INTERNAL MEDICINE

## 2025-08-25 PROCEDURE — 1170F FXNL STATUS ASSESSED: CPT | Performed by: INTERNAL MEDICINE

## 2025-08-25 PROCEDURE — G0439 PPPS, SUBSEQ VISIT: HCPCS | Performed by: INTERNAL MEDICINE

## 2025-08-25 PROCEDURE — 99397 PER PM REEVAL EST PAT 65+ YR: CPT | Performed by: INTERNAL MEDICINE

## 2025-08-25 RX ORDER — LOSARTAN POTASSIUM AND HYDROCHLOROTHIAZIDE 25; 100 MG/1; MG/1
1 TABLET ORAL DAILY
Qty: 90 TABLET | Refills: 1 | Status: SHIPPED | OUTPATIENT
Start: 2025-08-25 | End: 2026-02-21

## 2025-08-25 RX ORDER — METOPROLOL SUCCINATE 100 MG/1
100 TABLET, EXTENDED RELEASE ORAL DAILY
Qty: 90 TABLET | Refills: 1 | Status: SHIPPED | OUTPATIENT
Start: 2025-08-25 | End: 2026-02-21

## 2025-08-25 RX ORDER — LEVOTHYROXINE SODIUM 88 UG/1
88 TABLET ORAL
Qty: 100 TABLET | Refills: 1 | Status: SHIPPED | OUTPATIENT
Start: 2025-08-25

## 2025-08-25 RX ORDER — PAROXETINE 20 MG/1
20 TABLET, FILM COATED ORAL DAILY
Qty: 90 TABLET | Refills: 1 | Status: SHIPPED | OUTPATIENT
Start: 2025-08-25

## 2025-08-25 ASSESSMENT — ENCOUNTER SYMPTOMS
ARTHRALGIAS: 1
ABDOMINAL PAIN: 0
NUMBNESS: 1
SEIZURES: 0
DEPRESSION: 0
VOMITING: 0
TROUBLE SWALLOWING: 0
SINUS PRESSURE: 0
CHILLS: 0
UNEXPECTED WEIGHT CHANGE: 0
TREMORS: 0
WHEEZING: 0
BLOOD IN STOOL: 0
MYALGIAS: 0
FATIGUE: 0
SHORTNESS OF BREATH: 0
DYSURIA: 0
OCCASIONAL FEELINGS OF UNSTEADINESS: 1
DIZZINESS: 1
BACK PAIN: 0
PALPITATIONS: 0
LOSS OF SENSATION IN FEET: 0
FREQUENCY: 0
NAUSEA: 0
COUGH: 0

## 2025-08-25 ASSESSMENT — PAIN SCALES - GENERAL: PAINLEVEL_OUTOF10: 0-NO PAIN

## 2025-08-25 ASSESSMENT — ACTIVITIES OF DAILY LIVING (ADL)
DRESSING: INDEPENDENT
BATHING: INDEPENDENT

## 2025-08-26 LAB
ALBUMIN/CREAT UR: 4 MG/G CREAT
CREAT UR-MCNC: 113 MG/DL (ref 20–275)
MICROALBUMIN UR-MCNC: 0.5 MG/DL